# Patient Record
Sex: FEMALE | Race: WHITE | Employment: OTHER | URBAN - METROPOLITAN AREA
[De-identification: names, ages, dates, MRNs, and addresses within clinical notes are randomized per-mention and may not be internally consistent; named-entity substitution may affect disease eponyms.]

---

## 2017-01-10 ENCOUNTER — APPOINTMENT (OUTPATIENT)
Dept: PHYSICAL THERAPY | Age: 77
End: 2017-01-10
Attending: PSYCHIATRY & NEUROLOGY

## 2017-01-13 ENCOUNTER — APPOINTMENT (OUTPATIENT)
Dept: PHYSICAL THERAPY | Age: 77
End: 2017-01-13
Attending: PSYCHIATRY & NEUROLOGY

## 2017-01-17 ENCOUNTER — APPOINTMENT (OUTPATIENT)
Dept: PHYSICAL THERAPY | Age: 77
End: 2017-01-17
Attending: PSYCHIATRY & NEUROLOGY

## 2017-01-20 ENCOUNTER — APPOINTMENT (OUTPATIENT)
Dept: PHYSICAL THERAPY | Age: 77
End: 2017-01-20
Attending: PSYCHIATRY & NEUROLOGY

## 2017-01-24 ENCOUNTER — APPOINTMENT (OUTPATIENT)
Dept: PHYSICAL THERAPY | Age: 77
End: 2017-01-24
Attending: PSYCHIATRY & NEUROLOGY
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
## 2017-01-25 PROBLEM — R07.9 CHEST PAIN: Status: ACTIVE | Noted: 2017-01-25

## 2017-01-25 PROBLEM — R26.89 IMBALANCE: Status: ACTIVE | Noted: 2017-01-25

## 2017-01-27 ENCOUNTER — APPOINTMENT (OUTPATIENT)
Dept: PHYSICAL THERAPY | Age: 77
End: 2017-01-27
Attending: PSYCHIATRY & NEUROLOGY

## 2017-01-31 ENCOUNTER — APPOINTMENT (OUTPATIENT)
Dept: PHYSICAL THERAPY | Age: 77
End: 2017-01-31
Attending: PSYCHIATRY & NEUROLOGY

## 2017-02-02 ENCOUNTER — APPOINTMENT (OUTPATIENT)
Dept: PHYSICAL THERAPY | Age: 77
End: 2017-02-02
Attending: FAMILY MEDICINE

## 2017-02-16 PROBLEM — R07.9 CHEST PAIN: Status: RESOLVED | Noted: 2017-01-25 | Resolved: 2017-02-16

## 2017-02-16 PROBLEM — R19.7 DIARRHEA: Status: ACTIVE | Noted: 2017-02-16

## 2017-07-12 PROBLEM — G30.1 LATE ONSET ALZHEIMER'S DISEASE WITHOUT BEHAVIORAL DISTURBANCE (HCC): Status: ACTIVE | Noted: 2017-07-12

## 2017-07-12 PROBLEM — F02.80 LATE ONSET ALZHEIMER'S DISEASE WITHOUT BEHAVIORAL DISTURBANCE (HCC): Status: ACTIVE | Noted: 2017-07-12

## 2017-07-17 PROBLEM — R11.15 NON-INTRACTABLE CYCLICAL VOMITING WITH NAUSEA: Status: ACTIVE | Noted: 2017-07-17

## 2017-09-12 PROBLEM — G30.1 LATE ONSET ALZHEIMER'S DISEASE WITH BEHAVIORAL DISTURBANCE (HCC): Status: ACTIVE | Noted: 2017-09-12

## 2017-09-12 PROBLEM — G30.1 LATE ONSET ALZHEIMER'S DISEASE WITHOUT BEHAVIORAL DISTURBANCE (HCC): Status: ACTIVE | Noted: 2017-07-12

## 2017-09-12 PROBLEM — F02.80 LATE ONSET ALZHEIMER'S DISEASE WITHOUT BEHAVIORAL DISTURBANCE (HCC): Status: ACTIVE | Noted: 2017-07-12

## 2017-09-12 PROBLEM — F02.818 LATE ONSET ALZHEIMER'S DISEASE WITH BEHAVIORAL DISTURBANCE (HCC): Status: ACTIVE | Noted: 2017-09-12

## 2018-02-08 PROBLEM — N30.00 ACUTE CYSTITIS WITHOUT HEMATURIA: Status: ACTIVE | Noted: 2018-02-08

## 2018-03-13 PROBLEM — E11.21 TYPE 2 DIABETES WITH NEPHROPATHY (HCC): Status: ACTIVE | Noted: 2018-03-13

## 2018-03-27 PROBLEM — S30.0XXA CONTUSION, BUTTOCK, INITIAL ENCOUNTER: Status: ACTIVE | Noted: 2018-03-27

## 2018-10-17 ENCOUNTER — HOME HEALTH ADMISSION (OUTPATIENT)
Dept: HOME HEALTH SERVICES | Facility: HOME HEALTH | Age: 78
End: 2018-10-17

## 2019-03-22 ENCOUNTER — HOSPITAL ENCOUNTER (EMERGENCY)
Age: 79
Discharge: HOME | End: 2019-03-22
Attending: EMERGENCY MEDICINE
Payer: MEDICARE

## 2019-03-22 VITALS
TEMPERATURE: 98.1 F | RESPIRATION RATE: 20 BRPM | SYSTOLIC BLOOD PRESSURE: 161 MMHG | HEART RATE: 82 BPM | BODY MASS INDEX: 24.27 KG/M2 | WEIGHT: 137 LBS | DIASTOLIC BLOOD PRESSURE: 60 MMHG | OXYGEN SATURATION: 97 % | HEIGHT: 63 IN

## 2019-03-22 DIAGNOSIS — R07.9 CHEST PAIN, UNSPECIFIED TYPE: Primary | ICD-10-CM

## 2019-03-22 LAB
ALBUMIN SERPL-MCNC: 4.1 G/DL (ref 3.2–4.6)
ALBUMIN/GLOB SERPL: 1.2 {RATIO}
ALP SERPL-CCNC: 97 U/L (ref 50–130)
ALT SERPL-CCNC: 16 U/L (ref 12–65)
ANION GAP SERPL CALC-SCNC: 8 MMOL/L
AST SERPL-CCNC: 13 U/L (ref 15–37)
ATRIAL RATE: 89 BPM
BASOPHILS # BLD: 0 K/UL (ref 0–0.2)
BASOPHILS NFR BLD: 1 % (ref 0–2)
BILIRUB SERPL-MCNC: 0.3 MG/DL (ref 0.2–1.1)
BUN SERPL-MCNC: 18 MG/DL (ref 8–23)
CALCIUM SERPL-MCNC: 9.3 MG/DL (ref 8.3–10.4)
CALCULATED P AXIS, ECG09: 75 DEGREES
CALCULATED R AXIS, ECG10: -32 DEGREES
CALCULATED T AXIS, ECG11: 71 DEGREES
CHLORIDE SERPL-SCNC: 104 MMOL/L (ref 98–107)
CO2 SERPL-SCNC: 27 MMOL/L (ref 21–32)
CREAT SERPL-MCNC: 1.08 MG/DL (ref 0.6–1)
DIAGNOSIS, 93000: NORMAL
DIFFERENTIAL METHOD BLD: ABNORMAL
EOSINOPHIL # BLD: 0.2 K/UL (ref 0–0.8)
EOSINOPHIL NFR BLD: 3 % (ref 0.5–7.8)
ERYTHROCYTE [DISTWIDTH] IN BLOOD BY AUTOMATED COUNT: 12.9 % (ref 11.9–14.6)
GLOBULIN SER CALC-MCNC: 3.3 G/DL (ref 2.3–3.5)
GLUCOSE SERPL-MCNC: 250 MG/DL (ref 65–100)
HCT VFR BLD AUTO: 39.2 % (ref 35.8–46.3)
HGB BLD-MCNC: 12.7 G/DL (ref 11.7–15.4)
IMM GRANULOCYTES # BLD AUTO: 0 K/UL (ref 0–0.5)
IMM GRANULOCYTES NFR BLD AUTO: 0 % (ref 0–5)
LYMPHOCYTES # BLD: 1.6 K/UL (ref 0.5–4.6)
LYMPHOCYTES NFR BLD: 25 % (ref 13–44)
MCH RBC QN AUTO: 29.9 PG (ref 26.1–32.9)
MCHC RBC AUTO-ENTMCNC: 32.4 G/DL (ref 31.4–35)
MCV RBC AUTO: 92.2 FL (ref 79.6–97.8)
MONOCYTES # BLD: 0.9 K/UL (ref 0.1–1.3)
MONOCYTES NFR BLD: 14 % (ref 4–12)
NEUTS SEG # BLD: 3.6 K/UL (ref 1.7–8.2)
NEUTS SEG NFR BLD: 58 % (ref 43–78)
NRBC # BLD: 0 K/UL (ref 0–0.2)
P-R INTERVAL, ECG05: 182 MS
PLATELET # BLD AUTO: 264 K/UL (ref 150–450)
PMV BLD AUTO: 10.6 FL (ref 9.4–12.3)
POTASSIUM SERPL-SCNC: 4.2 MMOL/L (ref 3.5–5.1)
PROT SERPL-MCNC: 7.4 G/DL
Q-T INTERVAL, ECG07: 382 MS
QRS DURATION, ECG06: 80 MS
QTC CALCULATION (BEZET), ECG08: 464 MS
RBC # BLD AUTO: 4.25 M/UL (ref 4.05–5.2)
SODIUM SERPL-SCNC: 139 MMOL/L (ref 136–145)
TROPONIN I BLD-MCNC: 0 NG/ML (ref 0.02–0.05)
TROPONIN I BLD-MCNC: 0 NG/ML (ref 0.02–0.05)
VENTRICULAR RATE, ECG03: 89 BPM
WBC # BLD AUTO: 6.3 K/UL (ref 4.3–11.1)

## 2019-03-22 PROCEDURE — 85025 COMPLETE CBC W/AUTO DIFF WBC: CPT

## 2019-03-22 PROCEDURE — 84484 ASSAY OF TROPONIN QUANT: CPT

## 2019-03-22 PROCEDURE — 80053 COMPREHEN METABOLIC PANEL: CPT

## 2019-03-22 PROCEDURE — 99285 EMERGENCY DEPT VISIT HI MDM: CPT | Performed by: EMERGENCY MEDICINE

## 2019-03-22 PROCEDURE — 93005 ELECTROCARDIOGRAM TRACING: CPT | Performed by: EMERGENCY MEDICINE

## 2019-03-22 NOTE — ED TRIAGE NOTES
Pt reports cp starting last evening. Described as pressure in middle of chest. Denies sob, nausea or vomiting. Cardiac hx from 2009

## 2019-03-23 NOTE — ED PROVIDER NOTES
History is obtained mainly from the patient's daughter as the patient has dementia. She was complaining of some chest pain off and on which started early this morning. The patient is unable to describe it in any great detail. She does have a cardiac history including bypass surgery approximately ten years ago with a stent placed approximately a year later. She sees  [facility] cardiology. The daughter states that the patient does not typically have chest pain. The patient does not remember having chest pain. The patient is unable to provide any more detail. Elements of this note were created using speech recognition software. As such, errors of speech recognition may be present. Past Medical History:  
Diagnosis Date  Anxiety state, unspecified  CAD (coronary artery disease)  Diabetes ([address] 8/27/2009  GERD (gastroesophageal reflux disease)  History of snoring 7/13/2016  History of stroke 10/19/2016  Hypertension  Hypothyroid 8/27/2009  Late onset Alzheimer's disease without behavioral disturbance 07/12/2012  Mild dementia 8/30/2016  Mixed hyperlipidemia  Osteopenia  Reactive depression 5/16/2016  Retinopathy due to secondary diabetes ([address] 8/27/2009  Sensory polyneuropathy 8/30/2016 Past Surgical History:  
Procedure Laterality Date  HX ANGIOPLASTY  2009 STENTING-post CABG  
 HX CHOLECYSTECTOMY  1993 LAPAROSCOPIC  
 HX CORONARY ARTERY BYPASS GRAFT  2009  
 triple [id number] thereafter  HX HEENT    
 right eyex  2-laser surgery?  HX KNEE ARTHROSCOPY Right  HX OTHER SURGICAL  2008  
 cataract extraction and iol implant  HX OTHER SURGICAL Carotid stent 3  
 HX THYROIDECTOMY  HX TONSILLECTOMY  as child  HX TOTAL VAGINAL HYSTERECTOMY    
 hysterectomy  HX UROLOGICAL    
 bladder tack Family History:  
Problem Relation Age of Onset  Heart Disease Mother CHF  Lung Disease Mother  COPD Mother  Heart Disease Father CAD  Cancer Father LUNG  
 Heart Disease Maternal Grandfather  Diabetes Maternal Grandfather  Heart Disease Paternal Grandfather  Breast Cancer Child 48  Hypertension Sister  Other Sister TACHYCARDIA  Diabetes Maternal Grandmother  Arthritis-osteo Sister  Malignant Hyperthermia Neg Hx  Pseudocholinesterase Deficiency Neg Hx  Delayed Awakening Neg Hx  Post-op Nausea/Vomiting Neg Hx  Emergence Delirium Neg Hx  Post-op Cognitive Dysfunction Neg Hx Social History Socioeconomic History  Marital status:  Spouse name: Not on file  Number of children: Not on file  Years of education: Not on file  Highest education level: Not on file Occupational History  Not on file Social Needs  Financial resource strain: Not on file  Food insecurity:  
  Worry: Not on file Inability: Not on file  Transportation needs:  
  Medical: Not on file Non-medical: Not on file Tobacco Use  Smoking status: Never Smoker  Smokeless tobacco: Never Used Substance and Sexual Activity  Alcohol use: No  
 Drug use: No  
 Sexual activity: Not Currently Partners: Male Lifestyle  Physical activity:  
  Days per week: Not on file Minutes per session: Not on file  Stress: Not on file Relationships  Social connections:  
  Talks on phone: Not on file Gets together: Not on file Attends [employer]ology service: Not on file Active member of club or organization: Not on file Attends meetings of clubs or organizations: Not on file Relationship status: Not on file  Intimate partner violence:  
  Fear of current or ex partner: Not on file Emotionally abused: Not on file Physically abused: Not on file Forced sexual activity: Not on file Other Topics Concern  Not on file Social History Narrative  Not on file ALLERGIES: Ace inhibitors; Aricept [donepezil]; Dextrose; Gatifloxacin; Levaquin [levofloxacin]; Macrodantin [nitrofurantoin macrocrystalline]; Morphine; Namenda [memantine]; Carmencita; and Sulfa (sulfonamide antibiotics) Review of Systems Unable to perform ROS: Dementia Cardiovascular: Positive for chest pain. Vitals:  
 03/22/19 1658 03/22/19 1859 BP: 192/83 140/63 Pulse: 97 92 Resp: 17 26 Temp: 98.1 °F (36.7 °C) SpO2: 99% 97% Weight: 62.1 kg (137 lb) Height: 5' 3\" (1.6 m) Physical Exam  
Constitutional: She appears well-developed and well-nourished. HENT:  
Head: Normocephalic and atraumatic. Eyes: Pupils are equal, round, and reactive to light. Conjunctivae are normal.  
Neck: Normal range of motion. Neck supple. Cardiovascular: Normal rate and regular rhythm. Pulmonary/Chest: Effort normal and breath sounds normal.  
Abdominal: Soft. There is no tenderness. Musculoskeletal: She exhibits no edema or tenderness. Neurological: She is alert. Skin: Skin is warm and dry. Psychiatric: She has a normal mood and affect. Her behavior is normal.  
Nursing note and vitals reviewed. MDM Number of Diagnoses or Management Options Diagnosis management comments: 8:44 PM discussed results with patient and family. Her EKG and troponin first set are normal.  The plan is to get a second set and discharge her home if negative. Her contact information will be left with [facility] for close outpatient follow-up. Amount and/or Complexity of Data Reviewed Clinical lab tests: reviewed and ordered Tests in the radiology section of CPT®: ordered and reviewed Tests in the medicine section of CPT®: reviewed and ordered Obtain history from someone other than the patient: yes Risk of Complications, Morbidity, and/or Mortality Presenting problems: moderate Diagnostic procedures: moderate Management options: moderate Patient Progress Patient progress: stable Procedures

## 2019-03-23 NOTE — ED NOTES
I have reviewed discharge instructions with the patient. The patient verbalized understanding. Patient left ED via Discharge Method: ambulatory to Home with daughter. Opportunity for questions and clarification provided. Patient given 0 scripts. To continue your aftercare when you leave the hospital, you may receive an automated call from our care team to check in on how you are doing. This is a free service and part of our promise to provide the best care and service to meet your aftercare needs.  If you have questions, or wish to unsubscribe from this service please call [phone]. Thank you for Choosing our [facility] Emergency Department.

## 2019-03-23 NOTE — DISCHARGE INSTRUCTIONS
Follow-up with [facility], the office will call you Monday morning with an appointment time. Return to the emergency department if your symptoms worsen before then.

## 2019-05-13 ENCOUNTER — PATIENT OUTREACH (OUTPATIENT)
Dept: CASE MANAGEMENT | Age: 79
End: 2019-05-13

## 2019-05-15 ENCOUNTER — PATIENT OUTREACH (OUTPATIENT)
Dept: CASE MANAGEMENT | Age: 79
End: 2019-05-15

## 2019-05-17 ENCOUNTER — PATIENT OUTREACH (OUTPATIENT)
Dept: CASE MANAGEMENT | Age: 79
End: 2019-05-17

## 2019-10-01 ENCOUNTER — HOSPITAL ENCOUNTER (EMERGENCY)
Age: 79
Discharge: HOME | End: 2019-10-01
Attending: EMERGENCY MEDICINE
Payer: MEDICARE

## 2019-10-01 ENCOUNTER — APPOINTMENT (OUTPATIENT)
Dept: GENERAL RADIOLOGY | Age: 79
End: 2019-10-01
Attending: EMERGENCY MEDICINE
Payer: MEDICARE

## 2019-10-01 VITALS
RESPIRATION RATE: 18 BRPM | WEIGHT: 145 LBS | BODY MASS INDEX: 25.69 KG/M2 | TEMPERATURE: 98.1 F | HEIGHT: 63 IN | DIASTOLIC BLOOD PRESSURE: 65 MMHG | SYSTOLIC BLOOD PRESSURE: 124 MMHG | OXYGEN SATURATION: 96 % | HEART RATE: 81 BPM

## 2019-10-01 DIAGNOSIS — N39.0 URINARY TRACT INFECTION WITHOUT HEMATURIA, SITE UNSPECIFIED: Primary | ICD-10-CM

## 2019-10-01 DIAGNOSIS — G30.9 ALZHEIMER'S DEMENTIA WITHOUT BEHAVIORAL DISTURBANCE, UNSPECIFIED TIMING OF DEMENTIA ONSET: ICD-10-CM

## 2019-10-01 DIAGNOSIS — F02.80 ALZHEIMER'S DEMENTIA WITHOUT BEHAVIORAL DISTURBANCE, UNSPECIFIED TIMING OF DEMENTIA ONSET: ICD-10-CM

## 2019-10-01 LAB
ALBUMIN SERPL-MCNC: 4 G/DL (ref 3.2–4.6)
ALBUMIN/GLOB SERPL: 1.2 {RATIO} (ref 1.2–3.5)
ALP SERPL-CCNC: 63 U/L (ref 50–130)
ALT SERPL-CCNC: 11 U/L (ref 12–65)
ANION GAP SERPL CALC-SCNC: 8 MMOL/L (ref 7–16)
APPEARANCE UR: ABNORMAL
AST SERPL-CCNC: 10 U/L (ref 15–37)
ATRIAL RATE: 76 BPM
BACTERIA URNS QL MICRO: 0 /HPF
BASOPHILS # BLD: 0 K/UL (ref 0–0.2)
BASOPHILS NFR BLD: 0 % (ref 0–2)
BILIRUB SERPL-MCNC: 0.6 MG/DL (ref 0.2–1.1)
BILIRUB UR QL: NEGATIVE
BUN SERPL-MCNC: 26 MG/DL (ref 8–23)
CALCIUM SERPL-MCNC: 9.5 MG/DL (ref 8.3–10.4)
CALCULATED P AXIS, ECG09: 76 DEGREES
CALCULATED R AXIS, ECG10: -19 DEGREES
CALCULATED T AXIS, ECG11: 69 DEGREES
CHLORIDE SERPL-SCNC: 104 MMOL/L (ref 98–107)
CO2 SERPL-SCNC: 27 MMOL/L (ref 21–32)
COLOR UR: YELLOW
CREAT SERPL-MCNC: 1.2 MG/DL (ref 0.6–1)
DIAGNOSIS, 93000: NORMAL
DIFFERENTIAL METHOD BLD: ABNORMAL
EOSINOPHIL # BLD: 0.1 K/UL (ref 0–0.8)
EOSINOPHIL NFR BLD: 1 % (ref 0.5–7.8)
EPI CELLS #/AREA URNS HPF: ABNORMAL /HPF
ERYTHROCYTE [DISTWIDTH] IN BLOOD BY AUTOMATED COUNT: 12.4 % (ref 11.9–14.6)
GLOBULIN SER CALC-MCNC: 3.3 G/DL (ref 2.3–3.5)
GLUCOSE SERPL-MCNC: 105 MG/DL (ref 65–100)
GLUCOSE UR STRIP.AUTO-MCNC: NEGATIVE MG/DL
HCT VFR BLD AUTO: 36.1 % (ref 35.8–46.3)
HGB BLD-MCNC: 11.5 G/DL (ref 11.7–15.4)
HGB UR QL STRIP: ABNORMAL
IMM GRANULOCYTES # BLD AUTO: 0 K/UL (ref 0–0.5)
IMM GRANULOCYTES NFR BLD AUTO: 0 % (ref 0–5)
KETONES UR QL STRIP.AUTO: NEGATIVE MG/DL
LACTATE BLD-SCNC: 1.75 MMOL/L (ref 0.5–1.9)
LEUKOCYTE ESTERASE UR QL STRIP.AUTO: ABNORMAL
LYMPHOCYTES # BLD: 1 K/UL (ref 0.5–4.6)
LYMPHOCYTES NFR BLD: 9 % (ref 13–44)
MCH RBC QN AUTO: 29.4 PG (ref 26.1–32.9)
MCHC RBC AUTO-ENTMCNC: 31.9 G/DL (ref 31.4–35)
MCV RBC AUTO: 92.3 FL (ref 79.6–97.8)
MONOCYTES # BLD: 1.5 K/UL (ref 0.1–1.3)
MONOCYTES NFR BLD: 14 % (ref 4–12)
NEUTS SEG # BLD: 8.2 K/UL (ref 1.7–8.2)
NEUTS SEG NFR BLD: 76 % (ref 43–78)
NITRITE UR QL STRIP.AUTO: NEGATIVE
NRBC # BLD: 0 K/UL (ref 0–0.2)
P-R INTERVAL, ECG05: 184 MS
PH UR STRIP: 5 [PH] (ref 5–9)
PLATELET # BLD AUTO: 211 K/UL (ref 150–450)
PMV BLD AUTO: 10.1 FL (ref 9.4–12.3)
POTASSIUM SERPL-SCNC: 4.4 MMOL/L (ref 3.5–5.1)
PROT SERPL-MCNC: 7.3 G/DL (ref 6.3–8.2)
PROT UR STRIP-MCNC: NEGATIVE MG/DL
Q-T INTERVAL, ECG07: 374 MS
QRS DURATION, ECG06: 76 MS
QTC CALCULATION (BEZET), ECG08: 420 MS
RBC # BLD AUTO: 3.91 M/UL (ref 4.05–5.2)
RBC #/AREA URNS HPF: ABNORMAL /HPF
SODIUM SERPL-SCNC: 139 MMOL/L (ref 136–145)
SP GR UR REFRACTOMETRY: 1.02 (ref 1–1.02)
TROPONIN I BLD-MCNC: 0 NG/ML (ref 0.02–0.05)
UROBILINOGEN UR QL STRIP.AUTO: 0.2 EU/DL (ref 0.2–1)
VENTRICULAR RATE, ECG03: 76 BPM
WBC # BLD AUTO: 10.8 K/UL (ref 4.3–11.1)
WBC URNS QL MICRO: >100 /HPF

## 2019-10-01 PROCEDURE — 80053 COMPREHEN METABOLIC PANEL: CPT

## 2019-10-01 PROCEDURE — 96374 THER/PROPH/DIAG INJ IV PUSH: CPT | Performed by: EMERGENCY MEDICINE

## 2019-10-01 PROCEDURE — 74011250636 HC RX REV CODE- 250/636: Performed by: EMERGENCY MEDICINE

## 2019-10-01 PROCEDURE — 93005 ELECTROCARDIOGRAM TRACING: CPT | Performed by: EMERGENCY MEDICINE

## 2019-10-01 PROCEDURE — 81001 URINALYSIS AUTO W/SCOPE: CPT

## 2019-10-01 PROCEDURE — 85025 COMPLETE CBC W/AUTO DIFF WBC: CPT

## 2019-10-01 PROCEDURE — 99285 EMERGENCY DEPT VISIT HI MDM: CPT | Performed by: EMERGENCY MEDICINE

## 2019-10-01 PROCEDURE — 84484 ASSAY OF TROPONIN QUANT: CPT

## 2019-10-01 PROCEDURE — 96365 THER/PROPH/DIAG IV INF INIT: CPT | Performed by: EMERGENCY MEDICINE

## 2019-10-01 PROCEDURE — 83605 ASSAY OF LACTIC ACID: CPT

## 2019-10-01 PROCEDURE — 74011000258 HC RX REV CODE- 258: Performed by: EMERGENCY MEDICINE

## 2019-10-01 PROCEDURE — 71045 X-RAY EXAM CHEST 1 VIEW: CPT

## 2019-10-01 RX ORDER — CEPHALEXIN 500 MG/1
500 CAPSULE ORAL 4 TIMES DAILY
Qty: 40 CAP | Refills: 0 | Status: SHIPPED | OUTPATIENT
Start: 2019-10-01 | End: 2019-10-11

## 2019-10-01 RX ORDER — ONDANSETRON 2 MG/ML
4 INJECTION INTRAMUSCULAR; INTRAVENOUS
Status: COMPLETED | OUTPATIENT
Start: 2019-10-01 | End: 2019-10-01

## 2019-10-01 RX ADMIN — SODIUM CHLORIDE 1000 ML: 900 INJECTION, SOLUTION INTRAVENOUS at 17:59

## 2019-10-01 RX ADMIN — ONDANSETRON 4 MG: 2 INJECTION INTRAMUSCULAR; INTRAVENOUS at 18:00

## 2019-10-01 RX ADMIN — CEFTRIAXONE 1 G: 1 INJECTION, POWDER, FOR SOLUTION INTRAMUSCULAR; INTRAVENOUS at 18:47

## 2019-10-01 NOTE — ED PROVIDER NOTES
78-year-old female with a history of dementia presents with complaints of chest pain generalized weakness and burning with urination Patient is not able to give a very precise history but states that she got up to walk and felt weak which also triggered some nausea and some discomfort in her chest. 
Symptoms were vague. She did not lose consciousness History of UTI The history is provided by the patient, a relative and the nursing home. Chest Pain Associated symptoms include nausea. Pertinent negatives include no abdominal pain, no back pain, no cough, no fever, no headaches, no palpitations, no shortness of breath and no vomiting. Nausea Pertinent negatives include no chills, no fever, no abdominal pain, no diarrhea, no headaches, no arthralgias, no myalgias, no cough and no headaches. The patient is not pregnant. Urinary Pain This is a recurrent problem. The current episode started 6 to 12 hours ago. The problem occurs every urination. The problem has been gradually worsening. The quality of the pain is described as burning. The pain is mild. There has been no fever. She is not sexually active. Associated symptoms include nausea and frequency. Pertinent negatives include no chills, no vomiting, no hematuria, no flank pain, no abdominal pain and no back pain. The patient is not pregnant. She has tried nothing for the symptoms. Past Medical History:  
Diagnosis Date  Alzheimer's dementia ([address]  Anxiety state, unspecified  CAD (coronary artery disease)  Diabetes ([address] 8/27/2009  GERD (gastroesophageal reflux disease)  History of snoring 7/13/2016  History of stroke 10/19/2016  Hypertension  Hypothyroid 8/27/2009  Late onset Alzheimer's disease without behavioral disturbance ([address] 07/12/2012  Mild dementia ([address] 8/30/2016  Mixed hyperlipidemia  Osteopenia  Reactive depression 5/16/2016  Retinopathy due to secondary diabetes ([address] 8/27/2009  Sensory polyneuropathy 8/30/2016 Past Surgical History:  
Procedure Laterality Date  HX ANGIOPLASTY  2009 STENTING-post CABG  
 HX CHOLECYSTECTOMY  1993 LAPAROSCOPIC  
 HX CORONARY ARTERY BYPASS GRAFT  2009  
 triple [id number] thereafter  HX HEENT    
 right eyex  2-laser surgery?  HX KNEE ARTHROSCOPY Right  HX OTHER SURGICAL  2008  
 cataract extraction and iol implant  HX OTHER SURGICAL Carotid stent 3  
 HX THYROIDECTOMY  HX TONSILLECTOMY  as child  HX TOTAL VAGINAL HYSTERECTOMY    
 hysterectomy  HX UROLOGICAL    
 bladder tack Family History:  
Problem Relation Age of Onset  Heart Disease Mother CHF  Lung Disease Mother  COPD Mother  Heart Disease Father CAD  Cancer Father LUNG  
 Heart Disease Maternal Grandfather  Diabetes Maternal Grandfather  Heart Disease Paternal Grandfather  Breast Cancer Child 48  Hypertension Sister  Other Sister TACHYCARDIA  Diabetes Maternal Grandmother  Arthritis-osteo Sister  Malignant Hyperthermia Neg Hx  Pseudocholinesterase Deficiency Neg Hx  Delayed Awakening Neg Hx  Post-op Nausea/Vomiting Neg Hx  Emergence Delirium Neg Hx  Post-op Cognitive Dysfunction Neg Hx Social History Socioeconomic History  Marital status:  Spouse name: Not on file  Number of children: Not on file  Years of education: Not on file  Highest education level: Not on file Occupational History  Not on file Social Needs  Financial resource strain: Not on file  Food insecurity:  
  Worry: Not on file Inability: Not on file  Transportation needs:  
  Medical: Not on file Non-medical: Not on file Tobacco Use  Smoking status: Never Smoker  Smokeless tobacco: Never Used Substance and Sexual Activity  Alcohol use: No  
 Drug use: No  
 Sexual activity: Not Currently Partners: Male Lifestyle  Physical activity:  
  Days per week: Not on file Minutes per session: Not on file  Stress: Not on file Relationships  Social connections:  
  Talks on phone: Not on file Gets together: Not on file Attends Evangelical service: Not on file Active member of club or organization: Not on file Attends meetings of clubs or organizations: Not on file Relationship status: Not on file  Intimate partner violence:  
  Fear of current or ex partner: Not on file Emotionally abused: Not on file Physically abused: Not on file Forced sexual activity: Not on file Other Topics Concern  Not on file Social History Narrative  Not on file ALLERGIES: Ace inhibitors; Aricept [donepezil]; Dextrose; Gatifloxacin; Levaquin [levofloxacin]; Macrodantin [nitrofurantoin macrocrystalline]; Morphine; Namenda [memantine]; Carmencita; and Sulfa (sulfonamide antibiotics) Review of Systems Unable to perform ROS: Dementia Constitutional: Negative for chills and fever. HENT: Negative for congestion, ear pain and rhinorrhea. Eyes: Negative for photophobia and discharge. Respiratory: Negative for cough and shortness of breath. Cardiovascular: Positive for chest pain. Negative for palpitations. Gastrointestinal: Positive for nausea. Negative for abdominal pain, constipation, diarrhea and vomiting. Endocrine: Negative for cold intolerance and heat intolerance. Genitourinary: Positive for frequency. Negative for dysuria, flank pain and hematuria. Musculoskeletal: Negative for arthralgias, back pain, myalgias and neck pain. Skin: Negative for rash and wound. Allergic/Immunologic: Negative for environmental allergies and food allergies. Neurological: Negative for syncope and headaches. Hematological: Negative for adenopathy. Does not bruise/bleed easily. Psychiatric/Behavioral: Negative for dysphoric mood.  The patient is not nervous/anxious. All other systems reviewed and are negative. Vitals:  
 10/01/19 1634 BP: (!) 170/112 Pulse: 80 Resp: 16 Temp: 98.3 °F (36.8 °C) SpO2: 96% Weight: 65.8 kg (145 lb) Height: 5' 3\" (1.6 m) Physical Exam  
Constitutional: She appears well-developed and well-nourished. She appears distressed. HENT:  
Head: Normocephalic and atraumatic. Right Ear: External ear normal.  
Left Ear: External ear normal.  
Mouth/Throat: Oropharynx is clear and moist. No oropharyngeal exudate. Eyes: Pupils are equal, round, and reactive to light. Conjunctivae and EOM are normal.  
Neck: Normal range of motion. Neck supple. No JVD present. Cardiovascular: Normal rate, regular rhythm, normal heart sounds and intact distal pulses. Exam reveals no gallop and no friction rub. No murmur heard. Pulmonary/Chest: Effort normal and breath sounds normal. No tachypnea. She has no decreased breath sounds. She has no wheezes. Abdominal: Soft. Normal appearance and bowel sounds are normal. She exhibits no distension and no mass. There is no hepatosplenomegaly. There is no tenderness. Musculoskeletal: Normal range of motion. She exhibits no edema or deformity. Right lower leg: She exhibits no edema. Left lower leg: She exhibits no edema. Neurological: She is alert. She has normal strength. She is disoriented. No cranial nerve deficit or sensory deficit. She displays a negative Romberg sign. Gait normal. GCS eye subscore is 4. GCS verbal subscore is 5. GCS motor subscore is 6. Skin: Skin is warm and dry. Capillary refill takes less than 2 seconds. No rash noted. Psychiatric: She has a normal mood and affect. Her speech is normal and behavior is normal. Judgment and thought content normal. Cognition and memory are impaired. Nursing note and vitals reviewed. MDM Number of Diagnoses or Management Options Alzheimer's dementia without behavioral disturbance, unspecified timing of dementia onset ([facility] [address]): established and worsening Urinary tract infection without hematuria, site unspecified: new and requires workup Diagnosis management comments: EKG reviewed Sinus rhythm Normal intervals normal axis no ectopy No acute ischemic changes Work-up today reveals UTI. White count lactic acid and kidney function are all normal 
 
She will be treated with Rocephin here in the ER and then started on Keflex as an outpatient Family is counseled for follow-up and monitoring for signs of deterioration. Amount and/or Complexity of Data Reviewed Clinical lab tests: ordered and reviewed Tests in the radiology section of CPT®: reviewed and ordered Tests in the medicine section of CPT®: ordered and reviewed Review and summarize past medical records: yes Risk of Complications, Morbidity, and/or Mortality Presenting problems: moderate Diagnostic procedures: moderate Management options: moderate General comments: Elements of this note have been dictated via voice recognition software. Text and phrases may be limited by the accuracy of the software. The chart has been reviewed, but errors may still be present. Patient Progress Patient progress: improved Procedures

## 2019-10-01 NOTE — ED NOTES
Kassidy Chacon was called for third attempt. This time I was able to speak with a staff member. She wrote down our fax number and states that she will send the medication list to us now

## 2019-10-01 NOTE — ED TRIAGE NOTES
Pt to ED with daughter from [employer]. Pt c/o nausea, low grade fever, hypoxia, chest pain and urinary pain. Pt with hx of UTI. Pt denies cough/congestion. Pt with hx of alzheimer's disease. Slight confusion.

## 2019-10-02 NOTE — ED NOTES
I have reviewed discharge instructions with the patient. The patient verbalized understanding. Patient left ED via Discharge Method: wheelchair to Home with daughter. Opportunity for questions and clarification provided. Patient given 1 scripts. To continue your aftercare when you leave the hospital, you may receive an automated call from our care team to check in on how you are doing. This is a free service and part of our promise to provide the best care and service to meet your aftercare needs.  If you have questions, or wish to unsubscribe from this service please call [phone]. Thank you for Choosing our [address] [place] [address] Emergency Department.

## 2019-10-02 NOTE — DISCHARGE INSTRUCTIONS
Complete all antibiotics  Follow-up with primary care  Drink plenty of fluids  Monitor for fever vomiting or other new symptoms  Return to ER for any worsening symptoms or new problems which may arise

## 2019-11-25 ENCOUNTER — ANESTHESIA EVENT (OUTPATIENT)
Dept: SURGERY | Age: 79
DRG: 470 | End: 2019-11-25
Payer: MEDICARE

## 2019-11-25 ENCOUNTER — APPOINTMENT (OUTPATIENT)
Dept: GENERAL RADIOLOGY | Age: 79
DRG: 470 | End: 2019-11-25
Attending: EMERGENCY MEDICINE
Payer: MEDICARE

## 2019-11-25 ENCOUNTER — HOSPITAL ENCOUNTER (INPATIENT)
Age: 79
LOS: 2 days | Discharge: NURSING, REHAB OR LONG-TERM CARE | DRG: 470 | End: 2019-11-27
Attending: EMERGENCY MEDICINE | Admitting: INTERNAL MEDICINE
Payer: MEDICARE

## 2019-11-25 ENCOUNTER — APPOINTMENT (OUTPATIENT)
Dept: GENERAL RADIOLOGY | Age: 79
DRG: 470 | End: 2019-11-25
Attending: ORTHOPAEDIC SURGERY
Payer: MEDICARE

## 2019-11-25 ENCOUNTER — ANESTHESIA (OUTPATIENT)
Dept: SURGERY | Age: 79
DRG: 470 | End: 2019-11-25
Payer: MEDICARE

## 2019-11-25 DIAGNOSIS — S72.001A CLOSED RIGHT HIP FRACTURE, INITIAL ENCOUNTER (HCC): Primary | ICD-10-CM

## 2019-11-25 PROBLEM — E11.21 TYPE 2 DIABETES WITH NEPHROPATHY (HCC): Chronic | Status: ACTIVE | Noted: 2018-03-13

## 2019-11-25 PROBLEM — G30.1 LATE ONSET ALZHEIMER'S DISEASE WITHOUT BEHAVIORAL DISTURBANCE (HCC): Chronic | Status: ACTIVE | Noted: 2017-07-12

## 2019-11-25 PROBLEM — F02.80 LATE ONSET ALZHEIMER'S DISEASE WITHOUT BEHAVIORAL DISTURBANCE (HCC): Chronic | Status: ACTIVE | Noted: 2017-07-12

## 2019-11-25 PROBLEM — S72.009A HIP FRACTURE REQUIRING OPERATIVE REPAIR (HCC): Status: ACTIVE | Noted: 2019-11-25

## 2019-11-25 LAB
ABO + RH BLD: NORMAL
ALBUMIN SERPL-MCNC: 3.5 G/DL (ref 3.2–4.6)
ALBUMIN/GLOB SERPL: 1.2 {RATIO} (ref 1.2–3.5)
ALP SERPL-CCNC: 84 U/L (ref 50–136)
ALT SERPL-CCNC: 21 U/L (ref 12–65)
ANION GAP SERPL CALC-SCNC: 7 MMOL/L (ref 7–16)
APPEARANCE UR: CLEAR
AST SERPL-CCNC: 11 U/L (ref 15–37)
ATRIAL RATE: 76 BPM
BACTERIA SPEC CULT: NORMAL
BACTERIA URNS QL MICRO: 0 /HPF
BASOPHILS # BLD: 0 K/UL (ref 0–0.2)
BASOPHILS NFR BLD: 0 % (ref 0–2)
BILIRUB SERPL-MCNC: 0.6 MG/DL (ref 0.2–1.1)
BILIRUB UR QL: NEGATIVE
BLOOD GROUP ANTIBODIES SERPL: NORMAL
BUN SERPL-MCNC: 23 MG/DL (ref 8–23)
CALCIUM SERPL-MCNC: 9.3 MG/DL (ref 8.3–10.4)
CALCULATED P AXIS, ECG09: 76 DEGREES
CALCULATED R AXIS, ECG10: -32 DEGREES
CALCULATED T AXIS, ECG11: 57 DEGREES
CASTS URNS QL MICRO: ABNORMAL /LPF
CHLORIDE SERPL-SCNC: 108 MMOL/L (ref 98–107)
CO2 SERPL-SCNC: 26 MMOL/L (ref 21–32)
COLOR UR: YELLOW
CREAT SERPL-MCNC: 1.14 MG/DL (ref 0.6–1)
DIAGNOSIS, 93000: NORMAL
DIFFERENTIAL METHOD BLD: ABNORMAL
EOSINOPHIL # BLD: 0.1 K/UL (ref 0–0.8)
EOSINOPHIL NFR BLD: 1 % (ref 0.5–7.8)
EPI CELLS #/AREA URNS HPF: ABNORMAL /HPF
ERYTHROCYTE [DISTWIDTH] IN BLOOD BY AUTOMATED COUNT: 12.4 % (ref 11.9–14.6)
ERYTHROCYTE [DISTWIDTH] IN BLOOD BY AUTOMATED COUNT: 12.4 % (ref 11.9–14.6)
GLOBULIN SER CALC-MCNC: 2.9 G/DL (ref 2.3–3.5)
GLUCOSE BLD STRIP.AUTO-MCNC: 156 MG/DL (ref 65–100)
GLUCOSE BLD STRIP.AUTO-MCNC: 170 MG/DL (ref 65–100)
GLUCOSE BLD STRIP.AUTO-MCNC: 207 MG/DL (ref 65–100)
GLUCOSE BLD STRIP.AUTO-MCNC: 223 MG/DL (ref 65–100)
GLUCOSE SERPL-MCNC: 190 MG/DL (ref 65–100)
GLUCOSE UR STRIP.AUTO-MCNC: NEGATIVE MG/DL
HCT VFR BLD AUTO: 34.8 % (ref 35.8–46.3)
HCT VFR BLD AUTO: 40 % (ref 35.8–46.3)
HGB BLD-MCNC: 10.9 G/DL (ref 11.7–15.4)
HGB BLD-MCNC: 13.1 G/DL (ref 11.7–15.4)
HGB UR QL STRIP: ABNORMAL
IMM GRANULOCYTES # BLD AUTO: 0 K/UL (ref 0–0.5)
IMM GRANULOCYTES NFR BLD AUTO: 0 % (ref 0–5)
INR PPP: 1
KETONES UR QL STRIP.AUTO: NEGATIVE MG/DL
LEUKOCYTE ESTERASE UR QL STRIP.AUTO: NEGATIVE
LYMPHOCYTES # BLD: 1.1 K/UL (ref 0.5–4.6)
LYMPHOCYTES NFR BLD: 9 % (ref 13–44)
MCH RBC QN AUTO: 29.9 PG (ref 26.1–32.9)
MCH RBC QN AUTO: 30.3 PG (ref 26.1–32.9)
MCHC RBC AUTO-ENTMCNC: 31.3 G/DL (ref 31.4–35)
MCHC RBC AUTO-ENTMCNC: 32.8 G/DL (ref 31.4–35)
MCV RBC AUTO: 92.6 FL (ref 79.6–97.8)
MCV RBC AUTO: 95.3 FL (ref 79.6–97.8)
MONOCYTES # BLD: 1.4 K/UL (ref 0.1–1.3)
MONOCYTES NFR BLD: 11 % (ref 4–12)
NEUTS SEG # BLD: 9.9 K/UL (ref 1.7–8.2)
NEUTS SEG NFR BLD: 79 % (ref 43–78)
NITRITE UR QL STRIP.AUTO: NEGATIVE
NRBC # BLD: 0 K/UL (ref 0–0.2)
NRBC # BLD: 0 K/UL (ref 0–0.2)
P-R INTERVAL, ECG05: 160 MS
PH UR STRIP: 6.5 [PH] (ref 5–9)
PLATELET # BLD AUTO: 209 K/UL (ref 150–450)
PLATELET # BLD AUTO: 276 K/UL (ref 150–450)
PMV BLD AUTO: 10.9 FL (ref 9.4–12.3)
PMV BLD AUTO: 11 FL (ref 9.4–12.3)
POTASSIUM SERPL-SCNC: 4.3 MMOL/L (ref 3.5–5.1)
PROT SERPL-MCNC: 6.4 G/DL (ref 6.3–8.2)
PROT UR STRIP-MCNC: NEGATIVE MG/DL
PROTHROMBIN TIME: 13 SEC (ref 11.7–14.5)
Q-T INTERVAL, ECG07: 386 MS
QRS DURATION, ECG06: 82 MS
QTC CALCULATION (BEZET), ECG08: 434 MS
RBC # BLD AUTO: 3.65 M/UL (ref 4.05–5.2)
RBC # BLD AUTO: 4.32 M/UL (ref 4.05–5.2)
RBC #/AREA URNS HPF: ABNORMAL /HPF
SERVICE CMNT-IMP: NORMAL
SODIUM SERPL-SCNC: 141 MMOL/L (ref 136–145)
SP GR UR REFRACTOMETRY: 1.01 (ref 1–1.02)
SPECIMEN EXP DATE BLD: NORMAL
UROBILINOGEN UR QL STRIP.AUTO: 0.2 EU/DL (ref 0.2–1)
VENTRICULAR RATE, ECG03: 76 BPM
WBC # BLD AUTO: 10.1 K/UL (ref 4.3–11.1)
WBC # BLD AUTO: 12.5 K/UL (ref 4.3–11.1)
WBC URNS QL MICRO: ABNORMAL /HPF

## 2019-11-25 PROCEDURE — 86580 TB INTRADERMAL TEST: CPT | Performed by: INTERNAL MEDICINE

## 2019-11-25 PROCEDURE — 77030036696 HC BOOT TRACT BUCKS S2SG -A

## 2019-11-25 PROCEDURE — 74011000302 HC RX REV CODE- 302: Performed by: INTERNAL MEDICINE

## 2019-11-25 PROCEDURE — 94760 N-INVAS EAR/PLS OXIMETRY 1: CPT

## 2019-11-25 PROCEDURE — 76010000153 HC OR TIME 1.5 TO 2 HR: Performed by: ORTHOPAEDIC SURGERY

## 2019-11-25 PROCEDURE — 85027 COMPLETE CBC AUTOMATED: CPT

## 2019-11-25 PROCEDURE — 96361 HYDRATE IV INFUSION ADD-ON: CPT | Performed by: EMERGENCY MEDICINE

## 2019-11-25 PROCEDURE — 77010033678 HC OXYGEN DAILY

## 2019-11-25 PROCEDURE — 96375 TX/PRO/DX INJ NEW DRUG ADDON: CPT | Performed by: EMERGENCY MEDICINE

## 2019-11-25 PROCEDURE — 87641 MR-STAPH DNA AMP PROBE: CPT

## 2019-11-25 PROCEDURE — 99285 EMERGENCY DEPT VISIT HI MDM: CPT | Performed by: EMERGENCY MEDICINE

## 2019-11-25 PROCEDURE — 77030018547 HC SUT ETHBND1 J&J -B: Performed by: ORTHOPAEDIC SURGERY

## 2019-11-25 PROCEDURE — 77030002933 HC SUT MCRYL J&J -A: Performed by: ORTHOPAEDIC SURGERY

## 2019-11-25 PROCEDURE — 74011250636 HC RX REV CODE- 250/636: Performed by: NURSE ANESTHETIST, CERTIFIED REGISTERED

## 2019-11-25 PROCEDURE — 76060000034 HC ANESTHESIA 1.5 TO 2 HR: Performed by: ORTHOPAEDIC SURGERY

## 2019-11-25 PROCEDURE — 82962 GLUCOSE BLOOD TEST: CPT

## 2019-11-25 PROCEDURE — 74011250637 HC RX REV CODE- 250/637: Performed by: INTERNAL MEDICINE

## 2019-11-25 PROCEDURE — 74011000250 HC RX REV CODE- 250: Performed by: NURSE ANESTHETIST, CERTIFIED REGISTERED

## 2019-11-25 PROCEDURE — 77030010509 HC AIRWY LMA MSK TELE -A: Performed by: ANESTHESIOLOGY

## 2019-11-25 PROCEDURE — 74011250636 HC RX REV CODE- 250/636: Performed by: INTERNAL MEDICINE

## 2019-11-25 PROCEDURE — 85610 PROTHROMBIN TIME: CPT

## 2019-11-25 PROCEDURE — 74011250636 HC RX REV CODE- 250/636: Performed by: ANESTHESIOLOGY

## 2019-11-25 PROCEDURE — 73502 X-RAY EXAM HIP UNI 2-3 VIEWS: CPT

## 2019-11-25 PROCEDURE — 74011250636 HC RX REV CODE- 250/636: Performed by: ORTHOPAEDIC SURGERY

## 2019-11-25 PROCEDURE — 77030013708 HC HNDPC SUC IRR PULS STRY –B: Performed by: ORTHOPAEDIC SURGERY

## 2019-11-25 PROCEDURE — 81001 URINALYSIS AUTO W/SCOPE: CPT

## 2019-11-25 PROCEDURE — 73552 X-RAY EXAM OF FEMUR 2/>: CPT

## 2019-11-25 PROCEDURE — 74011250636 HC RX REV CODE- 250/636: Performed by: EMERGENCY MEDICINE

## 2019-11-25 PROCEDURE — 77030006835 HC BLD SAW SAG STRY -B: Performed by: ORTHOPAEDIC SURGERY

## 2019-11-25 PROCEDURE — 80053 COMPREHEN METABOLIC PANEL: CPT

## 2019-11-25 PROCEDURE — 36415 COLL VENOUS BLD VENIPUNCTURE: CPT

## 2019-11-25 PROCEDURE — 86900 BLOOD TYPING SEROLOGIC ABO: CPT

## 2019-11-25 PROCEDURE — 0SRR0JZ REPLACEMENT OF RIGHT HIP JOINT, FEMORAL SURFACE WITH SYNTHETIC SUBSTITUTE, OPEN APPROACH: ICD-10-PCS | Performed by: ORTHOPAEDIC SURGERY

## 2019-11-25 PROCEDURE — 77030012935 HC DRSG AQUACEL BMS -B: Performed by: ORTHOPAEDIC SURGERY

## 2019-11-25 PROCEDURE — 77030010507 HC ADH SKN DERMBND J&J -B: Performed by: ORTHOPAEDIC SURGERY

## 2019-11-25 PROCEDURE — 71045 X-RAY EXAM CHEST 1 VIEW: CPT

## 2019-11-25 PROCEDURE — 65270000029 HC RM PRIVATE

## 2019-11-25 PROCEDURE — C1776 JOINT DEVICE (IMPLANTABLE): HCPCS | Performed by: ORTHOPAEDIC SURGERY

## 2019-11-25 PROCEDURE — 74011636637 HC RX REV CODE- 636/637: Performed by: INTERNAL MEDICINE

## 2019-11-25 PROCEDURE — 85025 COMPLETE CBC W/AUTO DIFF WBC: CPT

## 2019-11-25 PROCEDURE — 76210000006 HC OR PH I REC 0.5 TO 1 HR: Performed by: ORTHOPAEDIC SURGERY

## 2019-11-25 PROCEDURE — L1830 KO IMMOB CANVAS LONG PRE OTS: HCPCS | Performed by: ORTHOPAEDIC SURGERY

## 2019-11-25 PROCEDURE — 93005 ELECTROCARDIOGRAM TRACING: CPT | Performed by: EMERGENCY MEDICINE

## 2019-11-25 PROCEDURE — 77030002966 HC SUT PDS J&J -A: Performed by: ORTHOPAEDIC SURGERY

## 2019-11-25 PROCEDURE — 96374 THER/PROPH/DIAG INJ IV PUSH: CPT | Performed by: EMERGENCY MEDICINE

## 2019-11-25 PROCEDURE — 77030018836 HC SOL IRR NACL ICUM -A: Performed by: ORTHOPAEDIC SURGERY

## 2019-11-25 DEVICE — HEAD BPLR OD47MM ID28MM FEM HIP BRN POS ECC SELF CNTR: Type: IMPLANTABLE DEVICE | Site: FEMUR | Status: FUNCTIONAL

## 2019-11-25 DEVICE — STEM FEM SZ 13 L135MM NK L38.5MM 135DEG 41.5MM OFFSET STD: Type: IMPLANTABLE DEVICE | Site: FEMUR | Status: FUNCTIONAL

## 2019-11-25 DEVICE — ARTICUL/EZE FEMORAL HEAD DIAMETER 28MM +5 12/14 TAPER
Type: IMPLANTABLE DEVICE | Site: FEMUR | Status: FUNCTIONAL
Brand: ARTICUL/EZE

## 2019-11-25 RX ORDER — SODIUM CHLORIDE 0.9 % (FLUSH) 0.9 %
5-40 SYRINGE (ML) INJECTION AS NEEDED
Status: DISCONTINUED | OUTPATIENT
Start: 2019-11-25 | End: 2019-11-25 | Stop reason: HOSPADM

## 2019-11-25 RX ORDER — SIMVASTATIN 40 MG/1
40 TABLET, FILM COATED ORAL
Status: DISCONTINUED | OUTPATIENT
Start: 2019-11-25 | End: 2019-11-25

## 2019-11-25 RX ORDER — FERROUS SULFATE, DRIED 160(50) MG
1 TABLET, EXTENDED RELEASE ORAL
Status: DISCONTINUED | OUTPATIENT
Start: 2019-11-26 | End: 2019-11-27 | Stop reason: HOSPADM

## 2019-11-25 RX ORDER — LIDOCAINE HYDROCHLORIDE 20 MG/ML
INJECTION, SOLUTION EPIDURAL; INFILTRATION; INTRACAUDAL; PERINEURAL AS NEEDED
Status: DISCONTINUED | OUTPATIENT
Start: 2019-11-25 | End: 2019-11-25 | Stop reason: HOSPADM

## 2019-11-25 RX ORDER — ONDANSETRON 2 MG/ML
INJECTION INTRAMUSCULAR; INTRAVENOUS AS NEEDED
Status: DISCONTINUED | OUTPATIENT
Start: 2019-11-25 | End: 2019-11-25 | Stop reason: HOSPADM

## 2019-11-25 RX ORDER — EPHEDRINE SULFATE/0.9% NACL/PF 50 MG/5 ML
SYRINGE (ML) INTRAVENOUS AS NEEDED
Status: DISCONTINUED | OUTPATIENT
Start: 2019-11-25 | End: 2019-11-25 | Stop reason: HOSPADM

## 2019-11-25 RX ORDER — ACETAMINOPHEN 325 MG/1
650 TABLET ORAL EVERY 8 HOURS
Status: DISCONTINUED | OUTPATIENT
Start: 2019-11-25 | End: 2019-11-25 | Stop reason: HOSPADM

## 2019-11-25 RX ORDER — FENTANYL CITRATE 50 UG/ML
INJECTION, SOLUTION INTRAMUSCULAR; INTRAVENOUS AS NEEDED
Status: DISCONTINUED | OUTPATIENT
Start: 2019-11-25 | End: 2019-11-25 | Stop reason: HOSPADM

## 2019-11-25 RX ORDER — ENOXAPARIN SODIUM 100 MG/ML
40 INJECTION SUBCUTANEOUS DAILY
Status: DISCONTINUED | OUTPATIENT
Start: 2019-11-26 | End: 2019-11-27 | Stop reason: HOSPADM

## 2019-11-25 RX ORDER — LEVOTHYROXINE SODIUM 100 UG/1
100 TABLET ORAL
Status: DISCONTINUED | OUTPATIENT
Start: 2019-11-25 | End: 2019-11-27 | Stop reason: HOSPADM

## 2019-11-25 RX ORDER — MIDAZOLAM HYDROCHLORIDE 1 MG/ML
2 INJECTION, SOLUTION INTRAMUSCULAR; INTRAVENOUS
Status: DISCONTINUED | OUTPATIENT
Start: 2019-11-25 | End: 2019-11-25 | Stop reason: HOSPADM

## 2019-11-25 RX ORDER — DEXTROSE 50 % IN WATER (D50W) INTRAVENOUS SYRINGE
25-50 AS NEEDED
Status: DISCONTINUED | OUTPATIENT
Start: 2019-11-25 | End: 2019-11-27 | Stop reason: HOSPADM

## 2019-11-25 RX ORDER — SODIUM CHLORIDE 0.9 % (FLUSH) 0.9 %
5-40 SYRINGE (ML) INJECTION EVERY 8 HOURS
Status: DISCONTINUED | OUTPATIENT
Start: 2019-11-25 | End: 2019-11-25 | Stop reason: HOSPADM

## 2019-11-25 RX ORDER — OXYCODONE HYDROCHLORIDE 5 MG/1
5 TABLET ORAL
Status: DISCONTINUED | OUTPATIENT
Start: 2019-11-25 | End: 2019-11-25 | Stop reason: HOSPADM

## 2019-11-25 RX ORDER — PANTOPRAZOLE SODIUM 40 MG/1
40 TABLET, DELAYED RELEASE ORAL
Status: DISCONTINUED | OUTPATIENT
Start: 2019-11-25 | End: 2019-11-27

## 2019-11-25 RX ORDER — HYDROMORPHONE HYDROCHLORIDE 1 MG/ML
0.5 INJECTION, SOLUTION INTRAMUSCULAR; INTRAVENOUS; SUBCUTANEOUS
Status: DISCONTINUED | OUTPATIENT
Start: 2019-11-25 | End: 2019-11-27 | Stop reason: HOSPADM

## 2019-11-25 RX ORDER — MAG HYDROX/ALUMINUM HYD/SIMETH 200-200-20
30 SUSPENSION, ORAL (FINAL DOSE FORM) ORAL
Status: DISCONTINUED | OUTPATIENT
Start: 2019-11-25 | End: 2019-11-27 | Stop reason: HOSPADM

## 2019-11-25 RX ORDER — HYDROMORPHONE HYDROCHLORIDE 2 MG/ML
0.5 INJECTION, SOLUTION INTRAMUSCULAR; INTRAVENOUS; SUBCUTANEOUS
Status: DISCONTINUED | OUTPATIENT
Start: 2019-11-25 | End: 2019-11-25 | Stop reason: HOSPADM

## 2019-11-25 RX ORDER — ONDANSETRON 2 MG/ML
4 INJECTION INTRAMUSCULAR; INTRAVENOUS
Status: DISCONTINUED | OUTPATIENT
Start: 2019-11-25 | End: 2019-11-25 | Stop reason: HOSPADM

## 2019-11-25 RX ORDER — SODIUM CHLORIDE 0.9 % (FLUSH) 0.9 %
5-40 SYRINGE (ML) INJECTION EVERY 8 HOURS
Status: DISCONTINUED | OUTPATIENT
Start: 2019-11-25 | End: 2019-11-27 | Stop reason: HOSPADM

## 2019-11-25 RX ORDER — INSULIN LISPRO 100 [IU]/ML
INJECTION, SOLUTION INTRAVENOUS; SUBCUTANEOUS
Status: DISCONTINUED | OUTPATIENT
Start: 2019-11-25 | End: 2019-11-27 | Stop reason: HOSPADM

## 2019-11-25 RX ORDER — CEFAZOLIN SODIUM/WATER 2 G/20 ML
2 SYRINGE (ML) INTRAVENOUS
Status: COMPLETED | OUTPATIENT
Start: 2019-11-25 | End: 2019-11-25

## 2019-11-25 RX ORDER — ISOSORBIDE MONONITRATE 30 MG/1
60 TABLET, EXTENDED RELEASE ORAL DAILY
Status: DISCONTINUED | OUTPATIENT
Start: 2019-11-25 | End: 2019-11-27 | Stop reason: HOSPADM

## 2019-11-25 RX ORDER — METOPROLOL TARTRATE 50 MG/1
50 TABLET ORAL 2 TIMES DAILY
Status: DISCONTINUED | OUTPATIENT
Start: 2019-11-25 | End: 2019-11-27 | Stop reason: HOSPADM

## 2019-11-25 RX ORDER — SODIUM CHLORIDE, SODIUM LACTATE, POTASSIUM CHLORIDE, CALCIUM CHLORIDE 600; 310; 30; 20 MG/100ML; MG/100ML; MG/100ML; MG/100ML
INJECTION, SOLUTION INTRAVENOUS
Status: DISCONTINUED | OUTPATIENT
Start: 2019-11-25 | End: 2019-11-25 | Stop reason: HOSPADM

## 2019-11-25 RX ORDER — ONDANSETRON 2 MG/ML
4 INJECTION INTRAMUSCULAR; INTRAVENOUS
Status: COMPLETED | OUTPATIENT
Start: 2019-11-25 | End: 2019-11-25

## 2019-11-25 RX ORDER — MIDAZOLAM HYDROCHLORIDE 1 MG/ML
5 INJECTION, SOLUTION INTRAMUSCULAR; INTRAVENOUS ONCE
Status: DISCONTINUED | OUTPATIENT
Start: 2019-11-25 | End: 2019-11-25 | Stop reason: HOSPADM

## 2019-11-25 RX ORDER — CITALOPRAM 20 MG/1
20 TABLET, FILM COATED ORAL DAILY
Status: DISCONTINUED | OUTPATIENT
Start: 2019-11-25 | End: 2019-11-27 | Stop reason: HOSPADM

## 2019-11-25 RX ORDER — DEXTROSE 40 %
15 GEL (GRAM) ORAL AS NEEDED
Status: DISCONTINUED | OUTPATIENT
Start: 2019-11-25 | End: 2019-11-27 | Stop reason: HOSPADM

## 2019-11-25 RX ORDER — OXYCODONE HYDROCHLORIDE 5 MG/1
5 TABLET ORAL
Status: DISCONTINUED | OUTPATIENT
Start: 2019-11-25 | End: 2019-11-27 | Stop reason: HOSPADM

## 2019-11-25 RX ORDER — SODIUM CHLORIDE, SODIUM LACTATE, POTASSIUM CHLORIDE, CALCIUM CHLORIDE 600; 310; 30; 20 MG/100ML; MG/100ML; MG/100ML; MG/100ML
75 INJECTION, SOLUTION INTRAVENOUS CONTINUOUS
Status: DISCONTINUED | OUTPATIENT
Start: 2019-11-25 | End: 2019-11-25 | Stop reason: HOSPADM

## 2019-11-25 RX ORDER — FENTANYL CITRATE 50 UG/ML
100 INJECTION, SOLUTION INTRAMUSCULAR; INTRAVENOUS ONCE
Status: DISCONTINUED | OUTPATIENT
Start: 2019-11-25 | End: 2019-11-25 | Stop reason: HOSPADM

## 2019-11-25 RX ORDER — SODIUM CHLORIDE 9 MG/ML
75 INJECTION, SOLUTION INTRAVENOUS CONTINUOUS
Status: DISCONTINUED | OUTPATIENT
Start: 2019-11-25 | End: 2019-11-26

## 2019-11-25 RX ORDER — LIDOCAINE HYDROCHLORIDE 10 MG/ML
0.1 INJECTION INFILTRATION; PERINEURAL AS NEEDED
Status: DISCONTINUED | OUTPATIENT
Start: 2019-11-25 | End: 2019-11-25 | Stop reason: HOSPADM

## 2019-11-25 RX ORDER — PREDNISOLONE ACETATE 10 MG/ML
1 SUSPENSION/ DROPS OPHTHALMIC 4 TIMES DAILY
Status: DISCONTINUED | OUTPATIENT
Start: 2019-11-25 | End: 2019-11-25

## 2019-11-25 RX ORDER — SODIUM CHLORIDE, SODIUM LACTATE, POTASSIUM CHLORIDE, CALCIUM CHLORIDE 600; 310; 30; 20 MG/100ML; MG/100ML; MG/100ML; MG/100ML
75 INJECTION, SOLUTION INTRAVENOUS CONTINUOUS
Status: DISCONTINUED | OUTPATIENT
Start: 2019-11-25 | End: 2019-11-26

## 2019-11-25 RX ORDER — HYDROCODONE BITARTRATE AND ACETAMINOPHEN 5; 325 MG/1; MG/1
2 TABLET ORAL AS NEEDED
Status: DISCONTINUED | OUTPATIENT
Start: 2019-11-25 | End: 2019-11-25 | Stop reason: HOSPADM

## 2019-11-25 RX ORDER — BUPROPION HYDROCHLORIDE 150 MG/1
150 TABLET, EXTENDED RELEASE ORAL DAILY
Status: DISCONTINUED | OUTPATIENT
Start: 2019-11-25 | End: 2019-11-27 | Stop reason: HOSPADM

## 2019-11-25 RX ORDER — SODIUM CHLORIDE 9 MG/ML
75 INJECTION, SOLUTION INTRAVENOUS CONTINUOUS
Status: DISCONTINUED | OUTPATIENT
Start: 2019-11-25 | End: 2019-11-25 | Stop reason: HOSPADM

## 2019-11-25 RX ORDER — HYDROMORPHONE HYDROCHLORIDE 1 MG/ML
0.5 INJECTION, SOLUTION INTRAMUSCULAR; INTRAVENOUS; SUBCUTANEOUS
Status: COMPLETED | OUTPATIENT
Start: 2019-11-25 | End: 2019-11-25

## 2019-11-25 RX ORDER — PROPOFOL 10 MG/ML
INJECTION, EMULSION INTRAVENOUS AS NEEDED
Status: DISCONTINUED | OUTPATIENT
Start: 2019-11-25 | End: 2019-11-25 | Stop reason: HOSPADM

## 2019-11-25 RX ORDER — HYDROMORPHONE HYDROCHLORIDE 1 MG/ML
0.5 INJECTION, SOLUTION INTRAMUSCULAR; INTRAVENOUS; SUBCUTANEOUS
Status: DISCONTINUED | OUTPATIENT
Start: 2019-11-25 | End: 2019-11-25 | Stop reason: HOSPADM

## 2019-11-25 RX ORDER — SODIUM CHLORIDE 0.9 % (FLUSH) 0.9 %
5-40 SYRINGE (ML) INJECTION AS NEEDED
Status: DISCONTINUED | OUTPATIENT
Start: 2019-11-25 | End: 2019-11-27 | Stop reason: HOSPADM

## 2019-11-25 RX ADMIN — ONDANSETRON 4 MG: 2 INJECTION INTRAMUSCULAR; INTRAVENOUS at 04:27

## 2019-11-25 RX ADMIN — Medication 10 MG: at 17:47

## 2019-11-25 RX ADMIN — FENTANYL CITRATE 50 MCG: 50 INJECTION INTRAMUSCULAR; INTRAVENOUS at 17:15

## 2019-11-25 RX ADMIN — INSULIN LISPRO 2 UNITS: 100 INJECTION, SOLUTION INTRAVENOUS; SUBCUTANEOUS at 23:23

## 2019-11-25 RX ADMIN — METOPROLOL TARTRATE 50 MG: 50 TABLET, FILM COATED ORAL at 20:55

## 2019-11-25 RX ADMIN — ISOSORBIDE MONONITRATE 60 MG: 30 TABLET, EXTENDED RELEASE ORAL at 09:33

## 2019-11-25 RX ADMIN — ONDANSETRON 4 MG: 2 INJECTION INTRAMUSCULAR; INTRAVENOUS at 17:23

## 2019-11-25 RX ADMIN — Medication 10 MG: at 16:57

## 2019-11-25 RX ADMIN — SODIUM CHLORIDE, SODIUM LACTATE, POTASSIUM CHLORIDE, AND CALCIUM CHLORIDE 75 ML/HR: 600; 310; 30; 20 INJECTION, SOLUTION INTRAVENOUS at 20:55

## 2019-11-25 RX ADMIN — HYDROMORPHONE HYDROCHLORIDE 0.5 MG: 1 INJECTION, SOLUTION INTRAMUSCULAR; INTRAVENOUS; SUBCUTANEOUS at 16:17

## 2019-11-25 RX ADMIN — Medication 2 G: at 17:03

## 2019-11-25 RX ADMIN — SODIUM CHLORIDE 150 ML/HR: 900 INJECTION, SOLUTION INTRAVENOUS at 04:28

## 2019-11-25 RX ADMIN — PHENYLEPHRINE HYDROCHLORIDE 60 MCG: 10 INJECTION INTRAVENOUS at 16:57

## 2019-11-25 RX ADMIN — HYDROMORPHONE HYDROCHLORIDE 0.5 MG: 1 INJECTION, SOLUTION INTRAMUSCULAR; INTRAVENOUS; SUBCUTANEOUS at 04:27

## 2019-11-25 RX ADMIN — SODIUM CHLORIDE, SODIUM LACTATE, POTASSIUM CHLORIDE, AND CALCIUM CHLORIDE: 600; 310; 30; 20 INJECTION, SOLUTION INTRAVENOUS at 16:42

## 2019-11-25 RX ADMIN — Medication 10 MG: at 17:16

## 2019-11-25 RX ADMIN — ACETAMINOPHEN 650 MG: 325 TABLET, FILM COATED ORAL at 15:11

## 2019-11-25 RX ADMIN — BUPROPION HYDROCHLORIDE 150 MG: 150 TABLET, EXTENDED RELEASE ORAL at 09:33

## 2019-11-25 RX ADMIN — SODIUM CHLORIDE, SODIUM LACTATE, POTASSIUM CHLORIDE, AND CALCIUM CHLORIDE 75 ML/HR: 600; 310; 30; 20 INJECTION, SOLUTION INTRAVENOUS at 15:15

## 2019-11-25 RX ADMIN — OXYCODONE HYDROCHLORIDE 5 MG: 5 TABLET ORAL at 09:48

## 2019-11-25 RX ADMIN — SODIUM CHLORIDE 150 ML/HR: 900 INJECTION, SOLUTION INTRAVENOUS at 06:25

## 2019-11-25 RX ADMIN — PHENYLEPHRINE HYDROCHLORIDE 60 MCG: 10 INJECTION INTRAVENOUS at 17:47

## 2019-11-25 RX ADMIN — PROPOFOL 100 MG: 10 INJECTION, EMULSION INTRAVENOUS at 16:53

## 2019-11-25 RX ADMIN — CITALOPRAM HYDROBROMIDE 20 MG: 20 TABLET ORAL at 09:33

## 2019-11-25 RX ADMIN — Medication 10 ML: at 06:54

## 2019-11-25 RX ADMIN — METOPROLOL TARTRATE 50 MG: 50 TABLET, FILM COATED ORAL at 09:33

## 2019-11-25 RX ADMIN — Medication 10 ML: at 23:33

## 2019-11-25 RX ADMIN — TUBERCULIN PURIFIED PROTEIN DERIVATIVE 5 UNITS: 5 INJECTION, SOLUTION INTRADERMAL at 06:22

## 2019-11-25 RX ADMIN — LIDOCAINE HYDROCHLORIDE 80 MG: 20 INJECTION, SOLUTION EPIDURAL; INFILTRATION; INTRACAUDAL; PERINEURAL at 16:53

## 2019-11-25 RX ADMIN — Medication 10 MG: at 17:14

## 2019-11-25 NOTE — PROGRESS NOTES
TRANSFER - IN REPORT: 
 
Verbal report received from deuce loya on Keena F Branham  being received from ed for routine progression of care Report consisted of patients Situation, Background, Assessment and  
Recommendations(SBAR). Information from the following report(s) SBAR, Kardex, ED Summary, Intake/Output, MAR and Med Rec Status was reviewed with the receiving nurse. Opportunity for questions and clarification was provided. Assessment completed upon patients arrival to unit and care assumed.

## 2019-11-25 NOTE — PROGRESS NOTES
Chart screened by  for discharge planning. Pt was admitted for surgical repair of a hip fracture. She is scheduled for surgery today. PT/OT will be ordered post-op. PPD ordered today. Pt most likely will require STR at discharge. Pt's insurance will require precert. SW to provide pt/family with list of SNFs in network with pt's insurance and will follow up with pt/family tomorrow to discuss facility preferences. SW following. Care Management Interventions PCP Verified by CM: Yes Last Visit to PCP: 10/18/19 Mode of Transport at Discharge: BLS Transition of Care Consult (CM Consult): Discharge Planning Discharge Location Discharge Placement: Rehab Unit Subacute

## 2019-11-25 NOTE — ED PROVIDER NOTES
78-year-old female presents from her nursing facility with complaints of right hip pain Patient reports that she was walking to her bathroom when she tripped and fell on her right hip Denies other injuries Patient is on Plavix but no other anticoagulation therapy Denies head injury. Patient did not feel lightheaded dizzy or nauseous before or after the event. The history is provided by the patient, a relative, the EMS personnel and the nursing home. Fall The accident occurred 1 to 2 hours ago. The fall occurred while walking. She fell from a height of ground level. She landed on hard floor. The point of impact was the right hip. The pain is present in the right hip. The pain is moderate. She was not ambulatory at the scene. There was no entrapment after the fall. There was no drug use involved in the accident. There was no alcohol use involved in the accident. Pertinent negatives include no fever, no abdominal pain, no vomiting, no headaches, no extremity weakness, no loss of consciousness and no laceration. The risk factors include dementia and being elderly. The symptoms are aggravated by pressure on injury. She has tried nothing for the symptoms. Past Medical History:  
Diagnosis Date  Alzheimer's dementia ([address]  Anxiety state, unspecified  CAD (coronary artery disease)  Diabetes ([address] 8/27/2009  GERD (gastroesophageal reflux disease)  History of snoring 7/13/2016  History of stroke 10/19/2016  Hypertension  Hypothyroid 8/27/2009  Late onset Alzheimer's disease without behavioral disturbance ([address] 07/12/2012  Mild dementia ([address] 8/30/2016  Mixed hyperlipidemia  Osteopenia  Reactive depression 5/16/2016  Retinopathy due to secondary diabetes ([address] 8/27/2009  Sensory polyneuropathy 8/30/2016 Past Surgical History:  
Procedure Laterality Date  HX ANGIOPLASTY  2009 STENTING-post CABG  
 HX CHOLECYSTECTOMY  1993  LAPAROSCOPIC  
  HX CORONARY ARTERY BYPASS GRAFT  2009  
 triple [id number]-[id number] thereafter  HX HEENT    
 right eyex  2-laser surgery?  HX KNEE ARTHROSCOPY Right  HX OTHER SURGICAL  2008  
 cataract extraction and iol implant  HX OTHER SURGICAL Carotid stent 3  
 HX THYROIDECTOMY  HX TONSILLECTOMY  as child  HX TOTAL VAGINAL HYSTERECTOMY    
 hysterectomy  HX UROLOGICAL    
 bladder tack Family History:  
Problem Relation Age of Onset  Heart Disease Mother CHF  Lung Disease Mother  COPD Mother  Heart Disease Father CAD  Cancer Father LUNG  
 Heart Disease Maternal Grandfather  Diabetes Maternal Grandfather  Heart Disease Paternal Grandfather  Breast Cancer Child 48  Hypertension Sister  Other Sister TACHYCARDIA  Diabetes Maternal Grandmother  Arthritis-osteo Sister  Malignant Hyperthermia Neg Hx  Pseudocholinesterase Deficiency Neg Hx  Delayed Awakening Neg Hx  Post-op Nausea/Vomiting Neg Hx  Emergence Delirium Neg Hx  Post-op Cognitive Dysfunction Neg Hx Social History Socioeconomic History  Marital status:  Spouse name: Not on file  Number of children: Not on file  Years of education: Not on file  Highest education level: Not on file Occupational History  Not on file Social Needs  Financial resource strain: Not on file  Food insecurity:  
  Worry: Not on file Inability: Not on file  Transportation needs:  
  Medical: Not on file Non-medical: Not on file Tobacco Use  Smoking status: Never Smoker  Smokeless tobacco: Never Used Substance and Sexual Activity  Alcohol use: No  
 Drug use: No  
 Sexual activity: Not Currently Partners: Male Lifestyle  Physical activity:  
  Days per week: Not on file Minutes per session: Not on file  Stress: Not on file Relationships  Social connections: Talks on phone: Not on file Gets together: Not on file Attends Advent service: Not on file Active member of club or organization: Not on file Attends meetings of clubs or organizations: Not on file Relationship status: Not on file  Intimate partner violence:  
  Fear of current or ex partner: Not on file Emotionally abused: Not on file Physically abused: Not on file Forced sexual activity: Not on file Other Topics Concern  Not on file Social History Narrative  Not on file ALLERGIES: Ace inhibitors; Aricept [donepezil]; Dextrose; Gatifloxacin; Levaquin [levofloxacin]; Macrodantin [nitrofurantoin macrocrystalline]; Morphine; Namenda [memantine]; Carmencita; and Sulfa (sulfonamide antibiotics) Review of Systems Unable to perform ROS: Dementia Constitutional: Negative for chills and fever. HENT: Negative for congestion, ear pain and rhinorrhea. Eyes: Negative for photophobia and discharge. Respiratory: Negative for cough and shortness of breath. Cardiovascular: Negative for chest pain and palpitations. Gastrointestinal: Negative for abdominal pain, constipation, diarrhea and vomiting. Endocrine: Negative for cold intolerance and heat intolerance. Genitourinary: Negative for dysuria and flank pain. Musculoskeletal: Negative for arthralgias, extremity weakness, myalgias and neck pain. Skin: Negative for rash and wound. Allergic/Immunologic: Negative for environmental allergies and food allergies. Neurological: Negative for loss of consciousness, syncope and headaches. Hematological: Negative for adenopathy. Does not bruise/bleed easily. Psychiatric/Behavioral: Negative for dysphoric mood. The patient is not nervous/anxious. All other systems reviewed and are negative. Vitals:  
 11/25/19 0315 BP: 199/81 Pulse: 70 Resp: 16 Temp: 98.3 °F (36.8 °C) SpO2: 93% Weight: 58.1 kg (128 lb) Height: 5' 3\" (1.6 m) Physical Exam 
Vitals signs and nursing note reviewed. Constitutional:   
   General: She is in acute distress. Appearance: Normal appearance. She is well-developed. HENT:  
   Head: Normocephalic and atraumatic. Right Ear: External ear normal.  
   Left Ear: External ear normal.  
   Nose: Nose normal.  
   Mouth/Throat:  
   Mouth: Mucous membranes are moist.  
   Pharynx: Oropharynx is clear. No oropharyngeal exudate. Eyes:  
   Extraocular Movements: Extraocular movements intact. Conjunctiva/sclera: Conjunctivae normal.  
   Pupils: Pupils are equal, round, and reactive to light. Neck: Musculoskeletal: Normal range of motion and neck supple. Vascular: No JVD. Cardiovascular:  
   Rate and Rhythm: Normal rate and regular rhythm. Heart sounds: Normal heart sounds. No murmur. No friction rub. No gallop. Pulmonary:  
   Effort: Pulmonary effort is normal.  
   Breath sounds: Normal breath sounds. Abdominal:  
   General: Bowel sounds are normal. There is no distension. Palpations: Abdomen is soft. There is no mass. Tenderness: There is no tenderness. Musculoskeletal:     
   General: No deformity. Right hip: She exhibits decreased range of motion, tenderness and bony tenderness. Legs: 
 
Skin: 
   General: Skin is warm and dry. Capillary Refill: Capillary refill takes less than 2 seconds. Findings: No laceration or rash. Neurological:  
   General: No focal deficit present. Mental Status: She is alert. She is disoriented and confused. GCS: GCS eye subscore is 4. GCS verbal subscore is 5. GCS motor subscore is 6. Cranial Nerves: Cranial nerves are intact. No cranial nerve deficit. Sensory: Sensation is intact. No sensory deficit. Motor: Motor function is intact.   
   Gait: Gait abnormal.  
Psychiatric:     
   Attention and Perception: Attention normal.     
   Mood and Affect: Mood normal.     
 Speech: Speech normal.     
   Behavior: Behavior normal. Behavior is cooperative. Thought Content: Thought content normal.     
   Cognition and Memory: Memory is impaired. Judgment: Judgment normal.  
 
  
 
MDM Number of Diagnoses or Management Options Closed right hip fracture, initial encounter [facility]): new and requires workup Diagnosis management comments: 78-year-old female brought from her nursing facility status post fall Right femoral neck fracture identified We will begin medical work-up and admission to the hospital service EKG reviewed Sinus rhythm left axis deviation No ectopy No acute ischemic changes Amount and/or Complexity of Data Reviewed Clinical lab tests: ordered and reviewed Tests in the radiology section of CPT®: ordered and reviewed Tests in the medicine section of CPT®: ordered and reviewed Obtain history from someone other than the patient: yes Review and summarize past medical records: yes Discuss the patient with other providers: yes Independent visualization of images, tracings, or specimens: yes Risk of Complications, Morbidity, and/or Mortality Presenting problems: moderate Diagnostic procedures: moderate Management options: moderate General comments: Elements of this note have been dictated via voice recognition software. Text and phrases may be limited by the accuracy of the software. The chart has been reviewed, but errors may still be present. Patient Progress Patient progress: improved Procedures

## 2019-11-25 NOTE — H&P
Hospitalist Note Admit Date:  2019  3:09 AM  
Name:  Lisa Kaiser Age:  78 y.o. 
:  1940 MRN:  [id number] PCP:  Ivelisse Davidson MD 
 
Subjective:  
 
77 y/o F with dementia, [facility] resident, CAD/CABG x3, hx CVA, DM, who presented to ER after fall at home. She was getting up around 2am to adjust the heat when she fell on her R side, landed on her R hip with severe sharp pain following. No head trauma/LOC. Unable to bear weight. No preceding symptoms. She managed to get to her door and open it; staff desk is right across from her room and she was brought to ER Today, pain controlled, no CP/SOB or cough, bedresting Objective:  
 
Patient Vitals for the past 24 hrs: 
 Temp Pulse Resp BP SpO2  
19 1107 98.2 °F (36.8 °C) 71 18 118/51 91 % 19 0738     95 % 19 0725 97.8 °F (36.6 °C) 77 17 138/56 91 % 19 0627 98 °F (36.7 °C) 76 18 147/73 93 % 19 0539 98.3 °F (36.8 °C) 73 17 147/65 96 % 19 0520  73 18 141/65 98 % 19 0458  72 20 159/67 98 % 19 0315 98.3 °F (36.8 °C) 70 16 199/81 93 % Oxygen Therapy O2 Sat (%): 91 % (19 1107) Pulse via Oximetry: 73 beats per minute (19 0539) O2 Device: Nasal cannula (19 0738) O2 Flow Rate (L/min): 1 l/min(weaned from 2lpm) (19 9820) No intake or output data in the 24 hours ending 19 1200 Physical Exam: 
General:    Well nourished. Alert. Mild distress CV:   RRR. No murmur, rub, or gallop. Lungs:  Clear to auscultation bilaterally. No wheezing, rhonchi, or rales. Abdomen: Soft, nontender, nondistended. Extremities: Warm and dry. No cyanosis or edema. RLE externally rotated Neurologic: grossly intact. Skin:     No rashes or jaundice. No wounds. Psych:  Normal mood and affect. I reviewed the labs, imaging, EKGs, telemetry, and other studies done this admission. Data Review:  
Recent Results (from the past 24 hour(s)) EKG, 12 LEAD, INITIAL Collection Time: 11/25/19  4:23 AM  
Result Value Ref Range Ventricular Rate 76 BPM  
 Atrial Rate 76 BPM  
 P-R Interval 160 ms QRS Duration 82 ms Q-T Interval 386 ms QTC Calculation (Bezet) 434 ms Calculated P Axis 76 degrees Calculated R Axis -32 degrees Calculated T Axis 57 degrees Diagnosis    
  !! AGE AND GENDER SPECIFIC ECG ANALYSIS !! Normal sinus rhythm Left axis deviation Nonspecific ST depression When compared with ECG of 01-OCT-2019 16:35, No significant change was found Confirmed by Ann Coleman MD (), AURELIA CHRISTIE ([id number]) on 11/25/2019 7:31:44 AM 
  
CBC W/O DIFF Collection Time: 11/25/19  4:26 AM  
Result Value Ref Range WBC 10.1 4.3 - 11.1 K/uL  
 RBC 4.32 4.05 - 5.2 M/uL  
 HGB 13.1 11.7 - 15.4 g/dL HCT 40.0 35.8 - 46.3 % MCV 92.6 79.6 - 97.8 FL  
 MCH 30.3 26.1 - 32.9 PG  
 MCHC 32.8 31.4 - 35.0 g/dL  
 RDW 12.4 11.9 - 14.6 % PLATELET 559 086 - 085 K/uL MPV 11.0 9.4 - 12.3 FL ABSOLUTE NRBC 0.00 0.0 - 0.2 K/uL PROTHROMBIN TIME + INR Collection Time: 11/25/19  4:26 AM  
Result Value Ref Range Prothrombin time 13.0 11.7 - 14.5 sec INR 1.0    
URINALYSIS W/ RFLX MICROSCOPIC Collection Time: 11/25/19  4:57 AM  
Result Value Ref Range Color YELLOW Appearance CLEAR Specific gravity 1.010 1.001 - 1.023    
 pH (UA) 6.5 5.0 - 9.0 Protein NEGATIVE  NEG mg/dL Glucose NEGATIVE  mg/dL Ketone NEGATIVE  NEG mg/dL Bilirubin NEGATIVE  NEG Blood MODERATE (A) NEG Urobilinogen 0.2 0.2 - 1.0 EU/dL Nitrites NEGATIVE  NEG Leukocyte Esterase NEGATIVE  NEG    
 WBC 0-3 0 /hpf  
 RBC 5-10 0 /hpf Epithelial cells 0-3 0 /hpf Bacteria 0 0 /hpf Casts 0-3 0 /lpf MSSA/MRSA SC BY PCR, NASAL SWAB Collection Time: 11/25/19  6:24 AM  
Result Value Ref Range Special Requests: NO SPECIAL REQUESTS Culture result: SA target not detected. A MRSA NEGATIVE, SA NEGATIVE test result does not preclude MRSA or SA nasal colonization. METABOLIC PANEL, COMPREHENSIVE Collection Time: 11/25/19  7:17 AM  
Result Value Ref Range Sodium 141 136 - 145 mmol/L Potassium 4.3 3.5 - 5.1 mmol/L Chloride 108 (H) 98 - 107 mmol/L  
 CO2 26 21 - 32 mmol/L Anion gap 7 7 - 16 mmol/L Glucose 190 (H) 65 - 100 mg/dL BUN 23 8 - 23 MG/DL Creatinine 1.14 (H) 0.6 - 1.0 MG/DL  
 GFR est AA 59 (L) >60 ml/min/1.73m2 GFR est non-AA 49 (L) >60 ml/min/1.73m2 Calcium 9.3 8.3 - 10.4 MG/DL Bilirubin, total 0.6 0.2 - 1.1 MG/DL  
 ALT (SGPT) 21 12 - 65 U/L  
 AST (SGOT) 11 (L) 15 - 37 U/L Alk. phosphatase 84 50 - 136 U/L Protein, total 6.4 6.3 - 8.2 g/dL Albumin 3.5 3.2 - 4.6 g/dL Globulin 2.9 2.3 - 3.5 g/dL A-G Ratio 1.2 1.2 - 3.5 GLUCOSE, POC Collection Time: 11/25/19  7:23 AM  
Result Value Ref Range Glucose (POC) 223 (H) 65 - 100 mg/dL GLUCOSE, POC Collection Time: 11/25/19 11:03 AM  
Result Value Ref Range Glucose (POC) 207 (H) 65 - 100 mg/dL All Micro Results Procedure Component Value Units Date/Time MSSA/MRSA SC BY PCR, NASAL SWAB [[id number]] Collected:  11/25/19 8732 Order Status:  Completed Specimen:  Nasal swab Updated:  11/25/19 1008 Special Requests: NO SPECIAL REQUESTS Culture result:    
  SA target not detected. A MRSA NEGATIVE, SA NEGATIVE test result does not preclude MRSA or SA nasal colonization. Current Facility-Administered Medications Medication Dose Route Frequency  0.9% sodium chloride infusion  75 mL/hr IntraVENous CONTINUOUS  
 buPROPion SR (WELLBUTRIN SR) tablet 150 mg  150 mg Oral DAILY  citalopram (CELEXA) tablet 20 mg  20 mg Oral DAILY  isosorbide mononitrate ER (IMDUR) tablet 60 mg  60 mg Oral DAILY  levothyroxine (SYNTHROID) tablet 100 mcg  100 mcg Oral ACB  metoprolol tartrate (LOPRESSOR) tablet 50 mg  50 mg Oral BID  pantoprazole (PROTONIX) tablet 40 mg  40 mg Oral ACB  sodium chloride (NS) flush 5-40 mL  5-40 mL IntraVENous Q8H  
 sodium chloride (NS) flush 5-40 mL  5-40 mL IntraVENous PRN  
 acetaminophen (TYLENOL) tablet 650 mg  650 mg Oral Q8H  
 oxyCODONE IR (ROXICODONE) tablet 5 mg  5 mg Oral Q4H PRN  
 HYDROmorphone (PF) (DILAUDID) injection 0.5 mg  0.5 mg IntraVENous Q4H PRN  
 ondansetron (ZOFRAN) injection 4 mg  4 mg IntraVENous Q4H PRN  
 tuberculin injection 5 Units  5 Units IntraDERMal ONCE  
 insulin lispro (HUMALOG) injection   SubCUTAneous AC&HS  
 dextrose 40% (GLUTOSE) oral gel 1 Tube  15 g Oral PRN  
 glucagon (GLUCAGEN) injection 1 mg  1 mg IntraMUSCular PRN  
 dextrose (D50W) injection syrg 12.5-25 g  25-50 mL IntraVENous PRN Other Studies: Xr Chest Sngl V Result Date: 11/25/2019 EXAM: Chest x-ray. INDICATION: Preoperative evaluation. COMPARISON: October 1, 2019. TECHNIQUE: Single frontal view. FINDINGS: The lungs are clear. The cardiac size, mediastinal contour and pulmonary vasculature are normal. No pneumothorax or pleural effusion is seen. Again noted are sternotomy wires. IMPRESSION: No acute process. Xr Hip Rt W Or Wo Pelv 2-3 Vws Result Date: 11/25/2019 EXAM: Pelvis and right hip x-rays. INDICATION: Pain after falling. COMPARISON: None. TECHNIQUE: A frontal x-ray of the pelvis was supplemented with 2 dedicated views of the right hip joint. FINDINGS: There is a mildly displaced right femoral neck fracture. No other fractures are identified. The pelvic ring is intact. IMPRESSION: Right femoral neck fracture. Xr Femur Rt 2 Vs 
 
Result Date: 11/25/2019 EXAM: Right femur x-rays. INDICATION: Pain after falling. COMPARISON: Subsequent dedicated right hip x-rays. TECHNIQUE: 4 views.  FINDINGS: As noted on the right hip x-ray report, there is a right femoral neck fracture. No fractures are seen in the more distal right femur. IMPRESSION: As above. Assessment and Plan:  
 
Hospital Problems as of 11/25/2019 Date Reviewed: 4/20/2017 Codes Class Noted - Resolved POA Hip fracture requiring operative repair [facility]) ICD-10-CM: O89.729M ICD-9-CM: 820.8  11/25/2019 - Present Yes * (Principal) Closed fracture of neck of right femur ([facility] [address] ICD-10-CM: S72.001A ICD-9-CM: 820.8  11/25/2019 - Present Yes Type 2 diabetes with nephropathy (HCC) (Chronic) ICD-10-CM: E11.21 
ICD-9-CM: 250.40, 583.81  3/13/2018 - Present Yes Late onset Alzheimer's disease without behavioral disturbance (HCC) (Chronic) ICD-10-CM: G30.1, F02.80 ICD-9-CM: 331.0, 294.10  7/12/2017 - Present Yes History of stroke (Chronic) ICD-10-CM: C68.03 
ICD-9-CM: V12.54  10/19/2016 - Present Yes Hypothyroidism, adult (Chronic) ICD-10-CM: E03.9 ICD-9-CM: 244.9  9/23/2015 - Present Yes CAD (coronary artery disease) (Chronic) ICD-10-CM: I25.10 ICD-9-CM: 414.00  8/27/2009 - Present Yes Essential hypertension (Chronic) ICD-10-CM: I10 
ICD-9-CM: 401.9  8/27/2009 - Present Yes Retinopathy due to secondary diabetes ([facility] [address] (Chronic) ICD-10-CM: G06.046 ICD-9-CM: 249.50, 362.01  8/27/2009 - Present Yes Dx; 
1- Right hip fx due to mechanical fall. 2- HTN and NIDDM, controlled 3- CAD, stable 4- Alzheimer dementia Rx: 
Bedrest 
IVF Pain control Orthopedic following Pt is moderate risk for surgery due to her age and cardiac hx. Benefits of surgery outweigh risks. Dispo: rehab Signed: 
Suzanne Eubanks MD

## 2019-11-25 NOTE — PERIOP NOTES
Placed call to Dr. Tyler Sadler regarding pt complaining of pain unrelieved by Tylenol. Per Dr. Tyler Sadler, give 0.5 mg of Dilaudid IV and if pain is unrelieved ok to give 0.5 mg Dilaudid IV 15 mins after first dose.

## 2019-11-25 NOTE — ANESTHESIA PREPROCEDURE EVALUATION
Relevant Problems No relevant active problems Anesthetic History PONV Review of Systems / Medical History Patient summary reviewed Pulmonary Neuro/Psych Dementia Comments: Alzheimer's Cardiovascular Hypertension: well controlled Cardiac stents, CABG and hyperlipidemia Exercise tolerance: <4 METS Comments: CABG in 2009 Last stent in 2009, still on Plavix GI/Hepatic/Renal 
  
GERD: well controlled Endo/Other Diabetes: type 2 Hypothyroidism Other Findings Physical Exam 
 
Airway Mallampati: II 
TM Distance: 4 - 6 cm Neck ROM: normal range of motion Mouth opening: Normal 
 
 Cardiovascular Rhythm: regular Dental 
No notable dental hx Pulmonary Breath sounds clear to auscultation Abdominal 
GI exam deferred Other Findings Anesthetic Plan ASA: 3 Anesthesia type: general 
 
 
 
 
 
Anesthetic plan and risks discussed with: Patient and Son / Daughter

## 2019-11-25 NOTE — PROGRESS NOTES
Initial visit to assess pt's spiritual needs. Pt    In surgery, no family members present. Left a card.  
 
 
Chaplain Xuan Marroquin MDiv,ThM,PhD

## 2019-11-25 NOTE — CONSULTS
FRACTURE CONSULT NOTE Subjective:  
 
Date of Consultation:  November 25, 2019 Referring Physician:  Dre Hendricks Chiqui Kee is a 78 y.o. female who is being seen for right hip pain and fracture. Injury occurred  on 11/24 Workup has revealedfemoral neck  Fracture right. Patient's ambulatory status includeswalker} and their home environment is SNF. Pt is an 79 y/o F with dementia, skilled nursing resident, CAD/CABG x3, hx CVA, DM, who presented to ER after fall at home. She was getting up around 2am to adjust the heat when she fell on her R side, landed on her R hip with severe sharp pain following. No head trauma/LOC. Unable to bear weight. No preceding symptoms. She managed to get to her door and open it; staff desk is right across from her room and she was brought to ER Patient Active Problem List  
 Diagnosis Date Noted  Hip fracture requiring operative repair ([address] 11/25/2019  Closed fracture of neck of right femur ([address] 11/25/2019  Contusion, buttock, initial encounter 03/27/2018  Type 2 diabetes with nephropathy ([address] 03/13/2018  Acute cystitis without hematuria 02/08/2018  Non-intractable cyclical vomiting with nausea 07/17/2017  Late onset Alzheimer's disease without behavioral disturbance ([address] 07/12/2017  Diarrhea 02/16/2017  Fall 01/25/2017  Imbalance 01/25/2017  History of stroke 10/19/2016  Sensory polyneuropathy 08/30/2016  JUDD (obstructive sleep apnea) 07/13/2016  Dermatitis 05/23/2016  Controlled type 2 diabetes mellitus with complication, without long-term current use of insulin ([address] 05/16/2016  Unipolar depression ([address] 05/16/2016  Hypothyroidism, adult 09/23/2015  Unspecified adjustment reaction  Anxiety state  Mixed hyperlipidemia  Memory loss  Osteopenia  CAD (coronary artery disease) 08/27/2009  Essential hypertension 08/27/2009  Dyslipidemia 08/27/2009  GERD (gastroesophageal reflux disease) 08/27/2009  Retinopathy due to secondary diabetes ([address] 08/27/2009 Family History Problem Relation Age of Onset  Heart Disease Mother CHF  Lung Disease Mother  COPD Mother  Heart Disease Father CAD  Cancer Father LUNG  
 Heart Disease Maternal Grandfather  Diabetes Maternal Grandfather  Heart Disease Paternal Grandfather  Breast Cancer Child 48  Hypertension Sister  Other Sister TACHYCARDIA  Diabetes Maternal Grandmother  Arthritis-osteo Sister  Malignant Hyperthermia Neg Hx  Pseudocholinesterase Deficiency Neg Hx  Delayed Awakening Neg Hx  Post-op Nausea/Vomiting Neg Hx  Emergence Delirium Neg Hx  Post-op Cognitive Dysfunction Neg Hx Social History Tobacco Use  Smoking status: Never Smoker  Smokeless tobacco: Never Used Substance Use Topics  Alcohol use: No  
 
Past Medical History:  
Diagnosis Date  Alzheimer's dementia ([address]  Anxiety state, unspecified  CAD (coronary artery disease)  Diabetes ([address] 8/27/2009  GERD (gastroesophageal reflux disease)  History of snoring 7/13/2016  History of stroke 10/19/2016  Hypertension  Hypothyroid 8/27/2009  Late onset Alzheimer's disease without behavioral disturbance ([address] 07/12/2012  Mild dementia ([address] 8/30/2016  Mixed hyperlipidemia  Osteopenia  Reactive depression 5/16/2016  Retinopathy due to secondary diabetes ([address] 8/27/2009  Sensory polyneuropathy 8/30/2016 Past Surgical History:  
Procedure Laterality Date  HX ANGIOPLASTY  2009 STENTING-post CABG  
 HX CHOLECYSTECTOMY  1993 LAPAROSCOPIC  
 HX CORONARY ARTERY BYPASS GRAFT  2009  
 triple [id number]-Esmqwn-[id number] thereafter  HX HEENT    
 right eyex  2-laser surgery?  HX KNEE ARTHROSCOPY Right  HX OTHER SURGICAL  2008  
 cataract extraction and iol implant  HX OTHER SURGICAL Carotid stent 3  
 HX THYROIDECTOMY  HX TONSILLECTOMY  as child  HX TOTAL VAGINAL HYSTERECTOMY    
 hysterectomy  HX UROLOGICAL    
 bladder tack Prior to Admission medications Medication Sig Start Date End Date Taking? Authorizing Provider  
galantamine (RAZADYNE) 4 mg tablet TAKE 1 TAB BY MOUTH TWO (2) TIMES A DAY. 6/21/19   Dustin Fall MD  
levothyroxine (SYNTHROID) 100 mcg tablet Take 1 Tab by mouth Daily (before breakfast). 12/3/18   Maura Sheriff MD  
metFORMIN ER (GLUCOPHAGE XR) 500 mg tablet Take 1 Tab by mouth two (2) times a day. 12/3/18   Maura Sheriff MD  
citalopram (CELEXA) 20 mg tablet Take 1 Tab by mouth daily. 12/3/18   Maura Sheriff MD  
simvastatin (ZOCOR) 40 mg tablet Take 1 Tab by mouth nightly. 12/3/18   Maura Sheriff MD  
omeprazole (PRILOSEC) 20 mg capsule Take 1 Cap by mouth daily. 12/3/18   Maura Sheriff MD  
metoprolol tartrate (LOPRESSOR) 50 mg tablet Take 1 Tab by mouth two (2) times a day. 12/3/18   Maura Sheriff MD  
clopidogrel (PLAVIX) 75 mg tab Take 1 Tab by mouth daily. 12/3/18   Maura Sheriff MD  
buPROPion XL (WELLBUTRIN XL) 150 mg tablet Take 1 Tab by mouth every morning. 9/26/18   Maura Sheriff MD  
ondansetron (ZOFRAN ODT) 4 mg disintegrating tablet Take 1 Tab by mouth every eight (8) hours as needed for Nausea. 2/5/18   Maura Sheriff MD  
diclofenac (VOLTAREN) 1 % gel Apply 4 g to affected area four (4) times daily. 9/25/17   Maura Sheriff MD  
prednisoLONE acetate (PRED FORTE) 1 % ophthalmic suspension Administer 1 Drop to both eyes four (4) times daily. Provider, Historical  
isosorbide mononitrate ER (IMDUR) 60 mg CR tablet Take  by mouth every morning. Provider, Historical  
aspirin (ASPIRIN) 325 mg tablet Take 1 Tab by mouth daily. 12/1/09   Rain Kellogg MD  
 
Current Facility-Administered Medications Medication Dose Route Frequency  0.9% sodium chloride infusion  75 mL/hr IntraVENous CONTINUOUS  
  buPROPion SR (WELLBUTRIN SR) tablet 150 mg  150 mg Oral DAILY  citalopram (CELEXA) tablet 20 mg  20 mg Oral DAILY  isosorbide mononitrate ER (IMDUR) tablet 60 mg  60 mg Oral DAILY  levothyroxine (SYNTHROID) tablet 100 mcg  100 mcg Oral ACB  metoprolol tartrate (LOPRESSOR) tablet 50 mg  50 mg Oral BID  pantoprazole (PROTONIX) tablet 40 mg  40 mg Oral ACB  prednisoLONE acetate (PRED FORTE) 1 % ophthalmic suspension 1 Drop  1 Drop Both Eyes QID  sodium chloride (NS) flush 5-40 mL  5-40 mL IntraVENous Q8H  
 sodium chloride (NS) flush 5-40 mL  5-40 mL IntraVENous PRN  
 acetaminophen (TYLENOL) tablet 650 mg  650 mg Oral Q8H  
 oxyCODONE IR (ROXICODONE) tablet 5 mg  5 mg Oral Q4H PRN  
 HYDROmorphone (PF) (DILAUDID) injection 0.5 mg  0.5 mg IntraVENous Q4H PRN  
 ondansetron (ZOFRAN) injection 4 mg  4 mg IntraVENous Q4H PRN  
 tuberculin injection 5 Units  5 Units IntraDERMal ONCE  
 insulin lispro (HUMALOG) injection   SubCUTAneous AC&HS  
 dextrose 40% (GLUTOSE) oral gel 1 Tube  15 g Oral PRN  
 glucagon (GLUCAGEN) injection 1 mg  1 mg IntraMUSCular PRN  
 dextrose (D50W) injection syrg 12.5-25 g  25-50 mL IntraVENous PRN Allergies Allergen Reactions  Ace Inhibitors Cough  Aricept [Donepezil] Diarrhea  Dextrose Unknown (comments)  Gatifloxacin Other (comments) HYPERTENSION  
 Levaquin [Levofloxacin] Hives and Other (comments) Causes blood sugar to \"bottom out\"  Macrodantin [Nitrofurantoin Macrocrystalline] Diarrhea  Morphine Other (comments) Urinary retention  Namenda [Memantine] Nausea Only  Carmencita Hives All Carmencita class of drugs  Sulfa (Sulfonamide Antibiotics) Unknown (comments) Review of Systems:  A comprehensive review of systems was negative. Mental Status: medium dementia Objective:  
 
Patient Vitals for the past 8 hrs: 
 BP Temp Pulse Resp SpO2 Height Weight 11/25/19 0738     95 %   19 0725 138/56 97.8 °F (36.6 °C) 77 17 91 %    
19 0627 147/73 98 °F (36.7 °C) 76 18 93 %    
19 0539 147/65 98.3 °F (36.8 °C) 73 17 96 %    
19 0520 141/65  73 18 98 %    
19 0458 159/67  72 20 98 %    
19 0315 199/81 98.3 °F (36.8 °C) 70 16 93 % 5' 3\" (1.6 m) 58.1 kg (128 lb) Temp (24hrs), Av.1 °F (36.7 °C), Min:97.8 °F (36.6 °C), Max:98.3 °F (36.8 °C) EXAM: General:          Well nourished. Alert. Eyes:               Normal sclera. Extraocular movements intact. ENT:                Normocephalic, atraumatic. Moist mucous membranes CV:                  RRR. No murmur, rub, or gallop. Lungs:             Clear to auscultation bilaterally. No wheezing, rhonchi, or rales. Abdomen:        Soft, nontender, nondistended. Extremities:     Warm and dry. No cyanosis or edema. RLE externally rotated Severe pain with motion. Good pulse and sensation Neurologic:      CN II-XII grossly intact. Sensation intact. Skin:                No rashes or jaundice. No wounds. Psych               Demented Imaging Review: markedly displaced femoral neck Labs:  
Recent Results (from the past 24 hour(s)) EKG, 12 LEAD, INITIAL Collection Time: 19  4:23 AM  
Result Value Ref Range Ventricular Rate 76 BPM  
 Atrial Rate 76 BPM  
 P-R Interval 160 ms QRS Duration 82 ms Q-T Interval 386 ms QTC Calculation (Bezet) 434 ms Calculated P Axis 76 degrees Calculated R Axis -32 degrees Calculated T Axis 57 degrees Diagnosis    
  !! AGE AND GENDER SPECIFIC ECG ANALYSIS !! Normal sinus rhythm Left axis deviation Nonspecific ST depression When compared with ECG of 01-OCT-2019 16:35, No significant change was found Confirmed by Flaquita Reyes MD (), AURELIA CHRISTIE ([id number]) on 2019 7:31:44 AM 
  
CBC W/O DIFF Collection Time: 19  4:26 AM  
Result Value Ref Range WBC 10.1 4.3 - 11.1 K/uL  
 RBC 4.32 4.05 - 5.2 M/uL HGB 13.1 11.7 - 15.4 g/dL HCT 40.0 35.8 - 46.3 % MCV 92.6 79.6 - 97.8 FL  
 MCH 30.3 26.1 - 32.9 PG  
 MCHC 32.8 31.4 - 35.0 g/dL  
 RDW 12.4 11.9 - 14.6 % PLATELET 742 660 - 298 K/uL MPV 11.0 9.4 - 12.3 FL ABSOLUTE NRBC 0.00 0.0 - 0.2 K/uL PROTHROMBIN TIME + INR Collection Time: 11/25/19  4:26 AM  
Result Value Ref Range Prothrombin time 13.0 11.7 - 14.5 sec INR 1.0    
URINALYSIS W/ RFLX MICROSCOPIC Collection Time: 11/25/19  4:57 AM  
Result Value Ref Range Color YELLOW Appearance CLEAR Specific gravity 1.010 1.001 - 1.023    
 pH (UA) 6.5 5.0 - 9.0 Protein NEGATIVE  NEG mg/dL Glucose NEGATIVE  mg/dL Ketone NEGATIVE  NEG mg/dL Bilirubin NEGATIVE  NEG Blood MODERATE (A) NEG Urobilinogen 0.2 0.2 - 1.0 EU/dL Nitrites NEGATIVE  NEG Leukocyte Esterase NEGATIVE  NEG    
 WBC 0-3 0 /hpf  
 RBC 5-10 0 /hpf Epithelial cells 0-3 0 /hpf Bacteria 0 0 /hpf Casts 0-3 0 /lpf METABOLIC PANEL, COMPREHENSIVE Collection Time: 11/25/19  7:17 AM  
Result Value Ref Range Sodium 141 136 - 145 mmol/L Potassium 4.3 3.5 - 5.1 mmol/L Chloride 108 (H) 98 - 107 mmol/L  
 CO2 26 21 - 32 mmol/L Anion gap 7 7 - 16 mmol/L Glucose 190 (H) 65 - 100 mg/dL BUN 23 8 - 23 MG/DL Creatinine 1.14 (H) 0.6 - 1.0 MG/DL  
 GFR est AA 59 (L) >60 ml/min/1.73m2 GFR est non-AA 49 (L) >60 ml/min/1.73m2 Calcium 9.3 8.3 - 10.4 MG/DL Bilirubin, total 0.6 0.2 - 1.1 MG/DL  
 ALT (SGPT) 21 12 - 65 U/L  
 AST (SGOT) 11 (L) 15 - 37 U/L Alk. phosphatase 84 50 - 136 U/L Protein, total 6.4 6.3 - 8.2 g/dL Albumin 3.5 3.2 - 4.6 g/dL Globulin 2.9 2.3 - 3.5 g/dL A-G Ratio 1.2 1.2 - 3.5 GLUCOSE, POC Collection Time: 11/25/19  7:23 AM  
Result Value Ref Range Glucose (POC) 223 (H) 65 - 100 mg/dL Impression:  
 
Principal Problem: 
  Closed fracture of neck of right femur ([facility] (11/25/2019) Active Problems: CAD (coronary artery disease) (8/27/2009) Essential hypertension (8/27/2009) Retinopathy due to secondary diabetes ([facility] (8/27/2009) Hypothyroidism, adult (9/23/2015) History of stroke (10/19/2016) Late onset Alzheimer's disease without behavioral disturbance ([facility] (7/12/2017) Type 2 diabetes with nephropathy ([facility] (3/13/2018) [facility] fracture requiring operative repair [facility] (11/25/2019) Plan:  
Bipolar arthroplasty procedure explained to family and pt. They agree. Complication and risks understood by family Elda Leo, DO

## 2019-11-25 NOTE — ED NOTES
TRANSFER - OUT REPORT: 
 
Verbal report given to Jyotsna Khan RN (name) on [address]  being transferred to 7th floor (unit) for routine progression of care Report consisted of patients Situation, Background, Assessment and  
Recommendations(SBAR). Information from the following report(s) SBAR, Kardex, ED Summary, [facility] and Recent Results was reviewed with the receiving nurse. Lines:  
Peripheral IV 11/25/19 Left Antecubital (Active) Site Assessment Clean, dry, & intact 11/25/2019  4:27 AM  
Phlebitis Assessment 0 11/25/2019  4:27 AM  
Infiltration Assessment 0 11/25/2019  4:27 AM  
Dressing Status Clean, dry, & intact 11/25/2019  4:27 AM  
  
 
Opportunity for questions and clarification was provided. Patient transported with: 
 [employer]

## 2019-11-25 NOTE — ED TRIAGE NOTES
Patient arrives via EMS from [facility] assisted living after fall tonight. Patient was sitting up upon EMS arrival. Patient with right hip pain. Patient ambulatory after fall, but states she had a lot of pain in her hip. /80, HR 72, RR 16, SpO2 94% with EMS. Patient alert and oriented upon arrival. Requests something for pain but states she is allergic to morphine. No shortening or rotation.

## 2019-11-25 NOTE — H&P
Hospitalist Note Admit Date:  2019  3:09 AM  
Name:  Beena Rojas Age:  78 y.o. 
:  1940 MRN:  [id number] PCP:  Yumi Stroud MD 
Treatment Team: Attending Provider: Isa Salgado MD; Primary Nurse: Sharyle Ice, RN 
 
HPI/Subjective:  
Pt is an 79 y/o F with dementia, [facility] resident, CAD/CABG x3, hx CVA, DM, who presented to ER after fall at home. She was getting up around 2am to adjust the heat when she fell on her R side, landed on her R hip with severe sharp pain following. No head trauma/LOC. Unable to bear weight. No preceding symptoms. She managed to get to her door and open it; staff desk is right across from her room and she was brought to ER 
 
10 systems reviewed and negative except as noted in HPI. Past Medical History:  
Diagnosis Date  Alzheimer's dementia ([address]  Anxiety state, unspecified  CAD (coronary artery disease)  Diabetes ([address] 2009  GERD (gastroesophageal reflux disease)  History of snoring 2016  History of stroke 10/19/2016  Hypertension  Hypothyroid 2009  Late onset Alzheimer's disease without behavioral disturbance ([address] 2012  Mild dementia ([address] 2016  Mixed hyperlipidemia  Osteopenia  Reactive depression 2016  Retinopathy due to secondary diabetes ([address] 2009  Sensory polyneuropathy 2016 Past Surgical History:  
Procedure Laterality Date  HX ANGIOPLASTY   STENTING-post CABG  
 HX CHOLECYSTECTOMY   LAPAROSCOPIC  
 HX CORONARY ARTERY BYPASS GRAFT    
 triple dtlmiq-[id number]-xqitiqui thereafter  HX HEENT    
 right eyex  2-laser surgery?  HX KNEE ARTHROSCOPY Right  HX OTHER SURGICAL    
 cataract extraction and iol implant  HX OTHER SURGICAL Carotid stent 3  
 HX THYROIDECTOMY  HX TONSILLECTOMY  as child  HX TOTAL VAGINAL HYSTERECTOMY    
 hysterectomy  HX UROLOGICAL    
 bladder tack Allergies Allergen Reactions  Ace Inhibitors Cough  Aricept [Donepezil] Diarrhea  Dextrose Unknown (comments)  Gatifloxacin Other (comments) HYPERTENSION  
 Levaquin [Levofloxacin] Hives and Other (comments) Causes blood sugar to \"bottom out\"  Macrodantin [Nitrofurantoin Macrocrystalline] Diarrhea  Morphine Other (comments) Urinary retention  Namenda [Memantine] Nausea Only  Carmencita Hives All Carmencita class of drugs  Sulfa (Sulfonamide Antibiotics) Unknown (comments) Social History Tobacco Use  Smoking status: Never Smoker  Smokeless tobacco: Never Used Substance Use Topics  Alcohol use: No  
  
Family History Problem Relation Age of Onset  Heart Disease Mother CHF  Lung Disease Mother  COPD Mother  Heart Disease Father CAD  Cancer Father LUNG  
 Heart Disease Maternal Grandfather  Diabetes Maternal Grandfather  Heart Disease Paternal Grandfather  Breast Cancer Child 48  Hypertension Sister  Other Sister TACHYCARDIA  Diabetes Maternal Grandmother  Arthritis-osteo Sister  Malignant Hyperthermia Neg Hx  Pseudocholinesterase Deficiency Neg Hx  Delayed Awakening Neg Hx  Post-op Nausea/Vomiting Neg Hx  Emergence Delirium Neg Hx  Post-op Cognitive Dysfunction Neg Hx Immunization History Administered Date(s) Administered  Influenza High Dose Vaccine PF 10/12/2016, 09/20/2017, 09/26/2018  Influenza Vaccine 10/08/2012, 09/23/2015  Pneumococcal Conjugate (PCV-13) 09/23/2015  Pneumococcal Vaccine (Unspecified Type) 09/28/2009  Tdap 05/21/2010 PTA Medications: 
Prior to Admission Medications Prescriptions Last Dose Informant Patient Reported? Taking?  
aspirin (ASPIRIN) 325 mg tablet   No No  
Sig: Take 1 Tab by mouth daily. buPROPion XL (WELLBUTRIN XL) 150 mg tablet   No No  
Sig: Take 1 Tab by mouth every morning. citalopram (CELEXA) 20 mg tablet   No No  
Sig: Take 1 Tab by mouth daily. clopidogrel (PLAVIX) 75 mg tab   No No  
Sig: Take 1 Tab by mouth daily. diclofenac (VOLTAREN) 1 % gel   No No  
Sig: Apply 4 g to affected area four (4) times daily. galantamine (RAZADYNE) 4 mg tablet   No No  
Sig: TAKE 1 TAB BY MOUTH TWO (2) TIMES A DAY. isosorbide mononitrate ER (IMDUR) 60 mg CR tablet   Yes No  
Sig: Take  by mouth every morning. levothyroxine (SYNTHROID) 100 mcg tablet   No No  
Sig: Take 1 Tab by mouth Daily (before breakfast). metFORMIN ER (GLUCOPHAGE XR) 500 mg tablet   No No  
Sig: Take 1 Tab by mouth two (2) times a day. metoprolol tartrate (LOPRESSOR) 50 mg tablet   No No  
Sig: Take 1 Tab by mouth two (2) times a day. omeprazole (PRILOSEC) 20 mg capsule   No No  
Sig: Take 1 Cap by mouth daily. ondansetron (ZOFRAN ODT) 4 mg disintegrating tablet   No No  
Sig: Take 1 Tab by mouth every eight (8) hours as needed for Nausea. prednisoLONE acetate (PRED FORTE) 1 % ophthalmic suspension   Yes No  
Sig: Administer 1 Drop to both eyes four (4) times daily. simvastatin (ZOCOR) 40 mg tablet   No No  
Sig: Take 1 Tab by mouth nightly. Facility-Administered Medications: None Objective:  
 
Patient Vitals for the past 24 hrs: 
 Temp Pulse Resp BP SpO2  
11/25/19 0458  72 20 159/67 98 % 11/25/19 0315 98.3 °F (36.8 °C) 70 16 199/81 93 % Oxygen Therapy O2 Sat (%): 98 % (11/25/19 0458) Pulse via Oximetry: 73 beats per minute (11/25/19 0458) O2 Device: Room air (11/25/19 0331) No intake or output data in the 24 hours ending 11/25/19 4796 Physical Exam: 
General:    Well nourished. Alert. Eyes:   Normal sclera. Extraocular movements intact. ENT:  Normocephalic, atraumatic. Moist mucous membranes CV:   RRR. No murmur, rub, or gallop. Lungs:  Clear to auscultation bilaterally. No wheezing, rhonchi, or rales. Abdomen: Soft, nontender, nondistended. Extremities: Warm and dry. No cyanosis or edema. RLE externally rotated Neurologic: CN II-XII grossly intact. Sensation intact. Skin:     No rashes or jaundice. No wounds. Psych:  Normal mood and affect. I reviewed the labs, imaging, EKGs, telemetry, and other studies done this admission. Data Review:  
Recent Results (from the past 24 hour(s)) EKG, 12 LEAD, INITIAL Collection Time: 11/25/19  4:23 AM  
Result Value Ref Range Ventricular Rate 76 BPM  
 Atrial Rate 76 BPM  
 P-R Interval 160 ms QRS Duration 82 ms Q-T Interval 386 ms QTC Calculation (Bezet) 434 ms Calculated P Axis 76 degrees Calculated R Axis -32 degrees Calculated T Axis 57 degrees Diagnosis    
  !! AGE AND GENDER SPECIFIC ECG ANALYSIS !! Normal sinus rhythm Left axis deviation Abnormal ECG When compared with ECG of 01-OCT-2019 16:35, No significant change was found CBC W/O DIFF Collection Time: 11/25/19  4:26 AM  
Result Value Ref Range WBC 10.1 4.3 - 11.1 K/uL  
 RBC 4.32 4.05 - 5.2 M/uL  
 HGB 13.1 11.7 - 15.4 g/dL HCT 40.0 35.8 - 46.3 % MCV 92.6 79.6 - 97.8 FL  
 MCH 30.3 26.1 - 32.9 PG  
 MCHC 32.8 31.4 - 35.0 g/dL  
 RDW 12.4 11.9 - 14.6 % PLATELET 613 613 - 503 K/uL MPV 11.0 9.4 - 12.3 FL ABSOLUTE NRBC 0.00 0.0 - 0.2 K/uL PROTHROMBIN TIME + INR Collection Time: 11/25/19  4:26 AM  
Result Value Ref Range Prothrombin time 13.0 11.7 - 14.5 sec INR 1.0 All Micro Results None Current Facility-Administered Medications Medication Dose Route Frequency  0.9% sodium chloride infusion  150 mL/hr IntraVENous CONTINUOUS Current Outpatient Medications Medication Sig  
 galantamine (RAZADYNE) 4 mg tablet TAKE 1 TAB BY MOUTH TWO (2) TIMES A DAY.  levothyroxine (SYNTHROID) 100 mcg tablet Take 1 Tab by mouth Daily (before breakfast).  metFORMIN ER (GLUCOPHAGE XR) 500 mg tablet Take 1 Tab by mouth two (2) times a day.  citalopram (CELEXA) 20 mg tablet Take 1 Tab by mouth daily.  simvastatin (ZOCOR) 40 mg tablet Take 1 Tab by mouth nightly.  omeprazole (PRILOSEC) 20 mg capsule Take 1 Cap by mouth daily.  metoprolol tartrate (LOPRESSOR) 50 mg tablet Take 1 Tab by mouth two (2) times a day.  clopidogrel (PLAVIX) 75 mg tab Take 1 Tab by mouth daily.  buPROPion XL (WELLBUTRIN XL) 150 mg tablet Take 1 Tab by mouth every morning.  ondansetron (ZOFRAN ODT) 4 mg disintegrating tablet Take 1 Tab by mouth every eight (8) hours as needed for Nausea.  diclofenac (VOLTAREN) 1 % gel Apply 4 g to affected area four (4) times daily.  prednisoLONE acetate (PRED FORTE) 1 % ophthalmic suspension Administer 1 Drop to both eyes four (4) times daily.  isosorbide mononitrate ER (IMDUR) 60 mg CR tablet Take  by mouth every morning.  aspirin (ASPIRIN) 325 mg tablet Take 1 Tab by mouth daily. Other Studies: Xr Chest Sngl V Result Date: 11/25/2019 EXAM: Chest x-ray. INDICATION: Preoperative evaluation. COMPARISON: October 1, 2019. TECHNIQUE: Single frontal view. FINDINGS: The lungs are clear. The cardiac size, mediastinal contour and pulmonary vasculature are normal. No pneumothorax or pleural effusion is seen. Again noted are sternotomy wires. IMPRESSION: No acute process. Xr Hip Rt W Or Wo Pelv 2-3 Vws Result Date: 11/25/2019 EXAM: Pelvis and right hip x-rays. INDICATION: Pain after falling. COMPARISON: None. TECHNIQUE: A frontal x-ray of the pelvis was supplemented with 2 dedicated views of the right hip joint. FINDINGS: There is a mildly displaced right femoral neck fracture. No other fractures are identified. The pelvic ring is intact. IMPRESSION: Right femoral neck fracture. Xr Femur Rt 2 Vs 
 
Result Date: 11/25/2019 EXAM: Right femur x-rays. INDICATION: Pain after falling. COMPARISON: Subsequent dedicated right hip x-rays. TECHNIQUE: 4 views.  FINDINGS: As noted on the right hip x-ray report, there is a right femoral neck fracture. No fractures are seen in the more distal right femur. IMPRESSION: As above. Assessment and Plan:  
 
Hospital Problems as of 11/25/2019 Date Reviewed: 4/20/2017 Codes Class Noted - Resolved POA Hip fracture requiring operative repair [facility]) ICD-10-CM: J14.695E ICD-9-CM: 820.8  11/25/2019 - Present Yes * (Principal) Closed fracture of neck of right femur ([facility] ICD-10-CM: S72.001A ICD-9-CM: 820.8  11/25/2019 - Present Yes Type 2 diabetes with nephropathy (HCC) (Chronic) ICD-10-CM: E11.21 
ICD-9-CM: 250.40, 583.81  3/13/2018 - Present Yes Late onset Alzheimer's disease without behavioral disturbance (HCC) (Chronic) ICD-10-CM: G30.1, F02.80 ICD-9-CM: 331.0, 294.10  7/12/2017 - Present Yes History of stroke (Chronic) ICD-10-CM: Q71.64 
ICD-9-CM: V12.54  10/19/2016 - Present Yes Hypothyroidism, adult (Chronic) ICD-10-CM: E03.9 ICD-9-CM: 244.9  9/23/2015 - Present Yes CAD (coronary artery disease) (Chronic) ICD-10-CM: I25.10 ICD-9-CM: 414.00  8/27/2009 - Present Yes Essential hypertension (Chronic) ICD-10-CM: I10 
ICD-9-CM: 401.9  8/27/2009 - Present Yes Retinopathy due to secondary diabetes ([facility] (Chronic) ICD-10-CM: U97.213 ICD-9-CM: 249.50, 362.01  8/27/2009 - Present Yes Plan: 
Hip Fracture: 
-Admit to ortho unit, inpatient 
-Ortho consulted. Perioperative cardiac risk assessment: moderate. No absolute contraindications to surgery at this time. 
-Pain control with scheduled tylenol, PRN IV dilaudid and PO oxycodone from hip fracture order set 
-Diet NPO 
-holding home ASA and plavix so did not get this morning Other listed chronic conditions stable, cont home meds Discharge planning:  Ppd ordered. CM consulted. PT/OT evals after surgery DVT ppx:  Per surgery Code status:  Full Estimated LOS:  Greater than 2 midnights Risk:  high Signed: 
Giorgi Gill MD

## 2019-11-25 NOTE — PROGRESS NOTES
TRANSFER - OUT REPORT: 
 
Verbal report given to [facility]) on [address]  being transferred to Preop(unit) for routine progression of care Report consisted of patients Situation, Background, Assessment and  
Recommendations(SBAR). Information from the following report(s) SBAR, Intake/Output, MAR and Recent Results was reviewed with the receiving nurse. Lines:  
Peripheral IV 11/25/19 Left Antecubital (Active) Site Assessment Clean, dry, & intact 11/25/2019  7:45 AM  
Phlebitis Assessment 0 11/25/2019  7:45 AM  
Infiltration Assessment 0 11/25/2019  7:45 AM  
Dressing Status Clean, dry, & intact 11/25/2019  7:45 AM  
Dressing Type Tape;Transparent 11/25/2019  7:45 AM  
Hub Color/Line Status Flushed 11/25/2019  7:45 AM  
Action Taken Open ports on tubing capped 11/25/2019  6:20 AM  
Alcohol Cap Used No 11/25/2019  6:20 AM  
  
 
Opportunity for questions and clarification was provided.

## 2019-11-25 NOTE — PROGRESS NOTES
11/25/19 5989 Dual Skin Pressure Injury Assessment Dual Skin Pressure Injury Assessment X Second Care Provider (Based on [address] [place] [address]) Tj Ruiz RN Skin Integumentary Skin Integumentary (WDL) X Skin Color Ecchymosis (comment) (Scattered. ) Skin Condition/Temp Dry;Fragile; Warm  
Skin Integrity Scars (comment) (Scattered. ) Turgor Non-tenting Hair Growth Present Varicosities Absent Wound Prevention and Protection Methods Orientation of Wound Prevention Posterior Location of Wound Prevention Sacrum/Coccyx Wound Offloading (Prevention Methods) Bed, pressure reduction mattress;Pillows;Repositioning Sacral area intact. Allevyn applied for prevention. Dry, thick, discolored toenails.

## 2019-11-25 NOTE — PERIOP NOTES
TRANSFER - IN REPORT: 
 
Verbal report received from [facility] on [address]  being received from 7th floor for ordered procedure Report consisted of patients Situation, Background, Assessment and  
Recommendations(SBAR). Information from the following report(s) SBAR and MAR was reviewed with the receiving nurse. Opportunity for questions and clarification was provided. Assessment will be completed upon patients arrival to unit and care assumed.

## 2019-11-26 LAB
ANION GAP SERPL CALC-SCNC: 6 MMOL/L (ref 7–16)
BASOPHILS # BLD: 0 K/UL (ref 0–0.2)
BASOPHILS NFR BLD: 0 % (ref 0–2)
BUN SERPL-MCNC: 21 MG/DL (ref 8–23)
CALCIUM SERPL-MCNC: 8.7 MG/DL (ref 8.3–10.4)
CHLORIDE SERPL-SCNC: 105 MMOL/L (ref 98–107)
CO2 SERPL-SCNC: 27 MMOL/L (ref 21–32)
CREAT SERPL-MCNC: 1.11 MG/DL (ref 0.6–1)
DIFFERENTIAL METHOD BLD: ABNORMAL
EOSINOPHIL # BLD: 0.2 K/UL (ref 0–0.8)
EOSINOPHIL NFR BLD: 2 % (ref 0.5–7.8)
ERYTHROCYTE [DISTWIDTH] IN BLOOD BY AUTOMATED COUNT: 12.3 % (ref 11.9–14.6)
GLUCOSE BLD STRIP.AUTO-MCNC: 109 MG/DL (ref 65–100)
GLUCOSE BLD STRIP.AUTO-MCNC: 158 MG/DL (ref 65–100)
GLUCOSE BLD STRIP.AUTO-MCNC: 168 MG/DL (ref 65–100)
GLUCOSE BLD STRIP.AUTO-MCNC: 202 MG/DL (ref 65–100)
GLUCOSE BLD STRIP.AUTO-MCNC: 346 MG/DL (ref 65–100)
GLUCOSE SERPL-MCNC: 152 MG/DL (ref 65–100)
HCT VFR BLD AUTO: 33.7 % (ref 35.8–46.3)
HGB BLD-MCNC: 10.6 G/DL (ref 11.7–15.4)
IMM GRANULOCYTES # BLD AUTO: 0 K/UL (ref 0–0.5)
IMM GRANULOCYTES NFR BLD AUTO: 0 % (ref 0–5)
LYMPHOCYTES # BLD: 0.7 K/UL (ref 0.5–4.6)
LYMPHOCYTES NFR BLD: 6 % (ref 13–44)
MCH RBC QN AUTO: 30.2 PG (ref 26.1–32.9)
MCHC RBC AUTO-ENTMCNC: 31.5 G/DL (ref 31.4–35)
MCV RBC AUTO: 96 FL (ref 79.6–97.8)
MM INDURATION POC: 0 MM (ref 0–5)
MONOCYTES # BLD: 1.7 K/UL (ref 0.1–1.3)
MONOCYTES NFR BLD: 15 % (ref 4–12)
NEUTS SEG # BLD: 8.5 K/UL (ref 1.7–8.2)
NEUTS SEG NFR BLD: 77 % (ref 43–78)
NRBC # BLD: 0 K/UL (ref 0–0.2)
PLATELET # BLD AUTO: 175 K/UL (ref 150–450)
PMV BLD AUTO: 11 FL (ref 9.4–12.3)
POTASSIUM SERPL-SCNC: 3.8 MMOL/L (ref 3.5–5.1)
PPD POC: NEGATIVE NEGATIVE
RBC # BLD AUTO: 3.51 M/UL (ref 4.05–5.2)
SODIUM SERPL-SCNC: 138 MMOL/L (ref 136–145)
WBC # BLD AUTO: 11.1 K/UL (ref 4.3–11.1)

## 2019-11-26 PROCEDURE — 74011250637 HC RX REV CODE- 250/637: Performed by: INTERNAL MEDICINE

## 2019-11-26 PROCEDURE — 74011000258 HC RX REV CODE- 258: Performed by: ORTHOPAEDIC SURGERY

## 2019-11-26 PROCEDURE — 97165 OT EVAL LOW COMPLEX 30 MIN: CPT

## 2019-11-26 PROCEDURE — 97530 THERAPEUTIC ACTIVITIES: CPT

## 2019-11-26 PROCEDURE — 36415 COLL VENOUS BLD VENIPUNCTURE: CPT

## 2019-11-26 PROCEDURE — 65270000029 HC RM PRIVATE

## 2019-11-26 PROCEDURE — 82962 GLUCOSE BLOOD TEST: CPT

## 2019-11-26 PROCEDURE — 85025 COMPLETE CBC W/AUTO DIFF WBC: CPT

## 2019-11-26 PROCEDURE — 80048 BASIC METABOLIC PNL TOTAL CA: CPT

## 2019-11-26 PROCEDURE — 97110 THERAPEUTIC EXERCISES: CPT

## 2019-11-26 PROCEDURE — 97112 NEUROMUSCULAR REEDUCATION: CPT

## 2019-11-26 PROCEDURE — 74011636637 HC RX REV CODE- 636/637: Performed by: INTERNAL MEDICINE

## 2019-11-26 PROCEDURE — 74011250637 HC RX REV CODE- 250/637: Performed by: ORTHOPAEDIC SURGERY

## 2019-11-26 PROCEDURE — 74011250636 HC RX REV CODE- 250/636: Performed by: ORTHOPAEDIC SURGERY

## 2019-11-26 PROCEDURE — 99221 1ST HOSP IP/OBS SF/LOW 40: CPT | Performed by: PHYSICAL MEDICINE & REHABILITATION

## 2019-11-26 PROCEDURE — 97161 PT EVAL LOW COMPLEX 20 MIN: CPT

## 2019-11-26 PROCEDURE — 51798 US URINE CAPACITY MEASURE: CPT

## 2019-11-26 RX ORDER — AMOXICILLIN 250 MG
1 CAPSULE ORAL DAILY
Status: DISCONTINUED | OUTPATIENT
Start: 2019-11-27 | End: 2019-11-27

## 2019-11-26 RX ADMIN — LEVOTHYROXINE SODIUM 100 MCG: 100 TABLET ORAL at 05:02

## 2019-11-26 RX ADMIN — Medication 10 ML: at 15:28

## 2019-11-26 RX ADMIN — METOPROLOL TARTRATE 50 MG: 50 TABLET, FILM COATED ORAL at 08:00

## 2019-11-26 RX ADMIN — PANTOPRAZOLE SODIUM 40 MG: 40 TABLET, DELAYED RELEASE ORAL at 05:02

## 2019-11-26 RX ADMIN — OXYCODONE HYDROCHLORIDE 5 MG: 5 TABLET ORAL at 05:02

## 2019-11-26 RX ADMIN — ENOXAPARIN SODIUM 40 MG: 40 INJECTION SUBCUTANEOUS at 08:02

## 2019-11-26 RX ADMIN — INSULIN LISPRO 2 UNITS: 100 INJECTION, SOLUTION INTRAVENOUS; SUBCUTANEOUS at 11:54

## 2019-11-26 RX ADMIN — OXYCODONE HYDROCHLORIDE 5 MG: 5 TABLET ORAL at 12:10

## 2019-11-26 RX ADMIN — SODIUM CHLORIDE 1000 MG: 900 INJECTION, SOLUTION INTRAVENOUS at 08:02

## 2019-11-26 RX ADMIN — CALCIUM CARBONATE 500 MG (1,250 MG)-VITAMIN D3 200 UNIT TABLET 1 TABLET: at 08:00

## 2019-11-26 RX ADMIN — Medication 5 ML: at 05:03

## 2019-11-26 RX ADMIN — CITALOPRAM HYDROBROMIDE 20 MG: 20 TABLET ORAL at 08:02

## 2019-11-26 RX ADMIN — CALCIUM CARBONATE 500 MG (1,250 MG)-VITAMIN D3 200 UNIT TABLET 1 TABLET: at 17:24

## 2019-11-26 RX ADMIN — INSULIN LISPRO 4 UNITS: 100 INJECTION, SOLUTION INTRAVENOUS; SUBCUTANEOUS at 17:24

## 2019-11-26 RX ADMIN — CALCIUM CARBONATE 500 MG (1,250 MG)-VITAMIN D3 200 UNIT TABLET 1 TABLET: at 11:53

## 2019-11-26 RX ADMIN — ISOSORBIDE MONONITRATE 60 MG: 30 TABLET, EXTENDED RELEASE ORAL at 08:00

## 2019-11-26 RX ADMIN — SODIUM CHLORIDE 1000 MG: 900 INJECTION, SOLUTION INTRAVENOUS at 17:24

## 2019-11-26 RX ADMIN — SODIUM CHLORIDE 1000 MG: 900 INJECTION, SOLUTION INTRAVENOUS at 00:40

## 2019-11-26 RX ADMIN — INSULIN LISPRO 2 UNITS: 100 INJECTION, SOLUTION INTRAVENOUS; SUBCUTANEOUS at 07:59

## 2019-11-26 RX ADMIN — BUPROPION HYDROCHLORIDE 150 MG: 150 TABLET, EXTENDED RELEASE ORAL at 08:00

## 2019-11-26 RX ADMIN — METOPROLOL TARTRATE 50 MG: 50 TABLET, FILM COATED ORAL at 17:24

## 2019-11-26 NOTE — ANESTHESIA POSTPROCEDURE EVALUATION
Procedure(s): RIGHT HIP HEMIARTHROPLASTY. general 
 
Anesthesia Post Evaluation Multimodal analgesia: multimodal analgesia used between 6 hours prior to anesthesia start to PACU discharge Patient location during evaluation: PACU Patient participation: complete - patient participated Level of consciousness: awake and alert Pain management: adequate Airway patency: patent Anesthetic complications: no 
Cardiovascular status: acceptable Respiratory status: acceptable Hydration status: acceptable Post anesthesia nausea and vomiting:  none Vitals Value Taken Time /60 11/25/2019  6:46 PM  
Temp 37.1 °C (98.7 °F) 11/25/2019  6:57 PM  
Pulse 79 11/25/2019  6:58 PM  
Resp 12 11/25/2019  6:58 PM  
SpO2 95 % 11/25/2019  6:58 PM  
Vitals shown include unvalidated device data.

## 2019-11-26 NOTE — INTERDISCIPLINARY ROUNDS
Interdisciplinary team rounds were held 11/26/2019 with the following team members:Care Management, Nursing, Physical Therapy and Physician and the patient. Family at bedside. 
  
Plan of care discussed. See clinical pathway and/or care plan for interventions and desired outcomes.

## 2019-11-26 NOTE — OP NOTES
[facility] 
OPERATIVE REPORT Name:  Ashli Rogers 
MR#:  [id number] :  1940 ACCOUNT #:  [de-identified] DATE OF SERVICE:  2019 PREOPERATIVE DIAGNOSIS:  Right hip femoral neck fracture. POSTOPERATIVE DIAGNOSIS:  Right hip femoral neck fracture. PROCEDURE PERFORMED:  Right hip hemiarthroplasty. SURGEON:  Kandis Montemayor MD 
 
ASSISTANT:  None ANESTHESIA:  General. 
 
COMPLICATIONS:  None. SPECIMENS REMOVED:  Femoral head for routine disposal. 
 
IMPLANTS:  DePuy Corail size 13 standard Press-Fit stem, +5 head, 47 bipolar shell. ESTIMATED BLOOD LOSS:  75 mL. PROCEDURE IN DETAIL:  After discussion of risks, benefits, and alternatives, the patient and her daughters expressed understanding and strongly desired to proceed. She was brought to the operating room. After verification of the patient's site, side, diagnosis, and procedure, general anesthesia was administered. She was placed in lateral position with the bony prominences well padded. The right hip was prepped and draped in normal sterile fashion. Time-out was then performed. Posterior approach to the hip was performed. Short external rotators and hip capsule were tagged and reflected. The hip fracture was identified. The native femoral head was removed. Provisional femoral neck cut was performed. The acetabulum was sized and excellent suction fit was obtained with a 47 trial.  Lateralized access to the femur was then obtained with a box osteotome and canal finding reamer. A lateralizing broach and serial broaches were then used. A size 13 demonstrated excellent stability within the canal.  Trialing demonstrated excellent stability of the hip with appropriate leg length with a standard offset neck and a +5 trial head. Trial implants were removed. The wound was copiously irrigated at length. The acetabulum was inspected.   A size 13 standard Press-Fit Corail stem was then impacted into place. Trialing was again performed and a +5 47 permanent bipolar shell construct was then selected and constructed. It was applied to the [facility] FOR CONTINUING [facility]. The hip was reduced and found to be very stable. The short external rotators and hip capsule were then repaired with posterior soft tissue repair. Copious irrigation was performed at length. A layered wound closure was performed. Sterile dressings were applied. Blood loss was 75 mL and no complications. The resected femoral head was sent for routine disposal. 
 
POSTOPERATIVE PLAN:  Weight bear as tolerated. Posterior hip precautions. Standard postoperative antibiotic and DVT prophylaxis. Haleigh Garcia MD 
 
 
[id number] 
D:  11/25/2019 17:55 
T:  11/26/2019 1:22 
JOB #:  [id number] CC:   Finesse Ramirez MD

## 2019-11-26 NOTE — PROGRESS NOTES
Problem: Falls - Risk of 
Goal: *Absence of Falls Description Document Reggie Heaton Fall Risk and appropriate interventions in the flowsheet. 11/26/2019 0726 by Albania Trujillo RN Outcome: Progressing Towards Goal 
Note: Fall Risk Interventions: 
Mobility Interventions: Bed/chair exit alarm Mentation Interventions: Bed/chair exit alarm Medication Interventions: Bed/chair exit alarm Elimination Interventions: Call light in reach History of Falls Interventions: Bed/chair exit alarm 11/26/2019 0725 by Albania Trujillo RN Outcome: Progressing Towards Goal 
Note: Fall Risk Interventions: 
Mobility Interventions: Bed/chair exit alarm Mentation Interventions: Bed/chair exit alarm Medication Interventions: Bed/chair exit alarm Elimination Interventions: Call light in reach History of Falls Interventions: Bed/chair exit alarm Problem: Patient Education: Go to Patient Education Activity Goal: Patient/Family Education 11/26/2019 0726 by Albania Trujillo RN Outcome: Progressing Towards Goal 
11/26/2019 0725 by Nata Redman RN Outcome: Progressing Towards Goal 
  
Problem: Pressure Injury - Risk of 
Goal: *Prevention of pressure injury Description Document Mack Scale and appropriate interventions in the flowsheet. 11/26/2019 0726 by Albania Trujillo RN Outcome: Progressing Towards Goal 
Note: Pressure Injury Interventions: 
Sensory Interventions: Assess changes in LOC Moisture Interventions: Absorbent underpads, Check for incontinence Q2 hours and as needed Activity Interventions: Pressure redistribution bed/mattress(bed type) Mobility Interventions: Pressure redistribution bed/mattress (bed type) Nutrition Interventions: Document food/fluid/supplement intake, Offer support with meals,snacks and hydration 11/26/2019 0725 by Albania Trujillo RN Outcome: Progressing Towards Goal 
Note: Pressure Injury Interventions: Sensory Interventions: Assess changes in LOC Moisture Interventions: Absorbent underpads, Check for incontinence Q2 hours and as needed Activity Interventions: Pressure redistribution bed/mattress(bed type) Mobility Interventions: Pressure redistribution bed/mattress (bed type) Nutrition Interventions: Document food/fluid/supplement intake, Offer support with meals,snacks and hydration Problem: Patient Education: Go to Patient Education Activity Goal: Patient/Family Education 11/26/2019 0726 by Renuka Trujillo RN Outcome: Progressing Towards Goal 
11/26/2019 0725 by Lore Rowe RN Outcome: Progressing Towards Goal 
  
Problem: Patient Education: Go to Patient Education Activity Goal: Patient/Family Education 11/26/2019 0726 by Renuka Trujillo RN Outcome: Progressing Towards Goal 
11/26/2019 0725 by Lore Rowe RN Outcome: Progressing Towards Goal 
  
Problem: Hip Fracture: Day of Surgery Post-op Care Goal: Activity/Safety Outcome: Progressing Towards Goal 
Goal: Consults, if ordered Outcome: Progressing Towards Goal 
Goal: Diagnostic Test/Procedures Outcome: Progressing Towards Goal 
Goal: Nutrition/Diet Outcome: Progressing Towards Goal 
Goal: Medications Outcome: Progressing Towards Goal 
Goal: Respiratory Outcome: Progressing Towards Goal 
Goal: Treatments/Interventions/Procedures Outcome: Progressing Towards Goal 
Goal: Psychosocial 
Outcome: Progressing Towards Goal 
Goal: *Absence of skin breakdown Outcome: Progressing Towards Goal 
Goal: *Optimal pain control at patient's stated goal 
Outcome: Progressing Towards Goal 
Goal: *Hemodynamically stable Outcome: Progressing Towards Goal

## 2019-11-26 NOTE — PROGRESS NOTES
Problem: Mobility Impaired (Adult and Pediatric) Goal: *Acute Goals and Plan of Care (Insert Text) Description ST. Patient will perform bed mobility with MINIMAL ASSISTANCE within 3 days. 2. Patient will transfer bed to chair with MINIMAL ASSISTANCE x2 within 3 days. 3. Patient will demonstrate GOOD DYNAMIC SITTING balance within 3 day(s). 4. Patient will ambulate 25+ using least restrictive assistive device and MINIMAL ASSISTANCE x2 within 3 days. 5. Patient will tolerate 15+ minutes of therapeutic activity/exercise and/or neuromuscular re-education while maintaining stable vitals to improve functional strength and activity tolerance within 3 days. LT. Patient will perform bed mobility with CONTACT GUARD ASSISTANCE within 7 days. 2. Patient will transfer bed to chair with MINIMAL ASSISTANCE x1 within 7 days. 3. Patient will demonstrate FAIR DYNAMIC STANDING balance within 7 day(s). 4. Patient will ambulate 50+ using least restrictive assistive device and MINIMAL ASSISTANCE within 7 days. 5. Patient will tolerate 25+ minutes of therapeutic activity/exercise and/or neuromuscular re-education while maintaining stable vitals to improve functional strength and activity tolerance within 7 days. Outcome: Progressing Towards Goal 
  
 
PHYSICAL THERAPY: Initial Assessment, Daily Note and PM 2019 INPATIENT: PT Visit Days : 1 Payor: Андрей Gross / [facility] [employer] [facility] PPO/PFFS / Product Type: Managed Care Medicare /   
 
WBAT R LE Hip Precautions NAME/AGE/GENDER: Kelsy Bhagat is a 78 y.o. female PRIMARY DIAGNOSIS: Hip fracture requiring operative repair [facility]) [S72.009A] Closed fracture of neck of right femur ([address] Closed fracture of neck of right femur ([address] Procedure(s) (LRB): 
RIGHT HIP HEMIARTHROPLASTY (Right) 1 Day Post-Op ICD-10: Treatment Diagnosis: · Difficulty in walking, Not elsewhere classified (R26.2) · History of falling (Z91.81) Precaution/Allergies: 
Ace inhibitors; Aricept [donepezil]; Dextrose; Gatifloxacin; Levaquin [levofloxacin]; Macrodantin [nitrofurantoin macrocrystalline]; Morphine; Namenda [memantine]; Carmencita; and Sulfa (sulfonamide antibiotics) ASSESSMENT:  
 
Ms. Roldan Spring is a 78year old female admitted with fall and 1 day s/p right hip hemiarthroplasty. Patient is WBAT with posterior hip precautions. Patient seen this AM for initial physical therapy evaluation: presents supine in bed, oriented to person only. and endorses no pain at rest and c/o pain with mobility. History provided by patient's daughter at bedside: Patient lives at an [facility] where she requires assistance with ADLs and ambulates without utilizing an assistive device. Patient with history of recent falls. Educated patient/daughter on role of acute PT, goals, plan of care, hip precautions, and KI indications/wear schedule. Today, expected post operative functional strength deficits appreciated in R LE, L LE AROM WFL and strength 4-/5, and sensation is intact to light touch distal B LE. Patient performed bed mobility with moderate assistance x2, transfers with minimal to moderate assistance x2, and ambulation x5ft bed to chair with RW. Patient required verbal, tactile, and manual cues for gait mechanics, RW negotiation, and dynamic standing balance. Ms. Roldan Spring presents with decreased functional mobility and balance/gait status from baseline. Recommend continued skilled PT services to address stated deficits. Will follow and progress toward stated goals during acute stay. PM treatment:  Patient is sitting in the recliner. Agreeable to therapy. Daughters and 2 visitors present. KI removed for exercises which the patient tolerated fairly, she did participate a little. Patient needed assist to scoot to the edge of the recliner and moderate assist to stand to the walker.   Patient was not able to follow all the directions completely and was having a lot of trouble moving her feet. Bed mobility is with moderate to max assist however the patient was able to  Bridge a little to help with repositioning. KI replaced. Patient is positioned for comfort with needs within reach and alarm intact. Patient has some confusion and does not always follow directions. Needs rehab. Will continue PT efforts. This section established at most recent assessment PROBLEM LIST (Impairments causing functional limitations): 1. Decreased Strength 2. Decreased ADL/Functional Activities 3. Decreased Transfer Abilities 4. Decreased Ambulation Ability/Technique 5. Decreased Balance 6. Increased Pain 7. Decreased Activity Tolerance 8. Decreased Flexibility/Joint Mobility 9. Decreased Knowledge of Precautions INTERVENTIONS PLANNED: (Benefits and precautions of physical therapy have been discussed with the patient.) 1. Balance Exercise 2. Bed Mobility 3. Family Education 4. Gait Training 5. Home Exercise Program (HEP) 6. Neuromuscular Re-education/Strengthening 7. Range of Motion (ROM) 8. Therapeutic Activites 9. Therapeutic Exercise/Strengthening 10. Transfer Training TREATMENT PLAN: Frequency/Duration: twice daily for duration of hospital stay Rehabilitation Potential For Stated Goals: Good REHAB RECOMMENDATIONS (at time of discharge pending progress):   
Placement: It is my opinion, based on this patient's performance to date, that Ms. Branham may benefit from intensive therapy at a [address] [place] [address] after discharge due to the functional deficits listed above that are likely to improve with skilled rehabilitation and concerns that he/she may be unsafe to be unsupervised at home due to decreased functional mobility and balance/gait status from baseline. Equipment: ? TBD HISTORY:  
History of Present Injury/Illness (Reason for Referral): S/p R hip hemiarthroplasty Past Medical History/Comorbidities: Ms. Araceli Menon  has a past medical history of Alzheimer's dementia ([facility] Anxiety state, unspecified, CAD (coronary artery disease), Diabetes ([facility] (8/27/2009), GERD (gastroesophageal reflux disease), History of snoring (7/13/2016), History of stroke (10/19/2016), Hypertension, Hypothyroid (8/27/2009), Late onset Alzheimer's disease without behavioral disturbance ([facility] (07/12/2012), Mild dementia ([facility] (8/30/2016), Mixed hyperlipidemia, Osteopenia, Reactive depression (5/16/2016), Retinopathy due to secondary diabetes ([facility] (8/27/2009), and Sensory polyneuropathy (8/30/2016). Ms. Araceli Menon  has a past surgical history that includes hx cholecystectomy (1993); hx urological; hx knee arthroscopy; hx heent; hx tonsillectomy (as child); hx other surgical (2008); hx angioplasty (2009); hx other surgical; hx thyroidectomy; hx coronary artery bypass graft (2009); and hx total vaginal hysterectomy. Social History/Living Environment:  
Home Environment: Assisted living Care Facility Name: [facility] # Steps to Enter: 0 One/Two Story Residence: One story Living Alone: No 
Support Systems: Family member(s) Patient Expects to be Discharged to[de-identified] Skilled nursing facility Current DME Used/Available at Home: None Prior Level of Function/Work/Activity: 
History provided by patient's daughter at bedside: Patient lives at an [facility] where she requires assistance with ADLs and ambulates without utilizing an assistive device. Patient with history of recent falls. Number of Personal Factors/Comorbidities that affect the Plan of Care: 1-2: MODERATE COMPLEXITY EXAMINATION:  
Most Recent Physical Functioning:  
Gross Assessment: 
  
         
  
Posture: 
  
Balance: 
Sitting: Impaired Sitting - Static: Fair (occasional) Sitting - Dynamic: Fair (occasional) Standing: Impaired Standing - Static: Poor Standing - Dynamic : Poor Bed Mobility: 
Rolling: Moderate assistance Sit to Supine: Moderate assistance Scooting: Moderate assistance Wheelchair Mobility: 
  
Transfers: 
Sit to Stand: Moderate assistance;Assist x1 Stand to Sit: Moderate assistance;Assist x1 Stand Pivot Transfers: Assist x1 Bed to Chair: Moderate assistance;Assist x1 Gait: 
  
   
  
Body Structures Involved: 1. Bones 2. Joints 3. Muscles Body Functions Affected: 1. Neuromusculoskeletal 
2. Movement Related Activities and Participation Affected: 1. Mobility 2. Self Care Number of elements that affect the Plan of Care: 4+: HIGH COMPLEXITY CLINICAL PRESENTATION:  
Presentation: Stable and uncomplicated: LOW COMPLEXITY CLINICAL DECISION MAKING:  
[facility] AM-PAC 6 Clicks Basic Mobility Inpatient Short Form How much difficulty does the patient currently have. .. Unable A Lot A Little None 1. Turning over in bed (including adjusting bedclothes, sheets and blankets)? [] 1   [] 2   [x] 3   [] 4  
2. Sitting down on and standing up from a chair with arms ( e.g., wheelchair, bedside commode, etc.)   [] 1   [x] 2   [] 3   [] 4  
3. Moving from lying on back to sitting on the side of the bed? [] 1   [x] 2   [] 3   [] 4 How much help from another person does the patient currently need. .. Total A Lot A Little None 4. Moving to and from a bed to a chair (including a wheelchair)? [] 1   [x] 2   [] 3   [] 4  
5. Need to walk in hospital room? [] 1   [x] 2   [] 3   [] 4  
6. Climbing 3-5 steps with a railing? [] 1   [x] 2   [] 3   [] 4  
© 2007, Trustees of [facility], under license to [employer]. All rights reserved Score:  Initial: 13 Most Recent: 12 (Date: 11/26/19) Interpretation of Tool:  Represents activities that are increasingly more difficult (i.e. Bed mobility, Transfers, Gait). Medical Necessity:    
· Patient demonstrates · good ·  rehab potential due to higher previous functional level. Reason for Services/Other Comments: · Patient continues to require skilled intervention due to · Decreased functional mobility and balance/gait status from baselin · . Use of outcome tool(s) and clinical judgement create a POC that gives a: Clear prediction of patient's progress: LOW COMPLEXITY  
  
 
 
 
TREATMENT:  
(In addition to Assessment/Re-Assessment sessions the following treatments were rendered) Pre-treatment Symptoms/Complaints:   
Pain: Initial:  
Pain Intensity 1: 0  Post Session:  0/10 at rest in bedside chair; endorses comfort and FLACC 0 Initial Evaluation (untimed charge) Therapeutic Activity: (    23 MInutes): Therapeutic activities including bed mobility, transfer training, balance training, safety awareness training, ambulation on level ground x5ft, and patient education to improve mobility and strength. Required moderate Safety awareness training; Tactile cues; Verbal cues to promote static and dynamic balance in sitting and promote coordination of right, lower extremity(s). At this time, patient is appropriate for Co-treatment with occupational therapy due to patient's decreased overall endurance/tolerance levels, as well as need for high level skilled assistance to complete functional transfers/mobility and functional tasks. [place] is appropriate for a multidisciplinary co-treatment of PT and OT to address goals of both disciplines. Braces/Orthotics/Lines/Etc:  
· IV 
· O2 Device: Nasal cannula Treatment/Session Assessment:   
· Response to Treatment:  Patient has difficult following directions. · Interdisciplinary Collaboration:  
o Physical Therapy Assistant 
o Registered Nurse 
o Certified Nursing Assistant/Patient Care Technician · After treatment position/precautions:  
o Bed alarm/tab alert on 
o Bed/Chair-wheels locked 
o Bed in low position 
o Call light within reach 
o RN notified 
o Family at bedside 
o Needs within reach; chair alarm activated; Family educated · Compliance with Program/Exercises: Will assess as treatment progresses · Recommendations/Intent for next treatment session: \"Next visit will focus on advancements to more challenging activities and reduction in assistance provided\". Total Treatment Duration: PT Patient Time In/Time Out Time In: 1410 Time Out: 1885 Aaron Machado PTA

## 2019-11-26 NOTE — PROGRESS NOTES
SW met with pt/family to discuss rehab options. They have contacted [facility] about accepting the pt even though the facility is not in network with [facility]. [address] is willing to accept the pt and try to work it out with [employer]. Referral submitted to [facility] via [facility] Link. SW also reached out to their admissions director to alert her to the new referral.  SW to continue to follow to facilitate pt's transfer to rehab at discharge. 1614:  Pt accepted for admission to [facility]. Authorization request faxed to [facility]. Family is hopeful pt is approved and can transfer to rehab tomorrow. SW following. Care Management Interventions PCP Verified by CM: Yes Last Visit to PCP: 10/18/19 Mode of Transport at Discharge: BLS Transition of Care Consult (CM Consult): Discharge Planning Discharge Durable Medical Equipment: No 
Physical Therapy Consult: Yes Occupational Therapy Consult: Yes Speech Therapy Consult: No 
Current Support Network: Family Lives Nearby, Assisted Living([facility]) Confirm Follow Up Transport: Family Plan discussed with Pt/Family/Caregiver: Yes Freedom of Choice Offered: Yes Discharge Location Discharge Placement: Rehab Unit Subacute

## 2019-11-26 NOTE — PERIOP NOTES
TRANSFER - OUT REPORT: 
 
Verbal report given to Irvin Webb on [address]  being transferred to [address] [place] [address] for routine post - op Report consisted of patients Situation, Background, Assessment and  
Recommendations(SBAR). Information from the following report(s) SBAR, OR Summary, Procedure Summary, Intake/Output and Cardiac Rhythm Sinus 80's was reviewed with the receiving nurse. Lines:  
Peripheral IV 11/25/19 Left Antecubital (Active) Site Assessment Clean, dry, & intact 11/25/2019  6:57 PM  
Phlebitis Assessment 0 11/25/2019  6:57 PM  
Infiltration Assessment 0 11/25/2019  6:57 PM  
Dressing Status Clean, dry, & intact 11/25/2019  6:57 PM  
Dressing Type Tape;Transparent 11/25/2019  6:57 PM  
Hub Color/Line Status Pink; Infusing 11/25/2019  6:57 PM  
Action Taken Open ports on tubing capped 11/25/2019  6:20 AM  
Alcohol Cap Used No 11/25/2019  6:20 AM  
  
 
Opportunity for questions and clarification was provided. Patient transported with: 
 O2 @ 3 liters VTE prophylaxis orders have been written for [facility]. Patient and family given floor number and nurses name.

## 2019-11-26 NOTE — PROGRESS NOTES
Problem: Mobility Impaired (Adult and Pediatric) Goal: *Acute Goals and Plan of Care (Insert Text) Description ST. Patient will perform bed mobility with MINIMAL ASSISTANCE within 3 days. 2. Patient will transfer bed to chair with MINIMAL ASSISTANCE x2 within 3 days. 3. Patient will demonstrate GOOD DYNAMIC SITTING balance within 3 day(s). 4. Patient will ambulate 25+ using least restrictive assistive device and MINIMAL ASSISTANCE x2 within 3 days. 5. Patient will tolerate 15+ minutes of therapeutic activity/exercise and/or neuromuscular re-education while maintaining stable vitals to improve functional strength and activity tolerance within 3 days. LT. Patient will perform bed mobility with CONTACT GUARD ASSISTANCE within 7 days. 2. Patient will transfer bed to chair with MINIMAL ASSISTANCE x1 within 7 days. 3. Patient will demonstrate FAIR DYNAMIC STANDING balance within 7 day(s). 4. Patient will ambulate 50+ using least restrictive assistive device and MINIMAL ASSISTANCE within 7 days. 5. Patient will tolerate 25+ minutes of therapeutic activity/exercise and/or neuromuscular re-education while maintaining stable vitals to improve functional strength and activity tolerance within 7 days. Outcome: Progressing Towards Goal 
  
 
PHYSICAL THERAPY: Initial Assessment, Daily Note, and AM 2019 INPATIENT: PT Visit Days : 1 Payor: [facility] / Plan: [facility] [employer] MEDICARE CHOICE PPO/PFFS / Product Type: Managed Care Medicare /   
 
WBAT R LE Hip Precautions NAME/AGE/GENDER: David Perez is a 78 y.o. female PRIMARY DIAGNOSIS: Hip fracture requiring operative repair [facility]) [S72.009A] Closed fracture of neck of right femur ([address] Closed fracture of neck of right femur ([address] Procedure(s) (LRB): 
RIGHT HIP HEMIARTHROPLASTY (Right) 1 Day Post-Op ICD-10: Treatment Diagnosis:  
 Difficulty in walking, Not elsewhere classified (R26.2) History of falling (Z91.81) Precaution/Allergies: 
Ace inhibitors; Aricept [donepezil]; Dextrose; Gatifloxacin; Levaquin [levofloxacin]; Macrodantin [nitrofurantoin macrocrystalline]; Morphine; Namenda [memantine]; Carmencita; and Sulfa (sulfonamide antibiotics) ASSESSMENT:  
 
Ms. Chaya Mendoza is a 78year old female admitted with fall and 1 day s/p right hip hemiarthroplasty. Patient is WBAT with posterior hip precautions. Patient seen this AM for initial physical therapy evaluation: presents supine in bed, oriented to person only. and endorses no pain at rest and c/o pain with mobility. History provided by patient's daughter at bedside: Patient lives at an [facility] where she requires assistance with ADLs and ambulates without utilizing an assistive device. Patient with history of recent falls. Educated patient/daughter on role of acute PT, goals, plan of care, hip precautions, and KI indications/wear schedule. Today, expected post operative functional strength deficits appreciated in R LE, L LE AROM WFL and strength 4-/5, and sensation is intact to light touch distal B LE. Patient performed bed mobility with moderate assistance x2, transfers with minimal to moderate assistance x2, and ambulation x5ft bed to chair with RW. Patient required verbal, tactile, and manual cues for gait mechanics, RW negotiation, and dynamic standing balance. Ms. Chaya Mendoza presents with decreased functional mobility and balance/gait status from baseline. Recommend continued skilled PT services to address stated deficits. Will follow and progress toward stated goals during acute stay. This section established at most recent assessment PROBLEM LIST (Impairments causing functional limitations): 
Decreased Strength Decreased ADL/Functional Activities Decreased Transfer Abilities Decreased Ambulation Ability/Technique Decreased Balance Increased Pain Decreased Activity Tolerance Decreased Flexibility/Joint Mobility Decreased Knowledge of Precautions INTERVENTIONS PLANNED: (Benefits and precautions of physical therapy have been discussed with the patient.) Balance Exercise Bed Mobility Family Education Gait Training Home Exercise Program (HEP) Neuromuscular Re-education/Strengthening Range of Motion (ROM) Therapeutic Activites Therapeutic Exercise/Strengthening Transfer Training TREATMENT PLAN: Frequency/Duration: twice daily for duration of hospital stay Rehabilitation Potential For Stated Goals: Good REHAB RECOMMENDATIONS (at time of discharge pending progress):   
Placement: It is my opinion, based on this patient's performance to date, that Ms. Branham may benefit from intensive therapy at a [address] [place] [address] after discharge due to the functional deficits listed above that are likely to improve with skilled rehabilitation and concerns that he/she may be unsafe to be unsupervised at home due to decreased functional mobility and balance/gait status from baseline. Equipment: TBD HISTORY:  
History of Present Injury/Illness (Reason for Referral): S/p R hip hemiarthroplasty Past Medical History/Comorbidities: Ms. Von Montes  has a past medical history of Alzheimer's dementia ([address] Anxiety state, unspecified, CAD (coronary artery disease), Diabetes ([address] (8/27/2009), GERD (gastroesophageal reflux disease), History of snoring (7/13/2016), History of stroke (10/19/2016), Hypertension, Hypothyroid (8/27/2009), Late onset Alzheimer's disease without behavioral disturbance ([address] (07/12/2012), Mild dementia ([address] (8/30/2016), Mixed hyperlipidemia, Osteopenia, Reactive depression (5/16/2016), Retinopathy due to secondary diabetes ([address] (8/27/2009), and Sensory polyneuropathy (8/30/2016).   Ms. Von Montes  has a past surgical history that includes hx cholecystectomy (1993); hx urological; hx knee arthroscopy; hx heent; hx tonsillectomy (as child); hx other surgical (2008); hx angioplasty (2009); hx other surgical; hx thyroidectomy; hx coronary artery bypass graft (2009); and hx total vaginal hysterectomy. Social History/Living Environment:  
Home Environment: Assisted living Care Facility Name: [facility] # Steps to Enter: 0 One/Two Story Residence: One story Living Alone: No 
Support Systems: Family member(s) Patient Expects to be Discharged to[de-identified] Skilled nursing facility Current DME Used/Available at Home: None Prior Level of Function/Work/Activity: 
History provided by patient's daughter at bedside: Patient lives at an [facility] where she requires assistance with ADLs and ambulates without utilizing an assistive device. Patient with history of recent falls. Number of Personal Factors/Comorbidities that affect the Plan of Care: 1-2: MODERATE COMPLEXITY EXAMINATION:  
Most Recent Physical Functioning:  
Gross Assessment: 
AROM: Generally decreased, functional 
Strength: Generally decreased, functional 
Sensation: Intact(noxious and light touch B LE) Posture: 
  
Balance: 
Sitting: Impaired Sitting - Static: Fair (occasional) Sitting - Dynamic: Fair (occasional) Standing: Impaired Standing - Static: Poor Standing - Dynamic : Poor Bed Mobility: 
Supine to Sit: Moderate assistance Scooting: Maximum assistance Interventions: Safety awareness training; Tactile cues; Verbal cues; Visual cues Wheelchair Mobility: 
  
Transfers: 
Sit to Stand: Minimum assistance; Moderate assistance;Assist x2 Stand to Sit: Minimum assistance; Moderate assistance;Assist x2 Bed to Chair: Minimum assistance; Moderate assistance;Assist x2 Interventions: Safety awareness training; Tactile cues; Verbal cues Gait: 
Right Side Weight Bearing: As tolerated Left Side Weight Bearing: Full Base of Support: Narrowed; Center of gravity altered Speed/Yamilet: Shuffled Step Length: Left shortened;Right shortened Swing Pattern: Right asymmetrical 
Stance: Right decreased; Left increased Gait Abnormalities: Antalgic;Decreased step clearance; Step to gait Distance (ft): 5 Feet (ft) Assistive Device: Walker, rolling Ambulation - Level of Assistance: Minimal assistance; Moderate assistance;Assist x2 Interventions: Safety awareness training; Tactile cues; Verbal cues Body Structures Involved: 
Bones Joints Muscles Body Functions Affected: 
Neuromusculoskeletal 
Movement Related Activities and Participation Affected: Mobility Self Care Number of elements that affect the Plan of Care: 4+: HIGH COMPLEXITY CLINICAL PRESENTATION:  
Presentation: Stable and uncomplicated: LOW COMPLEXITY CLINICAL DECISION MAKING:  
[facility] AM-PAC 6 Clicks Basic Mobility Inpatient Short Form How much difficulty does the patient currently have. .. Unable A Lot A Little None 1. Turning over in bed (including adjusting bedclothes, sheets and blankets)? [] 1   [] 2   [x] 3   [] 4  
2. Sitting down on and standing up from a chair with arms ( e.g., wheelchair, bedside commode, etc.)   [] 1   [x] 2   [] 3   [] 4  
3. Moving from lying on back to sitting on the side of the bed? [] 1   [x] 2   [] 3   [] 4 How much help from another person does the patient currently need. .. Total A Lot A Little None 4. Moving to and from a bed to a chair (including a wheelchair)? [] 1   [x] 2   [] 3   [] 4  
5. Need to walk in hospital room? [] 1   [x] 2   [] 3   [] 4  
6. Climbing 3-5 steps with a railing? [] 1   [x] 2   [] 3   [] 4  
© 2007, Trustees of [facility] [employer], under license to [employer]. All rights reserved Score:  Initial: 13 Most Recent: 12 (Date: 11/26/19) Interpretation of Tool:  Represents activities that are increasingly more difficult (i.e. Bed mobility, Transfers, Gait). Medical Necessity:    
Patient demonstrates  
good 
 rehab potential due to higher previous functional level. Reason for Services/Other Comments: Patient continues to require skilled intervention due to Decreased functional mobility and balance/gait status from yanet Tillman Use of outcome tool(s) and clinical judgement create a POC that gives a: Clear prediction of patient's progress: LOW COMPLEXITY  
  
 
 
 
TREATMENT:  
(In addition to Assessment/Re-Assessment sessions the following treatments were rendered) Pre-treatment Symptoms/Complaints:   
Pain: Initial:  
Pain Intensity 1: 0(no pain at rest; c/o pain with mobility)  Post Session:  0/10 at rest in bedside chair; endorses comfort and FLACC 0 Initial Evaluation (untimed charge) Therapeutic Activity: (    23 MInutes): Therapeutic activities including bed mobility, transfer training, balance training, safety awareness training, ambulation on level ground x5ft, and patient education to improve mobility and strength. Required moderate Safety awareness training; Tactile cues; Verbal cues to promote static and dynamic balance in sitting and promote coordination of right, lower extremity(s). At this time, patient is appropriate for Co-treatment with occupational therapy due to patient's decreased overall endurance/tolerance levels, as well as need for high level skilled assistance to complete functional transfers/mobility and functional tasks. Candy Oppenheim is appropriate for a multidisciplinary co-treatment of PT and OT to address goals of both disciplines. Braces/Orthotics/Lines/Etc:  
IV 
O2 Device: Nasal cannula Treatment/Session Assessment:   
Response to Treatment:  See above. Interdisciplinary Collaboration:  
Physical Therapist 
Occupational Therapist 
Registered Nurse Certified Nursing Assistant/Patient Care Technician After treatment position/precautions:  
Up in chair Bed alarm/tab alert on Bed/Chair-wheels locked Bed in low position Call light within reach RN notified Family at bedside Needs within reach; chair alarm activated; Family educated Compliance with Program/Exercises: Will assess as treatment progresses Recommendations/Intent for next treatment session: \"Next visit will focus on advancements to more challenging activities and reduction in assistance provided\". Total Treatment Duration: PT Patient Time In/Time Out Time In: 0621 Time Out: 7753 iVto Santamaria DPT

## 2019-11-26 NOTE — PROGRESS NOTES
Hospitalist Note Admit Date:  2019  3:09 AM  
Name:  Candy Oppenheim Age:  78 y.o. 
:  1940 MRN:  [id number] PCP:  Larayne Riedel, MD 
 
Subjective:  
 
77 y/o F with dementia, long term resident, CAD/CABG x3, hx CVA, DM, who presented to ER after fall at home. She was getting up around 2am to adjust the heat when she fell on her R side, landed on her R hip with severe sharp pain following. No head trauma/LOC. Unable to bear weight. No preceding symptoms. She managed to get to her door and open it; staff desk is right across from her room and she was brought to ER Today, pain controlled, no CP/SOB or cough, OOB to chair Objective:  
 
Patient Vitals for the past 24 hrs: 
 Temp Pulse Resp BP SpO2  
19 1428 97.7 °F (36.5 °C) 81 16 108/55 95 % 19 1122 98.2 °F (36.8 °C) 85 16 107/52 95 % 19 0721 99.1 °F (37.3 °C) 86 17 121/69 93 % 19 0302 98.7 °F (37.1 °C) 87 18 118/57 92 % 19 2314 98.2 °F (36.8 °C) 74 17 110/63 94 % 19 1945  79  121/53   
19 1930 97.3 °F (36.3 °C) 74 16 135/64 95 % 19 1916  84   93 % 19 1901  81 16 130/60 95 % 19 1857 98.7 °F (37.1 °C) 78 12  94 % 19 1846  80 13 130/60 95 % 19 1841  85 16 130/62 99 % 19 1836  77 13 120/58 99 % 19 1832  74 13 112/56 99 % 19 1827 98.5 °F (36.9 °C) 88 19 114/56 98 % Oxygen Therapy O2 Sat (%): 95 % (19 1428) Pulse via Oximetry: 84 beats per minute (19) O2 Device: Nasal cannula (19) O2 Flow Rate (L/min): 3 l/min (19) Intake/Output Summary (Last 24 hours) at 2019 1609 Last data filed at 2019 0740 Gross per 24 hour Intake 700 ml Output 975 ml Net -275 ml Physical Exam: 
General:    Well nourished. Alert. Mild distress CV:   RRR. No murmur, rub, or gallop. Lungs:  Clear to auscultation bilaterally. No wheezing, rhonchi, or rales. Abdomen: Soft, nontender, nondistended. Extremities: Warm and dry. No cyanosis or edema. Neurologic: grossly intact. Skin:     No rashes or jaundice. No wounds. Psych:  Normal mood and affect. I reviewed the labs, imaging, EKGs, telemetry, and other studies done this admission. Data Review:  
Recent Results (from the past 24 hour(s)) GLUCOSE, POC Collection Time: 11/25/19  9:12 PM  
Result Value Ref Range Glucose (POC) 156 (H) 65 - 100 mg/dL CBC WITH AUTOMATED DIFF Collection Time: 11/25/19 10:25 PM  
Result Value Ref Range WBC 12.5 (H) 4.3 - 11.1 K/uL  
 RBC 3.65 (L) 4.05 - 5.2 M/uL  
 HGB 10.9 (L) 11.7 - 15.4 g/dL HCT 34.8 (L) 35.8 - 46.3 % MCV 95.3 79.6 - 97.8 FL  
 MCH 29.9 26.1 - 32.9 PG  
 MCHC 31.3 (L) 31.4 - 35.0 g/dL  
 RDW 12.4 11.9 - 14.6 % PLATELET 601 605 - 917 K/uL MPV 10.9 9.4 - 12.3 FL ABSOLUTE NRBC 0.00 0.0 - 0.2 K/uL  
 DF AUTOMATED NEUTROPHILS 79 (H) 43 - 78 % LYMPHOCYTES 9 (L) 13 - 44 % MONOCYTES 11 4.0 - 12.0 % EOSINOPHILS 1 0.5 - 7.8 % BASOPHILS 0 0.0 - 2.0 % IMMATURE GRANULOCYTES 0 0.0 - 5.0 %  
 ABS. NEUTROPHILS 9.9 (H) 1.7 - 8.2 K/UL  
 ABS. LYMPHOCYTES 1.1 0.5 - 4.6 K/UL  
 ABS. MONOCYTES 1.4 (H) 0.1 - 1.3 K/UL  
 ABS. EOSINOPHILS 0.1 0.0 - 0.8 K/UL  
 ABS. BASOPHILS 0.0 0.0 - 0.2 K/UL  
 ABS. IMM. GRANS. 0.0 0.0 - 0.5 K/UL  
PLEASE READ & DOCUMENT PPD TEST IN 24 HRS Collection Time: 11/26/19  6:09 AM  
Result Value Ref Range PPD Negative Negative  
 mm Induration 0 0 - 5 mm METABOLIC PANEL, BASIC Collection Time: 11/26/19  6:10 AM  
Result Value Ref Range Sodium 138 136 - 145 mmol/L Potassium 3.8 3.5 - 5.1 mmol/L Chloride 105 98 - 107 mmol/L  
 CO2 27 21 - 32 mmol/L Anion gap 6 (L) 7 - 16 mmol/L Glucose 152 (H) 65 - 100 mg/dL BUN 21 8 - 23 MG/DL Creatinine 1.11 (H) 0.6 - 1.0 MG/DL  
 GFR est AA >60 >60 ml/min/1.73m2 GFR est non-AA 50 (L) >60 ml/min/1.73m2  Calcium 8.7 8.3 - 10.4 MG/DL  
 CBC WITH AUTOMATED DIFF Collection Time: 11/26/19  6:10 AM  
Result Value Ref Range WBC 11.1 4.3 - 11.1 K/uL  
 RBC 3.51 (L) 4.05 - 5.2 M/uL  
 HGB 10.6 (L) 11.7 - 15.4 g/dL HCT 33.7 (L) 35.8 - 46.3 % MCV 96.0 79.6 - 97.8 FL  
 MCH 30.2 26.1 - 32.9 PG  
 MCHC 31.5 31.4 - 35.0 g/dL  
 RDW 12.3 11.9 - 14.6 % PLATELET 276 711 - 010 K/uL MPV 11.0 9.4 - 12.3 FL ABSOLUTE NRBC 0.00 0.0 - 0.2 K/uL  
 DF AUTOMATED NEUTROPHILS 77 43 - 78 % LYMPHOCYTES 6 (L) 13 - 44 % MONOCYTES 15 (H) 4.0 - 12.0 % EOSINOPHILS 2 0.5 - 7.8 % BASOPHILS 0 0.0 - 2.0 % IMMATURE GRANULOCYTES 0 0.0 - 5.0 %  
 ABS. NEUTROPHILS 8.5 (H) 1.7 - 8.2 K/UL  
 ABS. LYMPHOCYTES 0.7 0.5 - 4.6 K/UL  
 ABS. MONOCYTES 1.7 (H) 0.1 - 1.3 K/UL  
 ABS. EOSINOPHILS 0.2 0.0 - 0.8 K/UL  
 ABS. BASOPHILS 0.0 0.0 - 0.2 K/UL  
 ABS. IMM. GRANS. 0.0 0.0 - 0.5 K/UL GLUCOSE, POC Collection Time: 11/26/19  7:02 AM  
Result Value Ref Range Glucose (POC) 158 (H) 65 - 100 mg/dL GLUCOSE, POC Collection Time: 11/26/19 10:42 AM  
Result Value Ref Range Glucose (POC) 168 (H) 65 - 100 mg/dL GLUCOSE, POC Collection Time: 11/26/19  3:55 PM  
Result Value Ref Range Glucose (POC) 346 (H) 65 - 100 mg/dL All Micro Results Procedure Component Value Units Date/Time MSSA/MRSA SC BY PCR, NASAL SWAB [[id number]] Collected:  11/25/19 9991 Order Status:  Completed Specimen:  Nasal swab Updated:  11/25/19 1008 Special Requests: NO SPECIAL REQUESTS Culture result:    
  SA target not detected. A MRSA NEGATIVE, SA NEGATIVE test result does not preclude MRSA or SA nasal colonization. Current Facility-Administered Medications Medication Dose Route Frequency  [START ON 11/27/2019] senna-docusate (PERICOLACE) 8.6-50 mg per tablet 1 Tab  1 Tab Oral DAILY  buPROPion SR (WELLBUTRIN SR) tablet 150 mg  150 mg Oral DAILY  citalopram (CELEXA) tablet 20 mg  20 mg Oral DAILY  isosorbide mononitrate ER (IMDUR) tablet 60 mg  60 mg Oral DAILY  levothyroxine (SYNTHROID) tablet 100 mcg  100 mcg Oral ACB  metoprolol tartrate (LOPRESSOR) tablet 50 mg  50 mg Oral BID  pantoprazole (PROTONIX) tablet 40 mg  40 mg Oral ACB  insulin lispro (HUMALOG) injection   SubCUTAneous AC&HS  
 dextrose 40% (GLUTOSE) oral gel 1 Tube  15 g Oral PRN  
 glucagon (GLUCAGEN) injection 1 mg  1 mg IntraMUSCular PRN  
 dextrose (D50W) injection syrg 12.5-25 g  25-50 mL IntraVENous PRN  
 sodium chloride (NS) flush 5-40 mL  5-40 mL IntraVENous Q8H  
 sodium chloride (NS) flush 5-40 mL  5-40 mL IntraVENous PRN  
 ceFAZolin (ANCEF) 1,000 mg in 0.9% sodium chloride (MBP/ADV) 50 mL ADV  1 g IntraVENous Q8H  
 alum-mag hydroxide-simeth (MYLANTA) oral suspension 30 mL  30 mL Oral Q4H PRN  
 calcium-vitamin D (OS-KVNG) 500 mg-200 unit tablet  1 Tab Oral TID WITH MEALS  enoxaparin (LOVENOX) injection 40 mg  40 mg SubCUTAneous DAILY  HYDROmorphone (PF) (DILAUDID) injection 0.5 mg  0.5 mg IntraVENous Q4H PRN  
 oxyCODONE IR (ROXICODONE) tablet 5 mg  5 mg Oral Q4H PRN Other Studies: 
Xr Hip Rt W Or Wo Pelv 2-3 Vws Result Date: 11/25/2019 PELVIS AND RIGHT HIP, 3 views. HISTORY: Postop right hip TECHNIQUE: AP view of the pelvis and coned down AP and frogleg lateral of the hip. FINDINGS: Bony pelvic ring: Appears intact. SI joints: Appear symmetric. Pubic rami: Appear intact. Femur: Right hip arthroplasty is been performed. The femoral stem appears centrally located in the medullary cavity. No bony fracture. Gas lucencies and skin staples are present consistent with immediate postoperative state. IMPRESSION:  Status post right hip arthroplasty. Assessment and Plan:  
 
Hospital Problems as of 11/26/2019 Date Reviewed: 4/20/2017 Codes Class Noted - Resolved POA Hip fracture requiring operative repair [facility]) ICD-10-CM: G36.610W ICD-9-CM: 820.8  11/25/2019 - Present Yes * (Principal) Closed fracture of neck of right femur ([facility] ICD-10-CM: S72.001A ICD-9-CM: 820.8  11/25/2019 - Present Yes Type 2 diabetes with nephropathy (HCC) (Chronic) ICD-10-CM: E11.21 
ICD-9-CM: 250.40, 583.81  3/13/2018 - Present Yes Late onset Alzheimer's disease without behavioral disturbance (HCC) (Chronic) ICD-10-CM: G30.1, F02.80 ICD-9-CM: 331.0, 294.10  7/12/2017 - Present Yes History of stroke (Chronic) ICD-10-CM: D93.75 
ICD-9-CM: V12.54  10/19/2016 - Present Yes Hypothyroidism, adult (Chronic) ICD-10-CM: E03.9 ICD-9-CM: 244.9  9/23/2015 - Present Yes CAD (coronary artery disease) (Chronic) ICD-10-CM: I25.10 ICD-9-CM: 414.00  8/27/2009 - Present Yes Essential hypertension (Chronic) ICD-10-CM: I10 
ICD-9-CM: 401.9  8/27/2009 - Present Yes Retinopathy due to secondary diabetes ([facility] (Chronic) ICD-10-CM: Z03.797 ICD-9-CM: 249.50, 362.01  8/27/2009 - Present Yes Dx; 
1- Right hip fx due to mechanical fall, s/p hemiarthroplasty 2- HTN and NIDDM, controlled 3- CAD, stable 4- Alzheimer dementia Rx: 
Activity as tolerated Pain control Orthopedic following Dispo: rehab Signed: 
Argenis Patel MD

## 2019-11-26 NOTE — PROGRESS NOTES
November 26, 2019 Post Op day: 1 Day Post-Op Procedure(s) (LRB): 
RIGHT HIP HEMIARTHROPLASTY (Right) Admit Date: 11/25/2019 Admit Diagnosis: Hip fracture requiring operative repair [facility] Stefany Trujillo Principle Problem: Closed fracture of neck of right femur ([facility] Subjective: Pt complains that she has not urinated today and has not had a BM, No SOB, No Chest Pain, No Nausea or Vomiting Objective:  
Vital Signs are Stable, No Acute Distress, Alert and Oriented, Dressing is Dry,  Neurovascular exam is normal.  
 
Assessment / Plan : 
Patient Active Problem List  
Diagnosis Code  CAD (coronary artery disease) I25.10  Essential hypertension I10  Dyslipidemia E78.5  GERD (gastroesophageal reflux disease) K21.9  Retinopathy due to secondary diabetes ([facility] E13.319  
 Unspecified adjustment reaction F43.20  Anxiety state F41.1  Mixed hyperlipidemia E78.2  Memory loss R41.3  Osteopenia M85.80  Hypothyroidism, adult E03.9  Controlled type 2 diabetes mellitus with complication, without long-term current use of insulin ([facility]) E11.8  Unipolar depression ([facility] F33.9  Dermatitis L30.9  JUDD (obstructive sleep apnea) G47.33  
 Sensory polyneuropathy G60.8  History of stroke Z86.73  
 Fall W19. [place]  Imbalance R26.89  
 Diarrhea R19.7  Non-intractable cyclical vomiting with nausea R11.15  Late onset Alzheimer's disease without behavioral disturbance ([facility]) G30.1, F02.80  Acute cystitis without hematuria N30.00  Type 2 diabetes with nephropathy ([facility]) E11.21  
 Contusion, buttock, initial encounter S30. 0XXA  Hip fracture requiring operative repair ([facility] S72.009A  Closed fracture of neck of right femur ([facility] S72.001A Patient Vitals for the past 8 hrs: 
 BP Temp Pulse Resp SpO2  
11/26/19 1428 108/55 97.7 °F (36.5 °C) 81 16 95 % 11/26/19 1122 107/52 98.2 °F (36.8 °C) 85 16 95 % Temp (24hrs), Av.3 °F (36.8 °C), Min:97.3 °F (36.3 °C), Max:99.1 °F (37.3 °C) Body mass index is 22.67 kg/m². Lab Results Component Value Date/Time HGB 10.6 (L) 2019 06:10 AM  
  
Possible urinary retention: will need bladder scan and IO catheter Constipation: order pericolace Notify hospitalist  
Continue PT for postop hip Hold DC until patient able to independently void and has BM Signed By: ZAID Espinal 
2019,  3:47 PM

## 2019-11-26 NOTE — PROGRESS NOTES
Problem: Patient Education: Go to Patient Education Activity Goal: Patient/Family Education Description 1. Patient will verbalize and demonstrate understanding of hip precautions with 100% accuracy during ADL. 2. Patient will complete functional transfers with min A and adaptive equipment as needed. 3. Patient will complete lower body bathing and dressing with min A and adaptive equipment as needed. 4. Patient will complete toileting with SBA. 5. Patient will tolerate at least 15 minutes of BUE therapeutic exercises to increase strength in BUE to aid in functional transfers. 6. Patient will tolerate at least 23 minutes of OT treatment with no rest breaks to increase activity tolerance for ADLs. Timeframe: 7 visits Outcome: Progressing Towards Goal 
 
 
OCCUPATIONAL THERAPY: Initial Assessment, Daily Note, and AM 11/26/2019 INPATIENT: OT Visit Days: 1 WBAT in R  
Payor: HUMANA MEDICARE / Plan: [facility] HU[employer]NA MEDICARE CHOICE PPO/PFFS / Product Type: [employer] Care Medicare /  
  
NAME/AGE/GENDER: Kobe Davis is a 78 y.o. female PRIMARY DIAGNOSIS:  Hip fracture requiring operative repair [facility]) [S72.009A] Closed fracture of neck of right femur ([facility] Closed fracture of neck of right femur ([facility] [address] Procedure(s) (LRB): 
RIGHT HIP HEMIARTHROPLASTY (Right) 1 Day Post-Op ICD-10: Treatment Diagnosis:  
 Generalized Muscle Weakness (M62.81) Difficulty in walking, Not elsewhere classified (R26.2) History of falling (Z91.81) Precautions/Allergies: 
  Fall precautions Ace inhibitors; Aricept [donepezil]; Dextrose; Gatifloxacin; Levaquin [levofloxacin]; Macrodantin [nitrofurantoin macrocrystalline]; Morphine; Namenda [memantine]; Carmencita; and Sulfa (sulfonamide antibiotics) ASSESSMENT:  
 
Ms. Napoleon Cardona presents for R hip fracture, s/p R hemiarthroplasty, 1 day post op. Pt is currently WBAT in R .  Upon arrival, pt supine in bed with daughter at bedside. Pt is alert, however only oriented to person and place this session (PMH includes Alzheimer's); agreeable to OT evaluation. Per daughter, pt was living at [place] [id number] where she required assistance with bathing and dressing and was managing mobility independently with no AE. MD had recently told pt that she would need to begin using RW for mobility d/t increased falls, however pt had refused to use and DME. Today, pt required mod A for supine to sit transfer to edge of bed. Static sitting balance initially impaired requiring constant support, however with prop sitting and verbal cueing, pt able to independently maintain upright posture. Pt required min A x 2 for sit to stand transfer and ambulated ~5 ft with min A x 2 x RW. Max verbal cueing required for RW use and dynamic standing balance when ambulating to the chair. Pt left sitting upright in bedside chair with needs met, call light within reach, posey alarm on, and daughter at bedside. At this time, Segundo Sánchez is functioning below baseline for ADLs and functional mobility. Pt would benefit from skilled OT services to address OT goals and plan of care. This section established at most recent assessment PROBLEM LIST (Impairments causing functional limitations): 
Decreased Strength Decreased ADL/Functional Activities Decreased Transfer Abilities Decreased Ambulation Ability/Technique Decreased Balance Increased Pain Decreased Activity Tolerance Decreased Flexibility/Joint Mobility Decreased Knowledge of Precautions Decreased Cognition INTERVENTIONS PLANNED: (Benefits and precautions of occupational therapy have been discussed with the patient.) Activities of daily living training Adaptive equipment training Balance training Clothing management Cognitive training Community reintergration Donning&doffing training Neuromuscular re-eduation Re-evaluation Therapeutic activity Therapeutic exercise TREATMENT PLAN: Frequency/Duration: Follow patient 6x/week to address above goals. Rehabilitation Potential For Stated Goals: Good REHAB RECOMMENDATIONS (at time of discharge pending progress):   
Placement: It is my opinion, based on this patient's performance to date, that Ms. Branham may benefit from intensive therapy at a [facility] after discharge due to the functional deficits listed above that are likely to improve with skilled rehabilitation and concerns that he/she may be unsafe to be unsupervised at home due to decline in ability to safely perform ADLs and functional mobility. . 
Equipment: TBD OCCUPATIONAL PROFILE AND HISTORY:  
History of Present Injury/Illness (Reason for Referral): 
See H&P Past Medical History/Comorbidities: Ms. Ralph Callaway  has a past medical history of Alzheimer's dementia ([address] Anxiety state, unspecified, CAD (coronary artery disease), Diabetes ([address] (8/27/2009), GERD (gastroesophageal reflux disease), History of snoring (7/13/2016), History of stroke (10/19/2016), Hypertension, Hypothyroid (8/27/2009), Late onset Alzheimer's disease without behavioral disturbance ([address] (07/12/2012), Mild dementia ([address] (8/30/2016), Mixed hyperlipidemia, Osteopenia, Reactive depression (5/16/2016), Retinopathy due to secondary diabetes ([address] (8/27/2009), and Sensory polyneuropathy (8/30/2016). Ms. Ralph Callaway  has a past surgical history that includes hx cholecystectomy (1993); hx urological; hx knee arthroscopy; hx heent; hx tonsillectomy (as child); hx other surgical (2008); hx angioplasty (2009); hx other surgical; hx thyroidectomy; hx coronary artery bypass graft (2009); and hx total vaginal hysterectomy. Social History/Living Environment:  
Home Environment: Assisted living Care Facility [facility]: [facility] # Steps to Enter: 0 One/Two Story Residence: One story Living Alone: No 
Support Systems: Family member(s) Patient Expects to be Discharged to[de-identified] Skilled nursing facility Current DME Used/Available at Home: None Prior Level of Function/Work/Activity: 
Independent with mobility; assist required for bathing and dressing. Personal Factors:   
      Sex:  female Age:  78 y.o. Overall Behavior:  hx of falls Other factors that influence how disability is experienced by the patient:  multiple co-morbidities Number of Personal Factors/Comorbidities that affect the Plan of Care: Brief history (0):  LOW COMPLEXITY ASSESSMENT OF OCCUPATIONAL PERFORMANCE[de-identified]  
Activities of Daily Living:  
Basic ADLs (From Assessment) Complex ADLs (From Assessment) Feeding: Setup Oral Facial Hygiene/Grooming: Setup Bathing: Maximum assistance Upper Body Dressing: Minimum assistance Lower Body Dressing: Maximum assistance Toileting: Moderate assistance Instrumental ADL Meal Preparation: Total assistance Homemaking: Total assistance Grooming/Bathing/Dressing Activities of Daily Living Cognitive Retraining Safety/Judgement: Decreased insight into deficits; Decreased awareness of environment;Decreased awareness of need for assistance;Decreased awareness of need for safety; Fall prevention Bed/Mat Mobility Supine to Sit: Moderate assistance Sit to Stand: Minimum assistance;Assist x2 Stand to Sit: Minimum assistance;Assist x2 Bed to Chair: Minimum assistance;Assist x2 Scooting: Maximum assistance Most Recent Physical Functioning:  
Gross Assessment: 
AROM: Generally decreased, functional 
Strength: Generally decreased, functional 
Sensation: Intact Posture: 
  
Balance: 
Sitting: Impaired Sitting - Static: Fair (occasional) Sitting - Dynamic: Fair (occasional) Standing: Impaired Standing - Static: Poor Standing - Dynamic : Poor Bed Mobility: 
Supine to Sit: Moderate assistance Scooting: Maximum assistance Interventions: Safety awareness training; Tactile cues; Verbal cues; Visual cues Wheelchair Mobility: 
  
Transfers: 
Sit to Stand: Minimum assistance;Assist x2 Stand to Sit: Minimum assistance;Assist x2 Bed to Chair: Minimum assistance;Assist x2 Interventions: Safety awareness training; Tactile cues; Verbal cues Patient Vitals for the past 6 hrs: 
 BP BP Patient Position SpO2 Pulse 19 1122 107/52 Sitting 95 % 85 Mental Status Neurologic State: Alert, Confused Orientation Level: Oriented to person, Oriented to place Cognition: Decreased attention/concentration, Decreased command following Perception: Tactile, Verbal, Visual 
Perseveration: Tactile cues provided, Verbal cues provided, Visual cues provided Safety/Judgement: Decreased insight into deficits, Decreased awareness of environment, Decreased awareness of need for assistance, Decreased awareness of need for safety, Fall prevention Physical Skills Involved: 
Balance Strength Activity Tolerance Gross Motor Control Pain (acute) Cognitive Skills Affected (resulting in the inability to perform in a timely and safe manner): 
Perception Executive Function Immediate Memory Short Term Recall Long Term Memory Psychosocial Skills Affected: 
Habits/Routines Environmental Adaptation Number of elements that affect the Plan of Care: 5+:  HIGH COMPLEXITY CLINICAL DECISION MAKIN [facility] [address] [address] [facility] 6 Clicks Daily Activity Inpatient Short Form How much help from another person does the patient currently need. .. Total A Lot A Little None 1. Putting on and taking off regular lower body clothing? [] 1   [x] 2   [] 3   [] 4  
2. Bathing (including washing, rinsing, drying)? [] 1   [x] 2   [] 3   [] 4  
3. Toileting, which includes using toilet, bedpan or urinal?   [] 1   [x] 2   [] 3   [] 4  
4. Putting on and taking off regular upper body clothing?    [] 1   [] 2   [x] 3   [] 4  
 5. Taking care of personal grooming such as brushing teeth? [] 1   [] 2   [x] 3   [] 4  
6. Eating meals? [] 1   [] 2   [x] 3   [] 4  
© 2007, Trustees of [address] [place] [address] [address] [id number], under license to [employer]. All rights reserved Score:  Initial: 15 Most Recent: X (Date: -- ) Interpretation of Tool:  Represents activities that are increasingly more difficult (i.e. Bed mobility, Transfers, Gait). Medical Necessity:    
Patient demonstrates  
fair 
 rehab potential due to higher previous functional level. Reason for Services/Other Comments: 
Patient continues to require skilled intervention due to  
medical complications and patient unable to attend/participate in therapy as expected Waves Hind Use of outcome tool(s) and clinical judgement create a POC that gives a: LOW COMPLEXITY  
 
 
 
TREATMENT:  
(In addition to Assessment/Re-Assessment sessions the following treatments were rendered) Pre-treatment Symptoms/Complaints:   
Pain: Initial:  
Pain Intensity 1: 0 /10 Post Session:  same Neuromuscular Re-education: (15 minutes ):  Exercise/activities per grid below to improve balance, coordination, and posture. Required maximal visual, verbal, manual, and tactile cues to promote static and dynamic balance in sitting and standing/.  
       
Static sitting balance initially impaired requiring constant support, however with prop sitting and verbal cueing, pt able to independently maintain upright posture. Max verbal cueing required for RW use and dynamic standing balance when ambulating to the chair. Braces/Orthotics/Lines/Etc:  
IV 
O2 Device: Nasal cannula Treatment/Session Assessment:   
Response to Treatment:  tolerated well with no issues noted. Interdisciplinary Collaboration:  
Physical Therapist 
Occupational Therapist 
Registered Nurse After treatment position/precautions:  
Up in chair Bed alarm/tab alert on Bed/Chair-wheels locked Call light within reach Family at bedside Compliance with Program/Exercises: Will assess as treatment progresses. Recommendations/Intent for next treatment session: \"Next visit will focus on advancements to more challenging activities and reduction in assistance provided\". Total Treatment Duration: 
Time OCHOA:4916 Time Out: 1964 Natasha Nguyen, OT

## 2019-11-27 VITALS
BODY MASS INDEX: 22.68 KG/M2 | OXYGEN SATURATION: 96 % | SYSTOLIC BLOOD PRESSURE: 96 MMHG | HEIGHT: 63 IN | WEIGHT: 128 LBS | DIASTOLIC BLOOD PRESSURE: 57 MMHG | RESPIRATION RATE: 18 BRPM | TEMPERATURE: 98.1 F | HEART RATE: 79 BPM

## 2019-11-27 LAB
GLUCOSE BLD STRIP.AUTO-MCNC: 119 MG/DL (ref 65–100)
GLUCOSE BLD STRIP.AUTO-MCNC: 229 MG/DL (ref 65–100)
HGB BLD-MCNC: 9.1 G/DL (ref 11.7–15.4)
MM INDURATION POC: 0 MM (ref 0–5)
PPD POC: NEGATIVE NEGATIVE

## 2019-11-27 PROCEDURE — 74011250637 HC RX REV CODE- 250/637: Performed by: INTERNAL MEDICINE

## 2019-11-27 PROCEDURE — 36415 COLL VENOUS BLD VENIPUNCTURE: CPT

## 2019-11-27 PROCEDURE — 97116 GAIT TRAINING THERAPY: CPT

## 2019-11-27 PROCEDURE — 74011250637 HC RX REV CODE- 250/637: Performed by: ORTHOPAEDIC SURGERY

## 2019-11-27 PROCEDURE — 97530 THERAPEUTIC ACTIVITIES: CPT

## 2019-11-27 PROCEDURE — 85018 HEMOGLOBIN: CPT

## 2019-11-27 PROCEDURE — 74011636637 HC RX REV CODE- 636/637: Performed by: INTERNAL MEDICINE

## 2019-11-27 PROCEDURE — 97535 SELF CARE MNGMENT TRAINING: CPT

## 2019-11-27 PROCEDURE — 74011250636 HC RX REV CODE- 250/636: Performed by: ORTHOPAEDIC SURGERY

## 2019-11-27 PROCEDURE — 82962 GLUCOSE BLOOD TEST: CPT

## 2019-11-27 RX ORDER — FERROUS SULFATE, DRIED 160(50) MG
1 TABLET, EXTENDED RELEASE ORAL
Qty: 270 TAB | Refills: 0 | Status: SHIPPED | OUTPATIENT
Start: 2019-11-27

## 2019-11-27 RX ORDER — OXYCODONE HYDROCHLORIDE 5 MG/1
5 TABLET ORAL
Qty: 20 TAB | Refills: 0 | Status: SHIPPED | OUTPATIENT
Start: 2019-11-27 | End: 2019-12-04

## 2019-11-27 RX ORDER — ENOXAPARIN SODIUM 100 MG/ML
40 INJECTION SUBCUTANEOUS DAILY
Qty: 15 SYRINGE | Refills: 0 | Status: SHIPPED
Start: 2019-11-28 | End: 2020-01-03

## 2019-11-27 RX ORDER — AMOXICILLIN 250 MG
1 CAPSULE ORAL
Status: DISCONTINUED | OUTPATIENT
Start: 2019-11-27 | End: 2019-11-27 | Stop reason: HOSPADM

## 2019-11-27 RX ADMIN — Medication 10 ML: at 06:37

## 2019-11-27 RX ADMIN — BUPROPION HYDROCHLORIDE 150 MG: 150 TABLET, EXTENDED RELEASE ORAL at 10:32

## 2019-11-27 RX ADMIN — ENOXAPARIN SODIUM 40 MG: 40 INJECTION SUBCUTANEOUS at 10:33

## 2019-11-27 RX ADMIN — PANTOPRAZOLE SODIUM 40 MG: 40 TABLET, DELAYED RELEASE ORAL at 06:36

## 2019-11-27 RX ADMIN — CALCIUM CARBONATE 500 MG (1,250 MG)-VITAMIN D3 200 UNIT TABLET 1 TABLET: at 10:32

## 2019-11-27 RX ADMIN — METOPROLOL TARTRATE 50 MG: 50 TABLET, FILM COATED ORAL at 10:32

## 2019-11-27 RX ADMIN — ISOSORBIDE MONONITRATE 60 MG: 30 TABLET, EXTENDED RELEASE ORAL at 10:32

## 2019-11-27 RX ADMIN — LEVOTHYROXINE SODIUM 100 MCG: 100 TABLET ORAL at 06:36

## 2019-11-27 RX ADMIN — CALCIUM CARBONATE 500 MG (1,250 MG)-VITAMIN D3 200 UNIT TABLET 1 TABLET: at 11:47

## 2019-11-27 RX ADMIN — INSULIN LISPRO 4 UNITS: 100 INJECTION, SOLUTION INTRAVENOUS; SUBCUTANEOUS at 11:47

## 2019-11-27 RX ADMIN — CITALOPRAM HYDROBROMIDE 20 MG: 20 TABLET ORAL at 10:32

## 2019-11-27 NOTE — PROGRESS NOTES
Pt was medically cleared for discharge today and transferred to [facility] for STR services. Pt's dtr, Mak Oneill, was at the bedside at the time of discharge and transport by [employer]. Care Management Interventions PCP Verified by CM: Yes Last Visit to PCP: 10/18/19 Mode of Transport at Discharge: BLS([facility]) Hospital Transport Time of Discharge: 1430 Transition of Care Consult (CM Consult): SNF Partner SNF: Yes Discharge Durable Medical Equipment: No 
Physical Therapy Consult: Yes Occupational Therapy Consult: Yes Speech Therapy Consult: No 
Current Support Network: Family Lives Nearby, Assisted Living([facility]) Confirm Follow Up Transport: Family Plan discussed with Pt/Family/Caregiver: Yes Freedom of Choice Offered: Yes Discharge Location Discharge Placement: Rehab Unit Subacute([facility])

## 2019-11-27 NOTE — PROGRESS NOTES
Orthopedic Progress Note 2019 Admit Date: 2019 Admit Diagnosis: Hip fracture requiring operative repair ([address] Skyler Sat Post Op day: 2 Days Post-Op Subjective:  
 
Kobe Davis Appears to be doing okay Objective:  
 
Vital Signs:   
Temp (24hrs), Av.7 °F (37.1 °C), Min:97.7 °F (36.5 °C), Max:100.2 °F (37.9 °C) LAB:   
[unfilled] Lab Results Component Value Date/Time INR 1.0 2019 04:26 AM  
 INR 1.2 2009 07:20 PM  
 
Lab Results Component Value Date/Time HGB 9.1 (L) 2019 06:43 AM  
 HGB 10.6 (L) 2019 06:10 AM  
 
 
Physical Exam: 
 
No apparent distress No neurovascular issues reported No gross problems noted  
r hip dressing with small amount drainage Plan:  
 
Continue PT/OT rehab as indicated Nursing and SS for disposition plans Mobilize/ dispo. Dressing change Signed By: Beth To MD

## 2019-11-27 NOTE — PROGRESS NOTES
Insurance approval received for pt to transfer to [facility] for STR services. [facility] authorization # [id number] Continued stay review information due to Vicenta Shultz, , on 11/29/19. Pt has been approved for 558 therapy minutes per week. NSG - PDE1 PT/OT - TB 
SLP - SD 
 
SW spoke with pt's dtr, Aditi Moise, via phone, to notify her of the insurance approval and pt's dc to rehab today.

## 2019-11-27 NOTE — PROGRESS NOTES
Problem: Patient Education: Go to Patient Education Activity Goal: Patient/Family Education Description 1. Patient will verbalize and demonstrate understanding of hip precautions with 100% accuracy during ADL. 2. Patient will complete functional transfers with min A and adaptive equipment as needed. 3. Patient will complete lower body bathing and dressing with min A and adaptive equipment as needed. 4. Patient will complete toileting with SBA. 5. Patient will tolerate at least 15 minutes of BUE therapeutic exercises to increase strength in BUE to aid in functional transfers. 6. Patient will tolerate at least 23 minutes of OT treatment with no rest breaks to increase activity tolerance for ADLs. Timeframe: 7 visits Outcome: Progressing Towards Goal 
 
 
OCCUPATIONAL THERAPY: Daily Note and AM 11/27/2019 INPATIENT: OT Visit Days: 2 WBAT in R LE 
Payor: HUMANA MEDICARE / Plan: [facility] HUMANA MEDICARE CHOICE PPO/PFFS / Product Type: Managed Care Medicare /  
  
NAME/AGE/GENDER: Elaine Portillo is a 78 y.o. female PRIMARY DIAGNOSIS:  Hip fracture requiring operative repair [facility]) [S72.009A] Closed fracture of neck of right femur ([facility] [address] Closed fracture of neck of right femur ([facility] [address] Procedure(s) (LRB): 
RIGHT HIP HEMIARTHROPLASTY (Right) 2 Days Post-Op ICD-10: Treatment Diagnosis:  
 · Generalized Muscle Weakness (M62.81) · Difficulty in walking, Not elsewhere classified (R26.2) · History of falling (Z91.81) Precautions/Allergies: 
  Fall precautions Ace inhibitors; Aricept [donepezil]; Dextrose; Gatifloxacin; Levaquin [levofloxacin]; Macrodantin [nitrofurantoin macrocrystalline]; Morphine; Namenda [memantine]; Carmencita; and Sulfa (sulfonamide antibiotics) ASSESSMENT:  
 
Ms. Ralph Callaway presents for R hip fracture, s/p R hemiarthroplasty, 1 day post op. Pt is currently WBAT in R LE. Upon arrival, pt supine in bed with daughter at bedside. 11/27/19 Pt was supine in bed upon arrival. Pt completed bed mobility with mod A. Pt completed ADL with the assistance listed below while sitting EOB. Pt had good sitting balance. Pt completed functional transfer with min A using rolling walker. Pt required verbal cues to complete task due to confusion. Good progress made. Continue POC. This section established at most recent assessment PROBLEM LIST (Impairments causing functional limitations): 1. Decreased Strength 2. Decreased ADL/Functional Activities 3. Decreased Transfer Abilities 4. Decreased Ambulation Ability/Technique 5. Decreased Balance 6. Increased Pain 7. Decreased Activity Tolerance 8. Decreased Flexibility/Joint Mobility 9. Decreased Knowledge of Precautions 10. Decreased Cognition INTERVENTIONS PLANNED: (Benefits and precautions of occupational therapy have been discussed with the patient.) 1. Activities of daily living training 2. Adaptive equipment training 3. Balance training 4. Clothing management 5. Cognitive training 6. Community reintergration 7. Donning&doffing training 8. Neuromuscular re-eduation 9. Re-evaluation 10. Therapeutic activity 11. Therapeutic exercise TREATMENT PLAN: Frequency/Duration: Follow patient 6x/week to address above goals. Rehabilitation Potential For Stated Goals: Good REHAB RECOMMENDATIONS (at time of discharge pending progress):   
Placement: It is my opinion, based on this patient's performance to date, that Ms. Branham may benefit from intensive therapy at a [address] [place] [address] after discharge due to the functional deficits listed above that are likely to improve with skilled rehabilitation and concerns that he/she may be unsafe to be unsupervised at home due to decline in ability to safely perform ADLs and functional mobility. . 
Equipment: ?  TBD   
    
 
 
 
OCCUPATIONAL PROFILE AND HISTORY:  
History of Present Injury/Illness (Reason for Referral): 
See H&P 
 Past Medical History/Comorbidities: Ms. Chana Mims  has a past medical history of Alzheimer's dementia ([facility] [address] Anxiety state, unspecified, CAD (coronary artery disease), Diabetes ([facility] [address] (8/27/2009), GERD (gastroesophageal reflux disease), History of snoring (7/13/2016), History of stroke (10/19/2016), Hypertension, Hypothyroid (8/27/2009), Late onset Alzheimer's disease without behavioral disturbance ([facility] [address] (07/12/2012), Mild dementia ([facility] [address] (8/30/2016), Mixed hyperlipidemia, Osteopenia, Reactive depression (5/16/2016), Retinopathy due to secondary diabetes ([facility] [address] (8/27/2009), and Sensory polyneuropathy (8/30/2016). Ms. Chana Mims  has a past surgical history that includes hx cholecystectomy (1993); hx urological; hx knee arthroscopy; hx heent; hx tonsillectomy (as child); hx other surgical (2008); hx angioplasty (2009); hx other surgical; hx thyroidectomy; hx coronary artery bypass graft (2009); and hx total vaginal hysterectomy. Social History/Living Environment:  
Home Environment: Assisted living Care Facility Name: [facility] # Steps to Enter: 0 One/Two Story Residence: One story Living Alone: No 
Support Systems: Family member(s) Patient Expects to be Discharged to[de-identified] Skilled nursing facility Current DME Used/Available at Home: None Prior Level of Function/Work/Activity: 
Independent with mobility; assist required for bathing and dressing. Personal Factors:   
      Sex:  female Age:  78 y.o. Overall Behavior:  hx of falls Other factors that influence how disability is experienced by the patient:  multiple co-morbidities Number of Personal Factors/Comorbidities that affect the Plan of Care: Brief history (0):  LOW COMPLEXITY ASSESSMENT OF OCCUPATIONAL PERFORMANCE[de-identified]  
Activities of Daily Living:  
Basic ADLs (From Assessment) Complex ADLs (From Assessment) Feeding: Setup Oral Facial Hygiene/Grooming: Setup Bathing: Maximum assistance Upper Body Dressing: Minimum assistance Lower Body Dressing: Maximum assistance Toileting: Moderate assistance Instrumental ADL Meal Preparation: Total assistance Homemaking: Total assistance Grooming/Bathing/Dressing Activities of Daily Living Grooming Washing Face: Supervision Cognitive Retraining Safety/Judgement: Fall prevention Upper Body Bathing Bathing Assistance: Supervision Position Performed: Seated edge of bed Lower Body Bathing Bathing Assistance: Minimum assistance Perineal  : Minimum assistance Lower Body : Minimum assistance Position Performed: Seated edge of bed Adaptive Equipment: Long handled sponge Upper Body Dressing Assistance Dressing Assistance: Supervision [facility] Gown: Supervision Lower Body Dressing Assistance Dressing Assistance: Total assistance(dependent) Socks: Total assistance (dependent) Bed/Mat Mobility Supine to Sit: Moderate assistance Sit to Stand: Minimum assistance Stand to Sit: Minimum assistance Bed to Chair: Minimum assistance Most Recent Physical Functioning:  
Gross Assessment: 
  
         
  
Posture: 
  
Balance: 
Sitting: Intact Standing: With support;Pull to stand Bed Mobility: 
Supine to Sit: Moderate assistance Wheelchair Mobility: 
  
Transfers: 
Sit to Stand: Minimum assistance Stand to Sit: Minimum assistance Bed to Chair: Minimum assistance Patient Vitals for the past 6 hrs: 
 BP BP Patient Position SpO2 Pulse 11/27/19 0747 110/62 At rest 95 % 89  
11/27/19 1121 96/57 Sitting 96 % 79 Mental Status Neurologic State: Alert Orientation Level: Oriented to person, Disoriented to time, Disoriented to situation, Disoriented to place Cognition: Follows commands Perception: Tactile, Verbal 
Perseveration: Tactile cues provided, Verbal cues provided Safety/Judgement: Fall prevention Physical Skills Involved: 
1. Balance 2. Strength 3. Activity Tolerance 4. Gross Motor Control 5. Pain (acute) Cognitive Skills Affected (resulting in the inability to perform in a timely and safe manner): 1. Perception 2. Executive Function 3. Immediate Memory 4. Short Term Recall 5. Long Term Memory Psychosocial Skills Affected: 1. Habits/Routines 2. Environmental Adaptation Number of elements that affect the Plan of Care: 5+:  HIGH COMPLEXITY CLINICAL DECISION MAKIN[address] [place] [address] AM-PAC 6 Clicks Daily Activity Inpatient Short Form How much help from another person does the patient currently need. .. Total A Lot A Little None 1. Putting on and taking off regular lower body clothing? [] 1   [x] 2   [] 3   [] 4  
2. Bathing (including washing, rinsing, drying)? [] 1   [x] 2   [] 3   [] 4  
3. Toileting, which includes using toilet, bedpan or urinal?   [] 1   [x] 2   [] 3   [] 4  
4. Putting on and taking off regular upper body clothing? [] 1   [] 2   [x] 3   [] 4  
5. Taking care of personal grooming such as brushing teeth? [] 1   [] 2   [x] 3   [] 4  
6. Eating meals? [] 1   [] 2   [x] 3   [] 4  
© , Trustees of [address] [place] [address], under license to [employer]. All rights reserved Score:  Initial: 15 Most Recent: X (Date: -- ) Interpretation of Tool:  Represents activities that are increasingly more difficult (i.e. Bed mobility, Transfers, Gait). Medical Necessity:    
· Patient demonstrates · fair ·  rehab potential due to higher previous functional level. Reason for Services/Other Comments: 
· Patient continues to require skilled intervention due to · medical complications and patient unable to attend/participate in therapy as expected · . Use of outcome tool(s) and clinical judgement create a POC that gives a: LOW COMPLEXITY  
 
 
 
TREATMENT:  
(In addition to Assessment/Re-Assessment sessions the following treatments were rendered) Pre-treatment Symptoms/Complaints:   
Pain: Initial:  
Pain Intensity 1: 2 Pain Location 1: Hip Pain Intervention(s) 1: Repositioned /10 Post Session:  same Self Care: (30): Procedure(s) (per grid) utilized to improve and/or restore self-care/home management as related to dressing and bathing. Required min verbal and manual cueing to facilitate activities of daily living skills. Pt required total A to don/doff socks. Braces/Orthotics/Lines/Etc:  
· IV 
· O2 Device: Nasal cannula Treatment/Session Assessment:   
· Response to Treatment:  tolerated well with no issues noted. · Interdisciplinary Collaboration:  
o Certified Occupational Therapy Assistant 
o Registered Nurse · After treatment position/precautions:  
o Up in chair 
o Bed alarm/tab alert on 
o Bed/Chair-wheels locked 
o Call light within reach 
o RN notified · Compliance with Program/Exercises: Will assess as treatment progresses. · Recommendations/Intent for next treatment session: \"Next visit will focus on advancements to more challenging activities and reduction in assistance provided\". Total Treatment Duration: OT Patient Time In/Time Out Time In: 0830 Time Out: 0900 Pham Pfeiffer

## 2019-11-27 NOTE — PROGRESS NOTES
Problem: Mobility Impaired (Adult and Pediatric) Goal: *Acute Goals and Plan of Care (Insert Text) Description ST. Patient will perform bed mobility with MINIMAL ASSISTANCE within 3 days. 2. Patient will transfer bed to chair with MINIMAL ASSISTANCE x2 within 3 days. 3. Patient will demonstrate GOOD DYNAMIC SITTING balance within 3 day(s). 4. Patient will ambulate 25+ using least restrictive assistive device and MINIMAL ASSISTANCE x2 within 3 days. 5. Patient will tolerate 15+ minutes of therapeutic activity/exercise and/or neuromuscular re-education while maintaining stable vitals to improve functional strength and activity tolerance within 3 days. LT. Patient will perform bed mobility with CONTACT GUARD ASSISTANCE within 7 days. 2. Patient will transfer bed to chair with MINIMAL ASSISTANCE x1 within 7 days. 3. Patient will demonstrate FAIR DYNAMIC STANDING balance within 7 day(s). 4. Patient will ambulate 50+ using least restrictive assistive device and MINIMAL ASSISTANCE within 7 days. 5. Patient will tolerate 25+ minutes of therapeutic activity/exercise and/or neuromuscular re-education while maintaining stable vitals to improve functional strength and activity tolerance within 7 days. Outcome: Progressing Towards Goal 
  
 
PHYSICAL THERAPY: Daily Note and AM 2019 INPATIENT: PT Visit Days : 2 Payor: Vasu Ribeiro / Plan: [employer] MEDICARE CHOICE PPO/PFFS / Product Type: Managed Care Medicare /   
 
WBAT R LE Hip Precautions NAME/AGE/GENDER: Lisa Kaiser is a 78 y.o. female PRIMARY DIAGNOSIS: Hip fracture requiring operative repair [facility]) [S72.009A] Closed fracture of neck of right femur ([address] Closed fracture of neck of right femur ([address] Procedure(s) (LRB): 
RIGHT HIP HEMIARTHROPLASTY (Right) 2 Days Post-Op ICD-10: Treatment Diagnosis: · Difficulty in walking, Not elsewhere classified (R26.2) · History of falling (Z91.81) Precaution/Allergies: Ace inhibitors; Aricept [donepezil]; Dextrose; Gatifloxacin; Levaquin [levofloxacin]; Macrodantin [nitrofurantoin macrocrystalline]; Morphine; Namenda [memantine]; Carmencita; and Sulfa (sulfonamide antibiotics) ASSESSMENT:  
 
Ms. Chilango Blevins is a 78year old female admitted with fall and 1 day s/p right hip hemiarthroplasty. Patient is WBAT with posterior hip precautions. Patient seen this AM for initial physical therapy evaluation: presents supine in bed, oriented to person only. and endorses no pain at rest and c/o pain with mobility. History provided by patient's daughter at bedside: Patient lives at an [facility] where she requires assistance with ADLs and ambulates without utilizing an assistive device. Patient with history of recent falls. Patient is in the chair and very pleasant with no pain. She performed a couple leg exercises needing help to follow through with commands. She stood into the walker with min/mod A and walked to the doorway (10 ft or so) 2x with a seated rest.  She has pain with ambulation but steps better with each step. Excellent progress and working toward  Meeting STG's. This section established at most recent assessment PROBLEM LIST (Impairments causing functional limitations): 1. Decreased Strength 2. Decreased ADL/Functional Activities 3. Decreased Transfer Abilities 4. Decreased Ambulation Ability/Technique 5. Decreased Balance 6. Increased Pain 7. Decreased Activity Tolerance 8. Decreased Flexibility/Joint Mobility 9. Decreased Knowledge of Precautions INTERVENTIONS PLANNED: (Benefits and precautions of physical therapy have been discussed with the patient.) 1. Balance Exercise 2. Bed Mobility 3. Family Education 4. Gait Training 5. Home Exercise Program (HEP) 6. Neuromuscular Re-education/Strengthening 7. Range of Motion (ROM) 8. Therapeutic Activites 9. Therapeutic Exercise/Strengthening 10. Transfer Training TREATMENT PLAN: Frequency/Duration: twice daily for duration of hospital stay Rehabilitation Potential For Stated Goals: Good REHAB RECOMMENDATIONS (at time of discharge pending progress):   
Placement: It is my opinion, based on this patient's performance to date, that Ms. Branham may benefit from intensive therapy at a [address] [place] [address] after discharge due to the functional deficits listed above that are likely to improve with skilled rehabilitation and concerns that he/she may be unsafe to be unsupervised at home due to decreased functional mobility and balance/gait status from baseline. Equipment: ? TBD HISTORY:  
History of Present Injury/Illness (Reason for Referral): S/p R hip hemiarthroplasty Past Medical History/Comorbidities: Ms. Cici Tomas  has a past medical history of Alzheimer's dementia ([facility] [address] Anxiety state, unspecified, CAD (coronary artery disease), Diabetes ([address] (8/27/2009), GERD (gastroesophageal reflux disease), History of snoring (7/13/2016), History of stroke (10/19/2016), Hypertension, Hypothyroid (8/27/2009), Late onset Alzheimer's disease without behavioral disturbance ([address] (07/12/2012), Mild dementia ([address] (8/30/2016), Mixed hyperlipidemia, Osteopenia, Reactive depression (5/16/2016), Retinopathy due to secondary diabetes ([address] (8/27/2009), and Sensory polyneuropathy (8/30/2016). Ms. Cici Tomas  has a past surgical history that includes hx cholecystectomy (1993); hx urological; hx knee arthroscopy; hx heent; hx tonsillectomy (as child); hx other surgical (2008); hx angioplasty (2009); hx other surgical; hx thyroidectomy; hx coronary artery bypass graft (2009); and hx total vaginal hysterectomy. Social History/Living Environment:  
Home Environment: Assisted living Care Facility Name: [facility] # Steps to Enter: 0 One/Two Story Residence: One story Living Alone: No 
Support Systems: Family member(s) Patient Expects to be Discharged to[de-identified] Skilled nursing facility Current DME Used/Available at Home: None Prior Level of Function/Work/Activity: 
History provided by patient's daughter at bedside: Patient lives at an [facility] where she requires assistance with ADLs and ambulates without utilizing an assistive device. Patient with history of recent falls. Number of Personal Factors/Comorbidities that affect the Plan of Care: 1-2: MODERATE COMPLEXITY EXAMINATION:  
Most Recent Physical Functioning:  
Gross Assessment: 
  
         
  
Posture: 
  
Balance: 
  Bed Mobility: 
  
Wheelchair Mobility: 
  
Transfers: 
Sit to Stand: Minimum assistance; Moderate assistance Stand to Sit: Minimum assistance Gait: 
  
Speed/Yamilet: Slow Step Length: Left shortened;Right shortened Gait Abnormalities: Antalgic;Decreased step clearance; Step to gait;Trunk sway increased Distance (ft): 10 Feet (ft)(x2 to door ) Assistive Device: Walker, rolling Ambulation - Level of Assistance: Minimal assistance Body Structures Involved: 1. Bones 2. Joints 3. Muscles Body Functions Affected: 1. Neuromusculoskeletal 
2. Movement Related Activities and Participation Affected: 1. Mobility 2. Self Care Number of elements that affect the Plan of Care: 4+: HIGH COMPLEXITY CLINICAL PRESENTATION:  
Presentation: Stable and uncomplicated: LOW COMPLEXITY CLINICAL DECISION MAKIN [facility] [address] [address] AM-PAC 6 Clicks Basic Mobility Inpatient Short Form How much difficulty does the patient currently have. .. Unable A Lot A Little None 1. Turning over in bed (including adjusting bedclothes, sheets and blankets)? [] 1   [] 2   [x] 3   [] 4  
2. Sitting down on and standing up from a chair with arms ( e.g., wheelchair, bedside commode, etc.)   [] 1   [x] 2   [] 3   [] 4  
3. Moving from lying on back to sitting on the side of the bed?    [] 1   [x] 2   [] 3   [] 4  
 How much help from another person does the patient currently need. .. Total A Lot A Little None 4. Moving to and from a bed to a chair (including a wheelchair)? [] 1   [x] 2   [] 3   [] 4  
5. Need to walk in hospital room? [] 1   [x] 2   [] 3   [] 4  
6. Climbing 3-5 steps with a railing? [] 1   [x] 2   [] 3   [] 4  
© 2007, Trustees of [address] [place] [address] [address] [address], under license to [employer]. All rights reserved Score:  Initial: 13 Most Recent: 12 (Date: 11/26/19) Interpretation of Tool:  Represents activities that are increasingly more difficult (i.e. Bed mobility, Transfers, Gait). Medical Necessity:    
· Patient demonstrates · good ·  rehab potential due to higher previous functional level. Reason for Services/Other Comments: 
· Patient continues to require skilled intervention due to · Decreased functional mobility and balance/gait status from baselin · . Use of outcome tool(s) and clinical judgement create a POC that gives a: Clear prediction of patient's progress: LOW COMPLEXITY  
  
 
 
 
TREATMENT:  
(In addition to Assessment/Re-Assessment sessions the following treatments were rendered) Pre-treatment Symptoms/Complaints:  \"hello how are you\" Pain: Initial:  
Pain Intensity 1: 2  Post Session:  0/10 at rest in bedside chair; endorses comfort and FLACC 0 Therapeutic Activity: (    10 minutes): Therapeutic activities including Chair transfers and a few seated exercises to improve mobility, strength and balance. Required minimal assist and walker to promote static and dynamic balance in standing. Date: 
11/27/19 Date: 
 Date: Activity/Exercise Parameters Parameters Parameters Seated TKE 5x2 B Seated marcing 5x2 B AAR Gait Training (  15 minutes):  Gait training to improve and/or restore physical functioning as related to mobility, strength, balance and coordination. Ambulated 10 Feet (ft)(x2 to door ) with Minimal assistance using a Walker, rolling and moderate   related to their stride length and walker technique to promote proper body mechanics. Braces/Orthotics/Lines/Etc:  
· O2 Device: Nasal cannula Treatment/Session Assessment:   
· Response to Treatment:  Patient has some difficulty following more complicated directions. · Interdisciplinary Collaboration:  
o Physical Therapy Assistant 
o Registered Nurse 
o Rehabilitation Attendant · After treatment position/precautions:  
o Up in chair 
o Bed alarm/tab alert on 
o Bed/Chair-wheels locked 
o Bed in low position 
o Call light within reach 
o RN notified · Compliance with Program/Exercises: Compliant all of the time · Recommendations/Intent for next treatment session: \"Next visit will focus on advancements to more challenging activities and reduction in assistance provided\". Total Treatment Duration: PT Patient Time In/Time Out Time In: 7882 Time Out: 1005 Halima Choi, PTA

## 2019-11-27 NOTE — DISCHARGE SUMMARY
Physician Discharge Summary Patient ID: 
Ivette Bradley [id number] 
24 y.o. 
1940 Admit date: 11/25/2019 Discharge date: 11- Diagnosis: 
1- Right hip fracture due to mechanical fall, s/p hemiarthroplasty, pain controlled. To continue Lovenox prophylaxis as ordered. 2- Hypertension and NIDDM, controlled 3- Coronary artery disease, stable 4- Alzheimer dementia Hospital course:  
79 y/o F with dementia, [facility] resident, CAD/CABG x3, hx CVA, DM, who presented to ER after fall at home. She was getting up around 2am to adjust the heat when she fell on her R side, landed on her R hip with severe sharp pain following. No head trauma/LOC. Unable to bear weight. No preceding symptoms. She managed to get to her door and open it; staff desk is right across from her room and she was brought to ER Patient admitted and treated as above. On day of discharge, patient exam showed normal exam and pt was asymptomatic. PCP: Damian Zayas MD 
 
Patient Instructions:  
Current Discharge Medication List  
  
START taking these medications Details  
enoxaparin (LOVENOX) 40 mg/0.4 mL 0.4 mL by SubCUTAneous route daily. Qty: 15 Syringe, Refills: 0  
  
calcium-vitamin D (OYSTER SHELL) 500 mg(1,250mg) -200 unit per tablet Take 1 Tab by mouth three (3) times daily (with meals). Qty: 270 Tab, Refills: 0  
  
oxyCODONE IR (ROXICODONE) 5 mg immediate release tablet Take 1 Tab by mouth every six (6) hours as needed for Pain for up to 7 days. Max Daily Amount: 20 mg. 
Qty: 20 Tab, Refills: 0 Associated Diagnoses: Closed right hip fracture, initial encounter ([facility] [address] CONTINUE these medications which have NOT CHANGED Details  
galantamine (RAZADYNE) 4 mg tablet TAKE 1 TAB BY MOUTH TWO (2) TIMES A DAY. Qty: 180 Tab, Refills: 2 Associated Diagnoses: Late onset Alzheimer's disease without behavioral disturbance ([facility] [address] levothyroxine (SYNTHROID) 100 mcg tablet Take 1 Tab by mouth Daily (before breakfast). Qty: 90 Tab, Refills: 3 Associated Diagnoses: Hypothyroidism, adult  
  
metFORMIN ER (GLUCOPHAGE XR) 500 mg tablet Take 1 Tab by mouth two (2) times a day. Qty: 180 Tab, Refills: 1 Associated Diagnoses: Controlled type 2 diabetes mellitus with complication, without long-term current use of insulin (HCC)  
  
citalopram (CELEXA) 20 mg tablet Take 1 Tab by mouth daily. Qty: 90 Tab, Refills: 3 Associated Diagnoses: Anxiety state  
  
simvastatin (ZOCOR) 40 mg tablet Take 1 Tab by mouth nightly. Qty: 90 Tab, Refills: 3 Associated Diagnoses: Coronary artery disease involving native coronary artery of native heart without angina pectoris  
  
omeprazole (PRILOSEC) 20 mg capsule Take 1 Cap by mouth daily. Qty: 90 Cap, Refills: 3 Associated Diagnoses: Gastroesophageal reflux disease without esophagitis  
  
metoprolol tartrate (LOPRESSOR) 50 mg tablet Take 1 Tab by mouth two (2) times a day. Qty: 180 Tab, Refills: 3 Associated Diagnoses: Coronary artery disease involving native coronary artery of native heart without angina pectoris  
  
clopidogrel (PLAVIX) 75 mg tab Take 1 Tab by mouth daily. Qty: 90 Tab, Refills: 3 Associated Diagnoses: Coronary artery disease involving native coronary artery of native heart without angina pectoris buPROPion XL (WELLBUTRIN XL) 150 mg tablet Take 1 Tab by mouth every morning. Qty: 90 Tab, Refills: 1 Associated Diagnoses: Reactive depression  
  
ondansetron (ZOFRAN ODT) 4 mg disintegrating tablet Take 1 Tab by mouth every eight (8) hours as needed for Nausea. Qty: 30 Tab, Refills: 0  
  
diclofenac (VOLTAREN) 1 % gel Apply 4 g to affected area four (4) times daily. Qty: 250 g, Refills: 5 Associated Diagnoses: Pain of left hip joint  
  
prednisoLONE acetate (PRED FORTE) 1 % ophthalmic suspension Administer 1 Drop to both eyes four (4) times daily. isosorbide mononitrate ER (IMDUR) 60 mg CR tablet Take  by mouth every morning. aspirin (ASPIRIN) 325 mg tablet Take 1 Tab by mouth daily. Qty: 1 Tab, Refills: 0 Instructions:  
 
Rehab MD in 1-3 days Diet:  Low salt, low fat, diabetic. Activity: As tolerated. PT/ OT recommendations. Fall precautions. Condition: Stable Time 20 min Please send copy to primary physician.  
 
Signed: 
Melony Phillips MD 
11/27/2019 
12:27 PM

## 2019-11-27 NOTE — PROGRESS NOTES
Problem: Falls - Risk of 
Goal: *Absence of Falls Description Document Haroon Andrews Fall Risk and appropriate interventions in the flowsheet. Outcome: Progressing Towards Goal 
Note: Fall Risk Interventions: 
Mobility Interventions: Bed/chair exit alarm, Communicate number of staff needed for ambulation/transfer, Patient to call before getting OOB Mentation Interventions: Bed/chair exit alarm, Door open when patient unattended, Family/sitter at bedside, More frequent rounding, Increase mobility, Reorient patient, Room close to nurse's station, Toileting rounds, Update white board Medication Interventions: Bed/chair exit alarm, Patient to call before getting OOB Elimination Interventions: Bed/chair exit alarm, Call light in reach, Patient to call for help with toileting needs History of Falls Interventions: Bed/chair exit alarm, Door open when patient unattended Problem: Patient Education: Go to Patient Education Activity Goal: Patient/Family Education Outcome: Progressing Towards Goal 
  
Problem: Pressure Injury - Risk of 
Goal: *Prevention of pressure injury Description Document Mack Scale and appropriate interventions in the flowsheet. Outcome: Progressing Towards Goal 
Note: Pressure Injury Interventions: 
Sensory Interventions: Assess changes in LOC Moisture Interventions: Apply protective barrier, creams and emollients, Absorbent underpads, Limit adult briefs, Maintain skin hydration (lotion/cream), Minimize layers, Moisture barrier, Offer toileting Q_hr Activity Interventions: Increase time out of bed, Pressure redistribution bed/mattress(bed type), PT/OT evaluation Mobility Interventions: HOB 30 degrees or less, Pressure redistribution bed/mattress (bed type), PT/OT evaluation Nutrition Interventions: Document food/fluid/supplement intake Problem: Patient Education: Go to Patient Education Activity Goal: Patient/Family Education Outcome: Progressing Towards Goal 
  
Problem: Patient Education: Go to Patient Education Activity Goal: Patient/Family Education Outcome: Progressing Towards Goal

## 2019-12-31 ENCOUNTER — APPOINTMENT (OUTPATIENT)
Dept: CT IMAGING | Age: 79
DRG: 083 | End: 2019-12-31
Attending: EMERGENCY MEDICINE
Payer: MEDICARE

## 2019-12-31 ENCOUNTER — HOSPITAL ENCOUNTER (EMERGENCY)
Age: 79
Discharge: NURSING, REHAB OR LONG-TERM CARE | DRG: 083 | End: 2020-01-01
Attending: EMERGENCY MEDICINE
Payer: MEDICARE

## 2019-12-31 DIAGNOSIS — S06.6X0A SUBARACHNOID HEMORRHAGE FOLLOWING INJURY, NO LOSS OF CONSCIOUSNESS, INITIAL ENCOUNTER (HCC): Primary | ICD-10-CM

## 2019-12-31 DIAGNOSIS — F03.90 DEMENTIA WITHOUT BEHAVIORAL DISTURBANCE, UNSPECIFIED DEMENTIA TYPE: ICD-10-CM

## 2019-12-31 PROBLEM — I60.9 SUBARACHNOID HEMORRHAGE (HCC): Status: ACTIVE | Noted: 2019-12-31

## 2019-12-31 PROBLEM — I63.9 CVA (CEREBRAL VASCULAR ACCIDENT) (HCC): Chronic | Status: ACTIVE | Noted: 2019-12-31

## 2019-12-31 PROBLEM — S01.81XA FACIAL LACERATION: Status: ACTIVE | Noted: 2019-12-31

## 2019-12-31 PROBLEM — D64.9 ANEMIA: Chronic | Status: ACTIVE | Noted: 2019-12-31

## 2019-12-31 LAB
ALBUMIN SERPL-MCNC: 3.6 G/DL (ref 3.2–4.6)
ALBUMIN/GLOB SERPL: 1.1 {RATIO} (ref 1.2–3.5)
ALP SERPL-CCNC: 110 U/L (ref 50–136)
ALT SERPL-CCNC: 8 U/L (ref 12–65)
ANION GAP SERPL CALC-SCNC: 7 MMOL/L (ref 7–16)
AST SERPL-CCNC: 10 U/L (ref 15–37)
BASOPHILS # BLD: 0 K/UL (ref 0–0.2)
BASOPHILS NFR BLD: 0 % (ref 0–2)
BILIRUB SERPL-MCNC: 0.2 MG/DL (ref 0.2–1.1)
BUN SERPL-MCNC: 30 MG/DL (ref 8–23)
CALCIUM SERPL-MCNC: 10.1 MG/DL (ref 8.3–10.4)
CHLORIDE SERPL-SCNC: 100 MMOL/L (ref 98–107)
CO2 SERPL-SCNC: 28 MMOL/L (ref 21–32)
CREAT SERPL-MCNC: 1.51 MG/DL (ref 0.6–1)
DIFFERENTIAL METHOD BLD: ABNORMAL
EOSINOPHIL # BLD: 0.2 K/UL (ref 0–0.8)
EOSINOPHIL NFR BLD: 2 % (ref 0.5–7.8)
ERYTHROCYTE [DISTWIDTH] IN BLOOD BY AUTOMATED COUNT: 12.8 % (ref 11.9–14.6)
GLOBULIN SER CALC-MCNC: 3.4 G/DL (ref 2.3–3.5)
GLUCOSE SERPL-MCNC: 177 MG/DL (ref 65–100)
HCT VFR BLD AUTO: 35.9 % (ref 35.8–46.3)
HGB BLD-MCNC: 11.3 G/DL (ref 11.7–15.4)
IMM GRANULOCYTES # BLD AUTO: 0 K/UL (ref 0–0.5)
IMM GRANULOCYTES NFR BLD AUTO: 0 % (ref 0–5)
LYMPHOCYTES # BLD: 1.9 K/UL (ref 0.5–4.6)
LYMPHOCYTES NFR BLD: 16 % (ref 13–44)
MCH RBC QN AUTO: 28.9 PG (ref 26.1–32.9)
MCHC RBC AUTO-ENTMCNC: 31.5 G/DL (ref 31.4–35)
MCV RBC AUTO: 91.8 FL (ref 79.6–97.8)
MONOCYTES # BLD: 1.5 K/UL (ref 0.1–1.3)
MONOCYTES NFR BLD: 12 % (ref 4–12)
NEUTS SEG # BLD: 8.6 K/UL (ref 1.7–8.2)
NEUTS SEG NFR BLD: 70 % (ref 43–78)
NRBC # BLD: 0 K/UL (ref 0–0.2)
PLATELET # BLD AUTO: 316 K/UL (ref 150–450)
PMV BLD AUTO: 10.2 FL (ref 9.4–12.3)
POTASSIUM SERPL-SCNC: 4.3 MMOL/L (ref 3.5–5.1)
PROT SERPL-MCNC: 7 G/DL (ref 6.3–8.2)
RBC # BLD AUTO: 3.91 M/UL (ref 4.05–5.2)
SODIUM SERPL-SCNC: 135 MMOL/L (ref 136–145)
WBC # BLD AUTO: 12.2 K/UL (ref 4.3–11.1)

## 2019-12-31 PROCEDURE — 90471 IMMUNIZATION ADMIN: CPT | Performed by: EMERGENCY MEDICINE

## 2019-12-31 PROCEDURE — 90715 TDAP VACCINE 7 YRS/> IM: CPT | Performed by: EMERGENCY MEDICINE

## 2019-12-31 PROCEDURE — 99285 EMERGENCY DEPT VISIT HI MDM: CPT | Performed by: EMERGENCY MEDICINE

## 2019-12-31 PROCEDURE — 77030031139 HC SUT VCRL2 J&J -A

## 2019-12-31 PROCEDURE — 75810000293 HC SIMP/SUPERF WND  RPR: Performed by: EMERGENCY MEDICINE

## 2019-12-31 PROCEDURE — 81015 MICROSCOPIC EXAM OF URINE: CPT

## 2019-12-31 PROCEDURE — 0HQ1XZZ REPAIR FACE SKIN, EXTERNAL APPROACH: ICD-10-PCS | Performed by: EMERGENCY MEDICINE

## 2019-12-31 PROCEDURE — 81003 URINALYSIS AUTO W/O SCOPE: CPT | Performed by: EMERGENCY MEDICINE

## 2019-12-31 PROCEDURE — 70450 CT HEAD/BRAIN W/O DYE: CPT

## 2019-12-31 PROCEDURE — 74011250636 HC RX REV CODE- 250/636: Performed by: EMERGENCY MEDICINE

## 2019-12-31 PROCEDURE — 93005 ELECTROCARDIOGRAM TRACING: CPT | Performed by: EMERGENCY MEDICINE

## 2019-12-31 PROCEDURE — 85025 COMPLETE CBC W/AUTO DIFF WBC: CPT

## 2019-12-31 PROCEDURE — 80053 COMPREHEN METABOLIC PANEL: CPT

## 2019-12-31 RX ORDER — SODIUM CHLORIDE 0.9 % (FLUSH) 0.9 %
5-40 SYRINGE (ML) INJECTION AS NEEDED
Status: DISCONTINUED | OUTPATIENT
Start: 2019-12-31 | End: 2020-01-01 | Stop reason: HOSPADM

## 2019-12-31 RX ORDER — SODIUM CHLORIDE 0.9 % (FLUSH) 0.9 %
10 SYRINGE (ML) INJECTION
Status: DISCONTINUED | OUTPATIENT
Start: 2019-12-31 | End: 2020-01-01 | Stop reason: HOSPADM

## 2019-12-31 RX ORDER — SODIUM CHLORIDE 0.9 % (FLUSH) 0.9 %
5-40 SYRINGE (ML) INJECTION EVERY 8 HOURS
Status: DISCONTINUED | OUTPATIENT
Start: 2019-12-31 | End: 2020-01-01 | Stop reason: HOSPADM

## 2019-12-31 RX ADMIN — TETANUS TOXOID, REDUCED DIPHTHERIA TOXOID AND ACELLULAR PERTUSSIS VACCINE, ADSORBED 0.5 ML: 5; 2.5; 8; 8; 2.5 SUSPENSION INTRAMUSCULAR at 21:56

## 2020-01-01 ENCOUNTER — APPOINTMENT (OUTPATIENT)
Dept: CT IMAGING | Age: 80
DRG: 083 | End: 2020-01-01
Attending: INTERNAL MEDICINE
Payer: MEDICARE

## 2020-01-01 ENCOUNTER — APPOINTMENT (OUTPATIENT)
Dept: CT IMAGING | Age: 80
DRG: 083 | End: 2020-01-01
Attending: FAMILY MEDICINE
Payer: MEDICARE

## 2020-01-01 ENCOUNTER — HOSPITAL ENCOUNTER (INPATIENT)
Age: 80
LOS: 2 days | Discharge: HOME | DRG: 083 | End: 2020-01-03
Attending: FAMILY MEDICINE | Admitting: INTERNAL MEDICINE
Payer: MEDICARE

## 2020-01-01 VITALS
SYSTOLIC BLOOD PRESSURE: 107 MMHG | WEIGHT: 128 LBS | DIASTOLIC BLOOD PRESSURE: 52 MMHG | RESPIRATION RATE: 16 BRPM | BODY MASS INDEX: 22.67 KG/M2 | OXYGEN SATURATION: 96 % | TEMPERATURE: 97.8 F | HEART RATE: 79 BPM

## 2020-01-01 PROBLEM — N17.9 AKI (ACUTE KIDNEY INJURY) (HCC): Status: ACTIVE | Noted: 2020-01-01

## 2020-01-01 PROBLEM — N39.0 UTI (URINARY TRACT INFECTION): Status: ACTIVE | Noted: 2020-01-01

## 2020-01-01 PROBLEM — S06.6XAA SUBARACHNOID HEMATOMA: Status: ACTIVE | Noted: 2020-01-01

## 2020-01-01 LAB
ATRIAL RATE: 78 BPM
BACTERIA URNS QL MICRO: ABNORMAL /HPF
CALCULATED P AXIS, ECG09: 81 DEGREES
CALCULATED R AXIS, ECG10: -32 DEGREES
CALCULATED T AXIS, ECG11: 83 DEGREES
CASTS URNS QL MICRO: ABNORMAL /LPF
DIAGNOSIS, 93000: NORMAL
EPI CELLS #/AREA URNS HPF: 0 /HPF
GLUCOSE BLD STRIP.AUTO-MCNC: 112 MG/DL (ref 65–100)
GLUCOSE BLD STRIP.AUTO-MCNC: 112 MG/DL (ref 65–100)
GLUCOSE BLD STRIP.AUTO-MCNC: 130 MG/DL (ref 65–100)
GLUCOSE BLD STRIP.AUTO-MCNC: 149 MG/DL (ref 65–100)
GLUCOSE BLD STRIP.AUTO-MCNC: 175 MG/DL (ref 65–100)
P-R INTERVAL, ECG05: 182 MS
Q-T INTERVAL, ECG07: 354 MS
QRS DURATION, ECG06: 82 MS
QTC CALCULATION (BEZET), ECG08: 403 MS
RBC #/AREA URNS HPF: ABNORMAL /HPF
VENTRICULAR RATE, ECG03: 78 BPM
WBC URNS QL MICRO: >100 /HPF

## 2020-01-01 PROCEDURE — 74011250636 HC RX REV CODE- 250/636: Performed by: FAMILY MEDICINE

## 2020-01-01 PROCEDURE — 97161 PT EVAL LOW COMPLEX 20 MIN: CPT

## 2020-01-01 PROCEDURE — 70450 CT HEAD/BRAIN W/O DYE: CPT

## 2020-01-01 PROCEDURE — 86580 TB INTRADERMAL TEST: CPT | Performed by: INTERNAL MEDICINE

## 2020-01-01 PROCEDURE — 65610000001 HC ROOM ICU GENERAL

## 2020-01-01 PROCEDURE — 74011636637 HC RX REV CODE- 636/637: Performed by: INTERNAL MEDICINE

## 2020-01-01 PROCEDURE — 70496 CT ANGIOGRAPHY HEAD: CPT

## 2020-01-01 PROCEDURE — 74011636320 HC RX REV CODE- 636/320: Performed by: FAMILY MEDICINE

## 2020-01-01 PROCEDURE — 74011000302 HC RX REV CODE- 302: Performed by: INTERNAL MEDICINE

## 2020-01-01 PROCEDURE — 74011000250 HC RX REV CODE- 250: Performed by: INTERNAL MEDICINE

## 2020-01-01 PROCEDURE — 97530 THERAPEUTIC ACTIVITIES: CPT

## 2020-01-01 PROCEDURE — 77030020263 HC SOL INJ SOD CL0.9% LFCR 1000ML

## 2020-01-01 PROCEDURE — 74011250637 HC RX REV CODE- 250/637: Performed by: INTERNAL MEDICINE

## 2020-01-01 PROCEDURE — 74011000258 HC RX REV CODE- 258: Performed by: INTERNAL MEDICINE

## 2020-01-01 PROCEDURE — 74011250636 HC RX REV CODE- 250/636: Performed by: INTERNAL MEDICINE

## 2020-01-01 PROCEDURE — 77030040361 HC SLV COMPR DVT MDII -B

## 2020-01-01 PROCEDURE — 77030038269 HC DRN EXT URIN PURWCK BARD -A

## 2020-01-01 PROCEDURE — 82962 GLUCOSE BLOOD TEST: CPT

## 2020-01-01 RX ORDER — SODIUM CHLORIDE 0.9 % (FLUSH) 0.9 %
10 SYRINGE (ML) INJECTION
Status: COMPLETED | OUTPATIENT
Start: 2020-01-01 | End: 2020-01-01

## 2020-01-01 RX ORDER — PREDNISOLONE ACETATE 10 MG/ML
1 SUSPENSION/ DROPS OPHTHALMIC 4 TIMES DAILY
Status: DISCONTINUED | OUTPATIENT
Start: 2020-01-01 | End: 2020-01-03 | Stop reason: HOSPADM

## 2020-01-01 RX ORDER — SODIUM CHLORIDE 0.9 % (FLUSH) 0.9 %
5-40 SYRINGE (ML) INJECTION EVERY 8 HOURS
Status: DISCONTINUED | OUTPATIENT
Start: 2020-01-01 | End: 2020-01-03 | Stop reason: HOSPADM

## 2020-01-01 RX ORDER — BUPROPION HYDROCHLORIDE 150 MG/1
150 TABLET, EXTENDED RELEASE ORAL
Status: DISCONTINUED | OUTPATIENT
Start: 2020-01-01 | End: 2020-01-03 | Stop reason: HOSPADM

## 2020-01-01 RX ORDER — ONDANSETRON 2 MG/ML
4 INJECTION INTRAMUSCULAR; INTRAVENOUS
Status: DISCONTINUED | OUTPATIENT
Start: 2020-01-01 | End: 2020-01-03 | Stop reason: HOSPADM

## 2020-01-01 RX ORDER — ACETAMINOPHEN 325 MG/1
650 TABLET ORAL
Status: DISCONTINUED | OUTPATIENT
Start: 2020-01-01 | End: 2020-01-03 | Stop reason: HOSPADM

## 2020-01-01 RX ORDER — LEVOTHYROXINE SODIUM 100 UG/1
100 TABLET ORAL
Status: DISCONTINUED | OUTPATIENT
Start: 2020-01-01 | End: 2020-01-03 | Stop reason: HOSPADM

## 2020-01-01 RX ORDER — PANTOPRAZOLE SODIUM 40 MG/1
40 TABLET, DELAYED RELEASE ORAL
Status: DISCONTINUED | OUTPATIENT
Start: 2020-01-01 | End: 2020-01-03 | Stop reason: HOSPADM

## 2020-01-01 RX ORDER — SODIUM CHLORIDE 0.9 % (FLUSH) 0.9 %
5-40 SYRINGE (ML) INJECTION AS NEEDED
Status: DISCONTINUED | OUTPATIENT
Start: 2020-01-01 | End: 2020-01-03 | Stop reason: HOSPADM

## 2020-01-01 RX ORDER — ISOSORBIDE MONONITRATE 30 MG/1
60 TABLET, EXTENDED RELEASE ORAL
Status: DISCONTINUED | OUTPATIENT
Start: 2020-01-01 | End: 2020-01-03 | Stop reason: HOSPADM

## 2020-01-01 RX ORDER — INSULIN LISPRO 100 [IU]/ML
INJECTION, SOLUTION INTRAVENOUS; SUBCUTANEOUS
Status: DISCONTINUED | OUTPATIENT
Start: 2020-01-01 | End: 2020-01-03 | Stop reason: HOSPADM

## 2020-01-01 RX ORDER — CITALOPRAM 20 MG/1
20 TABLET, FILM COATED ORAL DAILY
Status: DISCONTINUED | OUTPATIENT
Start: 2020-01-01 | End: 2020-01-03 | Stop reason: HOSPADM

## 2020-01-01 RX ORDER — SIMVASTATIN 20 MG/1
40 TABLET, FILM COATED ORAL
Status: DISCONTINUED | OUTPATIENT
Start: 2020-01-01 | End: 2020-01-03 | Stop reason: HOSPADM

## 2020-01-01 RX ORDER — METOPROLOL TARTRATE 50 MG/1
50 TABLET ORAL 2 TIMES DAILY
Status: DISCONTINUED | OUTPATIENT
Start: 2020-01-01 | End: 2020-01-03 | Stop reason: HOSPADM

## 2020-01-01 RX ORDER — GALANTAMINE 4 MG/1
4 TABLET, FILM COATED ORAL 2 TIMES DAILY WITH MEALS
Status: DISCONTINUED | OUTPATIENT
Start: 2020-01-01 | End: 2020-01-03 | Stop reason: HOSPADM

## 2020-01-01 RX ORDER — SODIUM CHLORIDE 9 MG/ML
3 INJECTION, SOLUTION INTRAVENOUS ONCE
Status: COMPLETED | OUTPATIENT
Start: 2020-01-01 | End: 2020-01-01

## 2020-01-01 RX ORDER — SODIUM CHLORIDE 9 MG/ML
75 INJECTION, SOLUTION INTRAVENOUS CONTINUOUS
Status: DISCONTINUED | OUTPATIENT
Start: 2020-01-01 | End: 2020-01-02

## 2020-01-01 RX ORDER — SODIUM CHLORIDE 9 MG/ML
1 INJECTION, SOLUTION INTRAVENOUS AS NEEDED
Status: DISPENSED | OUTPATIENT
Start: 2020-01-01 | End: 2020-01-02

## 2020-01-01 RX ADMIN — METOPROLOL TARTRATE 50 MG: 50 TABLET, FILM COATED ORAL at 18:01

## 2020-01-01 RX ADMIN — PREDNISOLONE ACETATE 1 DROP: 10 SUSPENSION/ DROPS OPHTHALMIC at 08:25

## 2020-01-01 RX ADMIN — IOPAMIDOL 50 ML: 755 INJECTION, SOLUTION INTRAVENOUS at 05:20

## 2020-01-01 RX ADMIN — GALANTAMINE 4 MG: 4 TABLET, FILM COATED ORAL at 09:19

## 2020-01-01 RX ADMIN — ACETAMINOPHEN 650 MG: 325 TABLET, FILM COATED ORAL at 14:52

## 2020-01-01 RX ADMIN — SIMVASTATIN 40 MG: 20 TABLET, FILM COATED ORAL at 02:06

## 2020-01-01 RX ADMIN — SODIUM CHLORIDE 3 ML/KG/HR: 900 INJECTION, SOLUTION INTRAVENOUS at 02:04

## 2020-01-01 RX ADMIN — Medication 10 ML: at 13:41

## 2020-01-01 RX ADMIN — PANTOPRAZOLE SODIUM 40 MG: 40 TABLET, DELAYED RELEASE ORAL at 08:16

## 2020-01-01 RX ADMIN — SODIUM CHLORIDE 75 ML/HR: 900 INJECTION, SOLUTION INTRAVENOUS at 13:38

## 2020-01-01 RX ADMIN — Medication 10 ML: at 06:00

## 2020-01-01 RX ADMIN — TUBERCULIN PURIFIED PROTEIN DERIVATIVE 5 UNITS: 5 INJECTION, SOLUTION INTRADERMAL at 02:06

## 2020-01-01 RX ADMIN — Medication 10 ML: at 06:13

## 2020-01-01 RX ADMIN — PREDNISOLONE ACETATE 1 DROP: 10 SUSPENSION/ DROPS OPHTHALMIC at 18:01

## 2020-01-01 RX ADMIN — GALANTAMINE 4 MG: 4 TABLET, FILM COATED ORAL at 18:00

## 2020-01-01 RX ADMIN — PREDNISOLONE ACETATE 1 DROP: 10 SUSPENSION/ DROPS OPHTHALMIC at 22:50

## 2020-01-01 RX ADMIN — Medication 10 ML: at 02:08

## 2020-01-01 RX ADMIN — ISOSORBIDE MONONITRATE 60 MG: 30 TABLET, EXTENDED RELEASE ORAL at 08:19

## 2020-01-01 RX ADMIN — CEFTRIAXONE 1 G: 1 INJECTION, POWDER, FOR SOLUTION INTRAMUSCULAR; INTRAVENOUS at 02:06

## 2020-01-01 RX ADMIN — METOPROLOL TARTRATE 50 MG: 50 TABLET, FILM COATED ORAL at 09:19

## 2020-01-01 RX ADMIN — Medication 10 ML: at 22:50

## 2020-01-01 RX ADMIN — SIMVASTATIN 40 MG: 20 TABLET, FILM COATED ORAL at 22:50

## 2020-01-01 RX ADMIN — INSULIN LISPRO 2 UNITS: 100 INJECTION, SOLUTION INTRAVENOUS; SUBCUTANEOUS at 22:59

## 2020-01-01 RX ADMIN — PREDNISOLONE ACETATE 1 DROP: 10 SUSPENSION/ DROPS OPHTHALMIC at 14:04

## 2020-01-01 RX ADMIN — BUPROPION HYDROCHLORIDE 150 MG: 150 TABLET, EXTENDED RELEASE ORAL at 08:16

## 2020-01-01 RX ADMIN — CITALOPRAM HYDROBROMIDE 20 MG: 20 TABLET ORAL at 08:16

## 2020-01-01 RX ADMIN — LEVOTHYROXINE SODIUM 100 MCG: 100 TABLET ORAL at 08:16

## 2020-01-01 RX ADMIN — SODIUM CHLORIDE 75 ML/HR: 900 INJECTION, SOLUTION INTRAVENOUS at 01:00

## 2020-01-01 NOTE — ED TRIAGE NOTES
Pt from [facility], presents by EMS after fall from standing and laceration to R temple that is actively bleeding. Pt on Plavix. No neuro deficits noted.

## 2020-01-01 NOTE — CONSULTS
LEAPFROG PROTOCOL NOTE    Marycruz Harden  1/1/2020    The patient is currently in the critical care setting managed by Dr. Levorn Ahumada and neurosurgery with [facility]. The patient's chart is reviewed and the patient is discussed with the staff. Patient is currently hemodynamically stable. Patient has no needs identified for Intensivist management in the critical care setting at this time. Please notify us if can be of assistance. No charge billed to the patient. Thank you.     Sylvia Sousa NP

## 2020-01-01 NOTE — PROGRESS NOTES
Hospitalist Note     Admit Date:  2020 12:42 AM   Name:  Andre Lord   Age:  78 y.o.  :  1940   MRN:  [id number]   PCP:  Yessy Persaud MD  Treatment Team: Attending Provider: Lona Carrel, MD; Physical Therapist: Nils Bhatia, PT, DPT    HPI/Subjective:   Ms. Lucy Molina is a nice 79 y/o WF with a h/o dementia, CAD s/p CABG (on ASA/Plavix), CVA, HTN, DM2 who was presented to [facility] on  with a fall, CT showed left SAH. NS contacted and requested CTA. Hospitalist admitted and transferred to [facility]. : Pleasant, no issues this morning, doesn't exactly know why she's here. Moves all extremities. Repeat CT unchanged. ROS neg otherwise. No other complaints  Objective:     Patient Vitals for the past 24 hrs:   Temp Pulse Resp BP SpO2   20 0815  79 20 128/57 97 %   20 0759  84 25 105/54 98 %   20 0745  80  115/58 96 %   20 0744   16     20 0730  (!) 102  128/66    20 0715  98 19 112/63 98 %   20 0706  78 23 109/67 97 %   20 0700 98.8 °F (37.1 °C) 78 22 109/67 95 %   20 0630  86 22 117/56 95 %   20 0615  86 20 117/56 97 %   20 0604  82 22 135/61 99 %   20 0600  81 20 135/61 99 %   20 0546  80 19 138/90 97 %   20 0516  81 19 128/58 98 %   20 0400  79 20 119/58 100 %   20 0300 98.7 °F (37.1 °C) 86 21 108/83 97 %   20 0201  80 20 126/58 98 %   20 0130  74 30 117/57 97 %   20 0116  78 29 112/51 97 %   20 0100 98.6 °F (37 °C) 77 15 116/59 97 %   20 0053  77  136/62 98 %     Oxygen Therapy  O2 Sat (%): 97 % (20)  Pulse via Oximetry: 80 beats per minute (20)    Estimated body mass index is 22.67 kg/m² as calculated from the following:    Height as of 19: 5' 3\" (1.6 m). Weight as of 19: 58.1 kg (128 lb).       Intake/Output Summary (Last 24 hours) at 2020 0839  Last data filed at 2020 0643  Gross per 24 hour   Intake    Output 300 ml   Net -300 ml       *Note that automatically entered I/Os may not be accurate; dependent on patient compliance with collection and accurate  by techs. General:    Well nourished. Alert. Confused. Eyes:  Right periorbital ecchymosis. CV:   RRR. No murmur, rub, or gallop. Lungs:   CTAB. No wheezing, rhonchi, or rales. Abdomen:   Soft, nontender, nondistended. Extremities: Warm and dry. No cyanosis or edema. Skin:     No rashes or jaundice. Neuro:  No gross focal deficits. Confused. Data Review:  I have reviewed all labs, meds, and studies from the last 24 hours:    Recent Results (from the past 24 hour(s))   EKG, 12 LEAD, INITIAL    Collection Time: 12/31/19 11:10 PM   Result Value Ref Range    Ventricular Rate 78 BPM    Atrial Rate 78 BPM    P-R Interval 182 ms    QRS Duration 82 ms    Q-T Interval 354 ms    QTC Calculation (Bezet) 403 ms    Calculated P Axis 81 degrees    Calculated R Axis -32 degrees    Calculated T Axis 83 degrees    Diagnosis       !! AGE AND GENDER SPECIFIC ECG ANALYSIS !!   Sinus rhythm with occasional Premature ventricular complexes  Left axis deviation  Nonspecific T wave abnormality  Abnormal ECG  When compared with ECG of 25-NOV-2019 04:23,  Premature ventricular complexes are now Present  Nonspecific T wave abnormality now evident in Inferior leads  Nonspecific T wave abnormality now evident in Anterolateral leads  Confirmed by Samuel Hernandez ([id number]) on 1/1/2020 7:34:39 AM     CBC WITH AUTOMATED DIFF    Collection Time: 12/31/19 11:19 PM   Result Value Ref Range    WBC 12.2 (H) 4.3 - 11.1 K/uL    RBC 3.91 (L) 4.05 - 5.2 M/uL    HGB 11.3 (L) 11.7 - 15.4 g/dL    HCT 35.9 35.8 - 46.3 %    MCV 91.8 79.6 - 97.8 FL    MCH 28.9 26.1 - 32.9 PG    MCHC 31.5 31.4 - 35.0 g/dL    RDW 12.8 11.9 - 14.6 %    PLATELET 252 995 - 470 K/uL    MPV 10.2 9.4 - 12.3 FL    ABSOLUTE NRBC 0.00 0.0 - 0.2 K/uL    DF AUTOMATED      NEUTROPHILS 70 43 - 78 % LYMPHOCYTES 16 13 - 44 %    MONOCYTES 12 4.0 - 12.0 %    EOSINOPHILS 2 0.5 - 7.8 %    BASOPHILS 0 0.0 - 2.0 %    IMMATURE GRANULOCYTES 0 0.0 - 5.0 %    ABS. NEUTROPHILS 8.6 (H) 1.7 - 8.2 K/UL    ABS. LYMPHOCYTES 1.9 0.5 - 4.6 K/UL    ABS. MONOCYTES 1.5 (H) 0.1 - 1.3 K/UL    ABS. EOSINOPHILS 0.2 0.0 - 0.8 K/UL    ABS. BASOPHILS 0.0 0.0 - 0.2 K/UL    ABS. IMM. GRANS. 0.0 0.0 - 0.5 K/UL   METABOLIC PANEL, COMPREHENSIVE    Collection Time: 12/31/19 11:19 PM   Result Value Ref Range    Sodium 135 (L) 136 - 145 mmol/L    Potassium 4.3 3.5 - 5.1 mmol/L    Chloride 100 98 - 107 mmol/L    CO2 28 21 - 32 mmol/L    Anion gap 7 7 - 16 mmol/L    Glucose 177 (H) 65 - 100 mg/dL    BUN 30 (H) 8 - 23 MG/DL    Creatinine 1.51 (H) 0.6 - 1.0 MG/DL    GFR est AA 43 (L) >60 ml/min/1.73m2    GFR est non-AA 35 (L) >60 ml/min/1.73m2    Calcium 10.1 8.3 - 10.4 MG/DL    Bilirubin, total 0.2 0.2 - 1.1 MG/DL    ALT (SGPT) 8 (L) 12 - 65 U/L    AST (SGOT) 10 (L) 15 - 37 U/L    Alk.  phosphatase 110 50 - 136 U/L    Protein, total 7.0 6.3 - 8.2 g/dL    Albumin 3.6 3.2 - 4.6 g/dL    Globulin 3.4 2.3 - 3.5 g/dL    A-G Ratio 1.1 (L) 1.2 - 3.5     URINE MICROSCOPIC    Collection Time: 12/31/19 11:37 PM   Result Value Ref Range    WBC >100 (H) 0 /hpf    RBC 0-3 0 /hpf    Epithelial cells 0 0 /hpf    Bacteria 3+ (H) 0 /hpf    Casts 0-3 0 /lpf   GLUCOSE, POC    Collection Time: 01/01/20  2:03 AM   Result Value Ref Range    Glucose (POC) 130 (H) 65 - 100 mg/dL   GLUCOSE, POC    Collection Time: 01/01/20  8:15 AM   Result Value Ref Range    Glucose (POC) 112 (H) 65 - 100 mg/dL        All Micro Results     Procedure Component Value Units Date/Time    CULTURE, URINE [[id number]]     Order Status:  Sent Specimen:  Urine from Clean catch           Current Meds:  Current Facility-Administered Medications   Medication Dose Route Frequency    buPROPion SR (WELLBUTRIN SR) tablet 150 mg  150 mg Oral 7am    citalopram (CELEXA) tablet 20 mg  20 mg Oral DAILY    galantamine (RAZADYNE) tablet 4 mg  4 mg Oral BID WITH MEALS    levothyroxine (SYNTHROID) tablet 100 mcg  100 mcg Oral ACB    isosorbide mononitrate ER (IMDUR) tablet 60 mg  60 mg Oral 7am    metoprolol tartrate (LOPRESSOR) tablet 50 mg  50 mg Oral BID    pantoprazole (PROTONIX) tablet 40 mg  40 mg Oral ACB    prednisoLONE acetate (PRED FORTE) 1 % ophthalmic suspension 1 Drop  1 Drop Both Eyes QID    simvastatin (ZOCOR) tablet 40 mg  40 mg Oral QHS    sodium chloride (NS) flush 5-40 mL  5-40 mL IntraVENous Q8H    sodium chloride (NS) flush 5-40 mL  5-40 mL IntraVENous PRN    acetaminophen (TYLENOL) tablet 650 mg  650 mg Oral Q6H PRN    ondansetron (ZOFRAN) injection 4 mg  4 mg IntraVENous Q4H PRN    tuberculin injection 5 Units  5 Units IntraDERMal ONCE    insulin lispro (HUMALOG) injection   SubCUTAneous AC&HS    0.9% sodium chloride infusion  75 mL/hr IntraVENous CONTINUOUS    cefTRIAXone (ROCEPHIN) 1 g in 0.9% sodium chloride (MBP/ADV) 50 mL  1 g IntraVENous Q24H    0.9% sodium chloride infusion  1 mL/kg/hr IntraVENous PRN    sodium chloride 0.9 % bolus infusion 100 mL  100 mL IntraVENous RAD ONCE       Other Studies:  No results found for this visit on 01/01/20. Ct Head Wo Cont    Result Date: 1/1/2020  CT HEAD WITHOUT CONTRAST HISTORY:  CVA. COMPARISON: None TECHNIQUE: Axial imaging was performed without intravenous contrast utilizing 5mm slice thickness. Sagittal and coronal reformats were performed. Radiation dose reduction techniques were used for this study. Our CT scanner uses one or all of the following: Automated exposure control, adjustment of the MAS or KUB according to patient's size and iterative reconstruction. FINDINGS:    *BRAIN:    -  There are no early signs of territorial or lacunar infarction by CT.    -  Left frontotemporal subarachnoid hemorrhage unchanged.  Also consider the possibility of a hemorrhagic infarct.    -  No gross white matter abnormality by CT. *VISUALIZED PARANASAL SINUSES: Well aerated. *MASTOIDS:  Clear. *CALVARIUM AND SCALP: Unremarkable. IMPRESSION: No interval change. Date of Dictation: 1/1/2020 5:12 AM     Ct Head Wo Cont    Result Date: 12/31/2019  CT HEAD WITHOUT CONTRAST HISTORY:  Head trauma. COMPARISON: 4/7/2013 TECHNIQUE: Axial imaging was performed without intravenous contrast utilizing 5mm slice thickness. Sagittal and coronal reformats were performed. Radiation dose reduction techniques were used for this study. Our CT scanner uses one or all of the following: Automated exposure control, adjustment of the MAS or KUB according to patient's size and iterative reconstruction. FINDINGS:    *BRAIN:    -  Left basal ganglia lacunar infarct unchanged. -  Subarachnoid hemorrhage in the left frontal lobe, left sylvian fissure and left temporal lobe. Small hemorrhagic contusion in the left posterior temporal lobe. Symmetric supratentorial atrophy. -  For patient's age, the scattered areas of white matter hypodensities may represent a chronic small vessel white matter ischemia. However this is nonspecific. *VISUALIZED PARANASAL SINUSES: Well aerated. *MASTOIDS:  Clear. *CALVARIUM AND SCALP: Unremarkable. IMPRESSION: Subarachnoid hemorrhage and left temporal intraparenchymal contusion as described above. Follow-up is recommended.  Date of Dictation: 12/31/2019 10:20 PM       Assessment and Plan:     Hospital Problems as of 1/1/2020 Date Reviewed: 4/20/2017          Codes Class Noted - Resolved POA    UTI (urinary tract infection) ICD-10-CM: N39.0  ICD-9-CM: 599.0  1/1/2020 - Present Yes        Subarachnoid hematoma ([facility] [address] ICD-10-CM: D02.8E2K  ICD-9-CM: 852.00  1/1/2020 - Present Unknown        GIANLUCA (acute kidney injury) ([facility] [address] ICD-10-CM: N17.9  ICD-9-CM: 584.9  1/1/2020 - Present Unknown        * (Principal) Subarachnoid hemorrhage ([facility] [address] ICD-10-CM: I60.9  ICD-9-CM: 079  12/31/2019 - Present Yes        Anemia (Chronic) ICD-10-CM: D64.9  ICD-9-CM: 285.9 12/31/2019 - Present Yes        Facial laceration ICD-10-CM: K74.41MQ  ICD-9-CM: 873.40  12/31/2019 - Present Yes        CVA (cerebral vascular accident) ([facility] [address] (Chronic) ICD-10-CM: I63.9  ICD-9-CM: 434.91  12/31/2019 - Present Yes        Late onset Alzheimer's disease without behavioral disturbance (HCC) (Chronic) ICD-10-CM: G30.1, F02.80  ICD-9-CM: 331.0, 294.10  7/12/2017 - Present Yes        Controlled type 2 diabetes mellitus with complication, without long-term current use of insulin (HCC) ICD-10-CM: E11.8  ICD-9-CM: 250.90  5/16/2016 - Present Yes        Hypothyroidism, adult (Chronic) ICD-10-CM: E03.9  ICD-9-CM: 244.9  9/23/2015 - Present Yes        CAD (coronary artery disease) (Chronic) ICD-10-CM: I25.10  ICD-9-CM: 414.00  8/27/2009 - Present Yes        Essential hypertension (Chronic) ICD-10-CM: I10  ICD-9-CM: 401.9  8/27/2009 - Present Yes              Plan:  # Subarachnoid hemorrhage   - Repeat CT overnight unchanged. CTA read pending. - Neurosurgery consulted. - BPs at goal, hold blood thinners    # GIANLUCA   - Con't IVFs. Repeat BMP tomorrow. # UTI   - Culture sent. Ceftriaxone. Unsure if symptoms due to dementia. # HTN   - Con't home meds    # Dementia/depression   - Home meds    # DM2   - SSI. Metformin held. # CAD s/p CABG   - ASA/Plavix held with [facility] planning/Dispo: Unclear. PPD. Daughter Víctor Brooke [phone].   Diet:  DIET DIABETIC CONSISTENT CARB  DVT ppx:      Signed:  Scottie Blevins MD

## 2020-01-01 NOTE — PROGRESS NOTES
Bedside report received from [facility], [facility].  Dual [facility] performed

## 2020-01-01 NOTE — PROGRESS NOTES
TRANSFER - IN REPORT:    Verbal report received from Kenyetta(name) on Keena PAGAN Branham  being received from [address] [place] [address](unit) for change in patient condition(traumatic subarachnoid hemorrhage )      Report consisted of patients Situation, Background, Assessment and   Recommendations(SBAR). Information from the following report(s) SBAR, Kardex, ED Summary, Intake/Output, MAR, Recent Results, Med Rec Status, Cardiac Rhythm NSR and Alarm Parameters  was reviewed with the receiving nurse. Opportunity for questions and clarification was provided. Assessment completed upon patients arrival to unit and care assumed.

## 2020-01-01 NOTE — H&P
Hospitalist H&P Note     Admit Date:  No admission date for patient encounter. Name:  Kristen Ledbetter   Age:  78 y.o.  :  1940   MRN:  [id number]   PCP:  Elena Fernandez MD  Treatment Team: Attending Provider: Tavo Rhoades MD    HPI:     CC:  Fall with head laceration       Ms. Ericka Osorio is an 79 yo female with PMH of dementia, CAD s/p CABG, on ASA/plavix, CVA, HTN, DM2, who is evaluated after a fall while ambulating back from the bathroom. She is supposed to use her walker but family states her memory loss inhibits he recall to utilize it. She sustained right forehead trauma with laceration requiring sutures in the ED. CT head shows SAH of left temporal lobe with intraparenchymal contusion. Per Dr. Ace Swanson, neurosurgery has requested CTA head to rule out aneurysmal bleed. Daughter at bedside provides history due to patients dementia     10 systems not done due to dementia       Past Medical History:   Diagnosis Date    Alzheimer's dementia ([address]     Anxiety state, unspecified     CAD (coronary artery disease)     Diabetes ([address] 2009    GERD (gastroesophageal reflux disease)     History of snoring 2016    History of stroke 10/19/2016    Hypertension     Hypothyroid 2009    Late onset Alzheimer's disease without behavioral disturbance ([address] 2012    Mild dementia ([address] 2016    Mixed hyperlipidemia     Osteopenia     Reactive depression 2016    Retinopathy due to secondary diabetes ([address] 2009    Sensory polyneuropathy 2016      Past Surgical History:   Procedure Laterality Date    HX ANGIOPLASTY  2009    STENTING-post CABG    HX CHOLECYSTECTOMY      LAPAROSCOPIC    HX CORONARY ARTERY BYPASS GRAFT      triple [id number] thereafter    HX HEENT      right eyex  2-laser surgery?     HX KNEE ARTHROSCOPY      Right    HX OTHER SURGICAL  2008    cataract extraction and iol implant    HX OTHER SURGICAL      Carotid stent 3  HX THYROIDECTOMY      HX TONSILLECTOMY  as child    HX TOTAL VAGINAL HYSTERECTOMY      hysterectomy    HX UROLOGICAL      bladder tack      Allergies   Allergen Reactions    Ace Inhibitors Cough    Aricept [Donepezil] Diarrhea    Dextrose Unknown (comments)    Gatifloxacin Other (comments)     HYPERTENSION    Levaquin [Levofloxacin] Hives and Other (comments)     Causes blood sugar to \"bottom out\"    Macrodantin [Nitrofurantoin Macrocrystalline] Diarrhea    Morphine Other (comments)     Urinary retention      Namenda [Memantine] Nausea Only    Carmencita Hives     All Carmencita class of drugs    Sulfa (Sulfonamide Antibiotics) Unknown (comments)      Social History     Tobacco Use    Smoking status: Never Smoker    Smokeless tobacco: Never Used   Substance Use Topics    Alcohol use: No      Family History   Problem Relation Age of Onset    Heart Disease Mother         CHF    Lung Disease Mother     COPD Mother     Heart Disease Father         CAD    Cancer Father         LUNG    Heart Disease Maternal Grandfather     Diabetes Maternal Grandfather     Heart Disease Paternal Grandfather     Breast Cancer Child 50    Hypertension Sister     Other Sister         TACHYCARDIA    Diabetes Maternal Grandmother     Arthritis-osteo Sister     Malignant Hyperthermia Neg Hx     Pseudocholinesterase Deficiency Neg Hx     Delayed Awakening Neg Hx     Post-op Nausea/Vomiting Neg Hx     Emergence Delirium Neg Hx     Post-op Cognitive Dysfunction Neg Hx       Immunization History   Administered Date(s) Administered    Influenza High Dose Vaccine PF 10/12/2016, 09/20/2017, 09/26/2018    Influenza Vaccine 10/08/2012, 09/23/2015    Pneumococcal Conjugate (PCV-13) 09/23/2015    Pneumococcal Vaccine (Unspecified Type) 09/28/2009    TB Skin Test (PPD) Intradermal 06/20/2019, 11/25/2019    Tdap 05/21/2010, 12/31/2019     PTA Medications:  Cannot display prior to admission medications because the patient has not been admitted in this contact. Objective:   No data found. No intake or output data in the 24 hours ending 12/31/19 3864    Physical Exam:  General:    Alert. No distress, elderly  Eyes:   Normal sclera. Extraocular movements intact. PERRLA  ENT:  Normocephalic, atraumatic. Moist mucous membranes  CV:   RRR. III/VI GILBERTO LLSB, No edema  Lungs:  CTAB. No wheezing, rhonchi, or rales. anterior  Abdomen: Soft, nontender, nondistended. Present BS  Extremities: Warm and dry. .  Neurologic:  grossly intact. Follows commands, moves all extremities with 5/5 strength   Skin:     Contusion right periorbital area   Psych:  Normal mood and affect. I reviewed the labs, imaging, EKGs, telemetry, and other studies done this admission. Data Review:   No results found for this or any previous visit (from the past 24 hour(s)). All Micro Results     None          Other Studies:  Ct Head Wo Cont    Result Date: 12/31/2019  CT HEAD WITHOUT CONTRAST HISTORY:  Head trauma. COMPARISON: 4/7/2013 TECHNIQUE: Axial imaging was performed without intravenous contrast utilizing 5mm slice thickness. Sagittal and coronal reformats were performed. Radiation dose reduction techniques were used for this study. Our CT scanner uses one or all of the following: Automated exposure control, adjustment of the MAS or KUB according to patient's size and iterative reconstruction. FINDINGS:    *BRAIN:    -  Left basal ganglia lacunar infarct unchanged. -  Subarachnoid hemorrhage in the left frontal lobe, left sylvian fissure and left temporal lobe. Small hemorrhagic contusion in the left posterior temporal lobe. Symmetric supratentorial atrophy. -  For patient's age, the scattered areas of white matter hypodensities may represent a chronic small vessel white matter ischemia. However this is nonspecific. *VISUALIZED PARANASAL SINUSES: Well aerated. *MASTOIDS:  Clear. *CALVARIUM AND SCALP: Unremarkable.      IMPRESSION: Subarachnoid hemorrhage and left temporal intraparenchymal contusion as described above. Follow-up is recommended.  Date of Dictation: 12/31/2019 10:20 PM       Assessment and Plan:     Hospital Problems as of 12/31/2019 Date Reviewed: 4/20/2017          Codes Class Noted - Resolved POA    * (Principal) Subarachnoid hemorrhage ([facility] ICD-10-CM: I60.9  ICD-9-CM: 164  12/31/2019 - Present Yes        Anemia (Chronic) ICD-10-CM: D64.9  ICD-9-CM: 285.9  12/31/2019 - Present Yes        Facial laceration ICD-10-CM: X09.80HL  ICD-9-CM: 873.40  12/31/2019 - Present Yes        CVA (cerebral vascular accident) ([facility] (Chronic) ICD-10-CM: I63.9  ICD-9-CM: 434.91  12/31/2019 - Present Yes        Late onset Alzheimer's disease without behavioral disturbance ([facility] (Chronic) ICD-10-CM: G30.1, F02.80  ICD-9-CM: 331.0, 294.10  7/12/2017 - Present Yes        Controlled type 2 diabetes mellitus with complication, without long-term current use of insulin (HCC) ICD-10-CM: E11.8  ICD-9-CM: 250.90  5/16/2016 - Present Yes        Hypothyroidism, adult (Chronic) ICD-10-CM: E03.9  ICD-9-CM: 244.9  9/23/2015 - Present Yes        CAD (coronary artery disease) (Chronic) ICD-10-CM: I25.10  ICD-9-CM: 414.00  8/27/2009 - Present Yes        Essential hypertension (Chronic) ICD-10-CM: I10  ICD-9-CM: 401.9  8/27/2009 - Present Yes              · SAH: admit to ICU, [facility] availability, pending CTA head, followup nonconrtasted CT head in the morning, stop asa/plavix, PPD, PT/OT consults   · HTN: goal <140/90, currently normotensive, continued metoprolol and isosorbide   · DM2: holding glucophage, add SSI   · Anemia: chronic, CBC ordered   · Facial laceration s/p suture: will need removal in 5 days   · CAD: continued statin, stopped asa/plavix  · Dementia: continued razadyne  · Depression: continued Wellbutrin and celexa     Discharge planning:  PPD, pending   DVT ppx: SCD  Code status:  DNR  Estimated LOS:  Greater than 2 midnights  Risk:  high  Care plan: lyly Mcintosh [phone]  Signed:  Shannon Nicole MD

## 2020-01-01 NOTE — PROGRESS NOTES
NS  CT HEAD THIS AM  UNCHANGED  CTA  PENDING  A/P SAH  WILL RESOLVE WITHOUT INTERVENTION  HOWEVER,  IF CTA IS POSITIVE FOR ANEURYSM PLEASE CONSULT DR. Jailene Rodriguez MD

## 2020-01-01 NOTE — PROGRESS NOTES
NS  CT HEAD SOWS A SMALL AREA OF TRAUMATIC SAH IN THE THE LEFT TEMPORAL AREA AND FRONTAL REGION   S/P FALL ON RIGHT SIDE  ON PLAVIX  A/P AGREE  ON HOLDING PLAVIX  CTA  TO R/O ANEURYSM ( RIGHT SIDED INJURY, LEFT SIDED BLEEDING)    REPEAT CT IN AM  IF CTA  Landon Shirley MD

## 2020-01-01 NOTE — PROGRESS NOTES
Problem: Mobility Impaired (Adult and Pediatric)  Goal: *Acute Goals and Plan of Care (Insert Text)  Description  LTG:  (1.)Ms. Branham will move from supine to sit and sit to supine , scoot up and down and roll side to side in bed with CONTACT GUARD ASSIST within 7 treatment day(s). (2.)Ms. Branham will transfer from bed to chair and chair to bed with MINIMAL ASSIST using the least restrictive device within 7 treatment day(s). (3.)Ms. Branham will ambulate with MINIMAL ASSIST for 80 feet with the least restrictive device within 7 treatment day(s). (4.)Ms. Branham will participate in therapeutic activity/exercises x 23 minutes for increased strength within 7 treatment days. (5.)Ms. Branham will perform sitting static and dynamic balance activities x 15 minutes with SUPERVISION to improve safety within 7 treatment day(s). ________________________________________________________________________________________________     Outcome: Progressing Towards Goal     PHYSICAL THERAPY: Initial Assessment and AM 1/1/2020  INPATIENT: PT Visit Days : 1  Payor: Román Ramirez / Plan: [id number] PPO/PFFS / Product Type: Managed Care Medicare /       NAME/AGE/GENDER: Davis Talbot is a 78 y.o. female   PRIMARY DIAGNOSIS: Subarachnoid hematoma ([facility] [S06.6X9A] Subarachnoid hemorrhage (HCC) Subarachnoid hemorrhage (HCC)        ICD-10: Treatment Diagnosis:    Generalized Muscle Weakness (M62.81)  Difficulty in walking, Not elsewhere classified (R26.2)  Repeated Falls (R29.6)  History of falling (Z91.81)   Precaution/Allergies:  Ace inhibitors; Aricept [donepezil]; Dextrose; Gatifloxacin; Levaquin [levofloxacin]; Macrodantin [nitrofurantoin macrocrystalline]; Morphine; Namenda [memantine]; [place]; and Sulfa (sulfonamide antibiotics)      ASSESSMENT:     Ms. Jessy Brunner is a 78 y.o. female in the hospital for the above who was supine in bed upon arrival.  Pt presents with obvious bruising/swelling to R eye and pallor. Pt has had several falls in the 6 months including previous admission for hip fracture. She was disoriented x 4 but able to identify visitors as her children. Ms. Carlton Baum presents to PT with decreased AROM and strength in B LEs (R>L). Pt also presents with intact B LE sensation. She appeared to have some increased tone in R LE knee flexors. Pt struggled with some higher level command following. She transferred to sitting on EOB with moderate assistance and fair sitting balance. She was assisted back to supine after some EOB activities with modA x 2. Pt given maxA x 2 for scooting up in bed and bed placed in chair position. Ms. Carlton Baum could benefit from skilled PT as she is currently functioning below her baseline. This section established at most recent assessment   PROBLEM LIST (Impairments causing functional limitations):  Decreased Strength  Decreased ADL/Functional Activities  Decreased Transfer Abilities  Decreased Ambulation Ability/Technique  Decreased Balance  Decreased Activity Tolerance  Decreased Cognition   INTERVENTIONS PLANNED: (Benefits and precautions of physical therapy have been discussed with the patient.)  Balance Exercise  Bed Mobility  Family Education  Gait Training  Neuromuscular Re-education/Strengthening  Therapeutic Activites  Therapeutic Exercise/Strengthening  Transfer Training     TREATMENT PLAN: Frequency/Duration: 3 times a week for duration of hospital stay  Rehabilitation Potential For Stated Goals: [facility] (at time of discharge pending progress):    Placement: It is my opinion, based on this patient's performance to date, that Ms. Branham may benefit from long term care facility or [facility] given her cognitive deficits and repeated traumatic falls.   Equipment:   None at this time              HISTORY:   History of Present Injury/Illness (Reason for Referral):  [facility]  Past Medical History/Comorbidities:   Ms. Carlton Baum  has a past medical history of Alzheimer's dementia ([facility] Anxiety state, unspecified, CAD (coronary artery disease), Diabetes ([facility] (8/27/2009), GERD (gastroesophageal reflux disease), History of snoring (7/13/2016), History of stroke (10/19/2016), Hypertension, Hypothyroid (8/27/2009), Late onset Alzheimer's disease without behavioral disturbance ([facility] (07/12/2012), Mild dementia ([facility] (8/30/2016), Mixed hyperlipidemia, Osteopenia, Reactive depression (5/16/2016), Retinopathy due to secondary diabetes ([facility] (8/27/2009), and Sensory polyneuropathy (8/30/2016). Ms. Melissa Martini  has a past surgical history that includes hx cholecystectomy (1993); hx urological; hx knee arthroscopy; hx heent; hx tonsillectomy (as child); hx other surgical (2008); hx angioplasty (2009); hx other surgical; hx thyroidectomy; hx coronary artery bypass graft (2009); and hx total vaginal hysterectomy. Social History/Living Environment:   Home Environment: [address] [place] [address] Name: Kassidy Chacon  Living Alone: No  Support Systems: [facility] facility, Child(t[place])  Patient Expects to be Discharged to[de-identified] Unknown  Tub or Shower Type: Shower  Prior Level of Function/Work/Activity:  Resides in memory care unit where she is ambulatory with assistance. Several bad falls in the past 6 months. Number of Personal Factors/Comorbidities that affect the Plan of Care: 3+: HIGH COMPLEXITY   EXAMINATION:   Most Recent Physical Functioning:   Gross Assessment:  AROM: Generally decreased, functional(R LE)  PROM: Generally decreased, functional(R knee extension)  Strength: Generally decreased, functional(B LEs (R>L))  Tone: Abnormal(R LE knee flexion tone)  Sensation: Intact               Posture:     Balance:  Sitting: Impaired  Sitting - Static: Fair (occasional)  Sitting - Dynamic: Fair (occasional)(-) Bed Mobility:  Rolling: Minimum assistance  Supine to Sit: Moderate assistance  Sit to Supine:  Moderate assistance;Assist x2  Scooting: Maximum assistance;Assist x2  Wheelchair Mobility:     Transfers:     Gait:            Body Structures Involved:  Nerves  Muscles Body Functions Affected:  Mental  Neuromusculoskeletal  Movement Related Activities and Participation Affected:  Learning and Applying Knowledge  General Tasks and Demands  Mobility  Self Care  Domestic Life  Interpersonal Interactions and Relationships  Community, Social and Civic Life   Number of elements that affect the Plan of Care: 4+: HIGH COMPLEXITY   CLINICAL PRESENTATION:   Presentation: Stable and uncomplicated: LOW COMPLEXITY   CLINICAL DECISION MAKIN [facility] Inpatient Short Form  How much difficulty does the patient currently have. .. Unable A Lot A Little None   1. Turning over in bed (including adjusting bedclothes, sheets and blankets)? [] 1   [] 2   [x] 3   [] 4   2. Sitting down on and standing up from a chair with arms ( e.g., wheelchair, bedside commode, etc.)   [] 1   [x] 2   [] 3   [] 4   3. Moving from lying on back to sitting on the side of the bed? [] 1   [x] 2   [] 3   [] 4   How much help from another person does the patient currently need. .. Total A Lot A Little None   4. Moving to and from a bed to a chair (including a wheelchair)? [] 1   [x] 2   [] 3   [] 4   5. Need to walk in hospital room? [] 1   [x] 2   [] 3   [] 4   6. Climbing 3-5 steps with a railing? [] 1   [x] 2   [] 3   [] 4   © , Trustees of [address] [place] [address], under license to [employer]. All rights reserved      Score:  Initial: 13 Most Recent: X (Date: -- )    Interpretation of Tool:  Represents activities that are increasingly more difficult (i.e. Bed mobility, Transfers, Gait). Medical Necessity:     Patient demonstrates   fair   rehab potential due to higher previous functional level. Reason for Services/Other Comments:  Patient continues to require skilled intervention due to   Decreased functional mobility and balance   .    Use of outcome tool(s) and clinical judgement create a POC that gives a: Clear prediction of patient's progress: LOW COMPLEXITY            TREATMENT:   (In addition to Assessment/Re-Assessment sessions the following treatments were rendered)   Pre-treatment Symptoms/Complaints: \"That's my first baby\"  Pain: Initial:   Pain Intensity 1: 0  Post Session:  FLACC 0     Therapeutic Activity: (    10 minutes): Therapeutic activities including rolling, supine to sit <>, scooting, and sitting EOB activities to improve mobility, strength, balance, and coordination. Required moderate cuing   to promote static and dynamic balance in sitting and promote coordination of bilateral, upper extremity(s), lower extremity(s). Braces/Orthotics/Lines/Etc:   IV  O2 Device: Room air  Treatment/Session Assessment:    Response to Treatment:  Tolerated well but family reports increased confusion. Interdisciplinary Collaboration:   Physical Therapist  Registered Nurse  After treatment position/precautions:   Supine in bed  Bed/Chair-wheels locked  Bed in low position  Call light within reach  RN notified  Family at bedside  Side rails x 3   Compliance with Program/Exercises: Will assess as treatment progresses  Recommendations/Intent for next treatment session: \"Next visit will focus on advancements to more challenging activities and reduction in assistance provided\".   Total Treatment Duration:  PT Patient Time In/Time Out  Time In: 1105  Time Out: [address] Hemant PT, DPT

## 2020-01-01 NOTE — ED NOTES
TRANSFER - OUT REPORT:    Verbal report given to HUEY Mensah  on [address]  being transferred to [facility] routine progression of care       Report consisted of patients Situation, Background, Assessment and   Recommendations(SBAR). Information from the following report(s) SBAR, ED Summary, Accordion, Recent Results, Cardiac Rhythm Normal Sinus and Dual Neuro Assessment was reviewed with the receiving nurse. Lines:   Peripheral IV 12/31/19 Left Antecubital (Active)   Site Assessment Clean, dry, & intact 12/31/2019 10:57 PM   Phlebitis Assessment 0 12/31/2019 10:57 PM   Infiltration Assessment 0 12/31/2019 10:57 PM   Dressing Status Clean, dry, & intact 12/31/2019 10:57 PM   Dressing Type Transparent;Tape 12/31/2019 10:57 PM   Hub Color/Line Status Blue;Patent; Flushed 12/31/2019 10:57 PM   Alcohol Cap Used No 12/31/2019 10:57 PM       Peripheral IV 12/31/19 Right Forearm (Active)   Site Assessment Clean, dry, & intact 12/31/2019 11:20 PM   Phlebitis Assessment 0 12/31/2019 11:20 PM   Infiltration Assessment 0 12/31/2019 11:20 PM   Dressing Status Clean, dry, & intact 12/31/2019 11:20 PM   Hub Color/Line Status Blue 12/31/2019 11:20 PM        Opportunity for questions and clarification was provided.       Patient transported with:   Monitor with [employer] transport

## 2020-01-01 NOTE — ED PROVIDER NOTES
Patient is in the memory care unit of a nursing home. She presents with EMS after a fall from standing. She suffered a laceration to her right forehead with bleeding. EMS states she is on Plavix. Per nursing home staff, she is at her baseline mental status. The patient is a poor historian due to her dementia. Past Medical History:   Diagnosis Date    Alzheimer's dementia ([address]     Anxiety state, unspecified     CAD (coronary artery disease)     Diabetes ([address] 8/27/2009    GERD (gastroesophageal reflux disease)     History of snoring 7/13/2016    History of stroke 10/19/2016    Hypertension     Hypothyroid 8/27/2009    Late onset Alzheimer's disease without behavioral disturbance ([address] 07/12/2012    Mild dementia ([address] 8/30/2016    Mixed hyperlipidemia     Osteopenia     Reactive depression 5/16/2016    Retinopathy due to secondary diabetes ([address] 8/27/2009    Sensory polyneuropathy 8/30/2016       Past Surgical History:   Procedure Laterality Date    HX ANGIOPLASTY  2009    STENTING-post CABG    HX CHOLECYSTECTOMY  1993    LAPAROSCOPIC    HX CORONARY ARTERY BYPASS GRAFT  2009    triple sebrst-Ipdscs-fugkqzgk thereafter    HX HEENT      right eyex  2-laser surgery?     HX KNEE ARTHROSCOPY      Right    HX OTHER SURGICAL  2008    cataract extraction and iol implant    HX OTHER SURGICAL      Carotid stent 3    HX THYROIDECTOMY      HX TONSILLECTOMY  as child    HX TOTAL VAGINAL HYSTERECTOMY      hysterectomy    HX UROLOGICAL      bladder tack         Family History:   Problem Relation Age of Onset    Heart Disease Mother         CHF    Lung Disease Mother     COPD Mother     Heart Disease Father         CAD    Cancer Father         LUNG    Heart Disease Maternal Grandfather     Diabetes Maternal Grandfather     Heart Disease Paternal Grandfather     Breast Cancer Child 50    Hypertension Sister     Other Sister         TACHYCARDIA    Diabetes Maternal Grandmother     Arthritis-osteo Sister     Malignant Hyperthermia Neg Hx     Pseudocholinesterase Deficiency Neg Hx     Delayed Awakening Neg Hx     Post-op Nausea/Vomiting Neg Hx     Emergence Delirium Neg Hx     Post-op Cognitive Dysfunction Neg Hx        Social History     Socioeconomic History    Marital status:      Spouse name: Not on file    Number of children: Not on file    Years of education: Not on file    Highest education level: Not on file   Occupational History    Not on file   Social Needs    Financial resource strain: Not on file    Food insecurity:     Worry: Not on file     Inability: Not on file    Transportation needs:     Medical: Not on file     Non-medical: Not on file   Tobacco Use    Smoking status: Never Smoker    Smokeless tobacco: Never Used   Substance and Sexual Activity    Alcohol use: No    Drug use: No    Sexual activity: Not Currently     Partners: Male   Lifestyle    Physical activity:     Days per week: Not on file     Minutes per session: Not on file    Stress: Not on file   Relationships    Social connections:     Talks on phone: Not on file     Gets together: Not on file     Attends Restorationist service: Not on file     Active member of club or organization: Not on file     Attends meetings of clubs or organizations: Not on file     Relationship status: Not on file    Intimate partner violence:     Fear of current or ex partner: Not on file     Emotionally abused: Not on file     Physically abused: Not on file     Forced sexual activity: Not on file   Other Topics Concern    Not on file   Social History Narrative    Not on file         ALLERGIES: Ace inhibitors; Aricept [donepezil]; Dextrose; Gatifloxacin; Levaquin [levofloxacin]; Macrodantin [nitrofurantoin macrocrystalline];  Morphine; Namenda [memantine]; Sheila Bookbinder; and Sulfa (sulfonamide antibiotics)    Review of Systems   Unable to perform ROS: Dementia       Vitals:    12/31/19 2120 12/31/19 2128 12/31/19 2147 12/31/19 2151   BP: 124/44  124/53    Pulse:  78     Resp:  18     Temp:  97.5 °F (36.4 °C)     SpO2:  93% 97% 93%            Physical Exam  Vitals signs and nursing note reviewed. Constitutional:       General: She is not in acute distress. Appearance: She is well-developed. HENT:      Head: Normocephalic. Comments: 1.5cm linear laceration with active bleeding  Eyes:      Conjunctiva/sclera: Conjunctivae normal.      Pupils: Pupils are equal, round, and reactive to light. Neck:      Musculoskeletal: Normal range of motion and neck supple. Pulmonary:      Effort: Pulmonary effort is normal. No respiratory distress. Musculoskeletal:         General: No tenderness. Skin:     General: Skin is warm and dry. Neurological:      Mental Status: She is alert. Psychiatric:         Behavior: Behavior normal.          MDM  Number of Diagnoses or Management Options  Dementia without behavioral disturbance, unspecified dementia type [facility]):   Subarachnoid hemorrhage following injury, no loss of consciousness, initial encounter [facility]): new and requires workup  Diagnosis management comments: 10:28  PM ET scan shows a traumatic subarachnoid hemorrhage. Discussed results with patient and the family who is now present. They state patient is a DNR. Dr. Bradley Matthews has been paged. 10:33 PM spoke with Dr. Bradley Matthews, he reviewed CT findings. He recommends holding her Plavix and repeating her CT scan tomorrow morning.   10:39 PM Dr. Bradley Matthews called back, request the patient get a CT angiogram as her bleed is on the left side  10:44 PM Spoke with Dr Sobeida Guzmán, to get [place] bed [place]       Amount and/or Complexity of Data Reviewed  Clinical lab tests: ordered  Tests in the radiology section of CPT®: reviewed and ordered  Obtain history from someone other than the patient: yes  Discuss the patient with other providers: yes    Risk of Complications, Morbidity, and/or Mortality  Presenting problems: moderate  Diagnostic procedures: moderate  Management options: moderate    Patient Progress  Patient progress: stable         Wound Repair  Date/Time: 12/31/2019 10:29 PM  Performed by: attendingPreparation: skin prepped with alcohol  Pre-procedure re-eval: Immediately prior to the procedure, the patient was reevaluated and found suitable for the planned procedure and any planned medications. Time out: Immediately prior to the procedure a time out was called to verify the correct patient, procedure, equipment, staff and marking as appropriate. .  Location details: face  Wound length:2.5 cm or less  Foreign bodies: no foreign bodies  Irrigation solution: saline  Irrigation method: syringe  Debridement: none  Skin closure: Vicryl  Number of sutures: 3  Technique: simple  Approximation: close  Dressing: 4x4, antibiotic ointment and pressure dressing  Patient tolerance: Patient tolerated the procedure well with no immediate complications  My total time at bedside, performing this procedure was 1-15 minutes.

## 2020-01-01 NOTE — PROGRESS NOTES
Initial visit to assess pt's spiritual needs. Feeling today? better    Receiving good care?  yes    Needs from Spiritual Care:  Prayers    Pt is active member of [employer] and will be visited & cared for by pastors. Ministry of presence & prayer to demonstrate caring & concern, convey emotional & spiritual support.     Chaplain Mk Schrader, MDiv,ThM,PhD

## 2020-01-02 LAB
ANION GAP SERPL CALC-SCNC: 5 MMOL/L (ref 7–16)
BUN SERPL-MCNC: 20 MG/DL (ref 8–23)
CALCIUM SERPL-MCNC: 8.7 MG/DL (ref 8.3–10.4)
CHLORIDE SERPL-SCNC: 111 MMOL/L (ref 98–107)
CO2 SERPL-SCNC: 26 MMOL/L (ref 21–32)
CREAT SERPL-MCNC: 0.93 MG/DL (ref 0.6–1)
GLUCOSE BLD STRIP.AUTO-MCNC: 176 MG/DL (ref 65–100)
GLUCOSE BLD STRIP.AUTO-MCNC: 176 MG/DL (ref 65–100)
GLUCOSE BLD STRIP.AUTO-MCNC: 197 MG/DL (ref 65–100)
GLUCOSE SERPL-MCNC: 72 MG/DL (ref 65–100)
MM INDURATION POC: 0 MM (ref 0–5)
POTASSIUM SERPL-SCNC: 3.8 MMOL/L (ref 3.5–5.1)
PPD POC: NEGATIVE NEGATIVE
SODIUM SERPL-SCNC: 142 MMOL/L (ref 136–145)

## 2020-01-02 PROCEDURE — 97530 THERAPEUTIC ACTIVITIES: CPT

## 2020-01-02 PROCEDURE — 77030020263 HC SOL INJ SOD CL0.9% LFCR 1000ML

## 2020-01-02 PROCEDURE — 74011000258 HC RX REV CODE- 258: Performed by: INTERNAL MEDICINE

## 2020-01-02 PROCEDURE — 36415 COLL VENOUS BLD VENIPUNCTURE: CPT

## 2020-01-02 PROCEDURE — 97165 OT EVAL LOW COMPLEX 30 MIN: CPT

## 2020-01-02 PROCEDURE — 74011636637 HC RX REV CODE- 636/637: Performed by: INTERNAL MEDICINE

## 2020-01-02 PROCEDURE — 74011250637 HC RX REV CODE- 250/637: Performed by: INTERNAL MEDICINE

## 2020-01-02 PROCEDURE — 74011250636 HC RX REV CODE- 250/636: Performed by: INTERNAL MEDICINE

## 2020-01-02 PROCEDURE — 82962 GLUCOSE BLOOD TEST: CPT

## 2020-01-02 PROCEDURE — 74011000250 HC RX REV CODE- 250: Performed by: INTERNAL MEDICINE

## 2020-01-02 PROCEDURE — 65270000029 HC RM PRIVATE

## 2020-01-02 PROCEDURE — 80048 BASIC METABOLIC PNL TOTAL CA: CPT

## 2020-01-02 RX ADMIN — METOPROLOL TARTRATE 50 MG: 50 TABLET, FILM COATED ORAL at 09:40

## 2020-01-02 RX ADMIN — Medication 1 AMPULE: at 21:43

## 2020-01-02 RX ADMIN — Medication 1 AMPULE: at 15:03

## 2020-01-02 RX ADMIN — PREDNISOLONE ACETATE 1 DROP: 10 SUSPENSION/ DROPS OPHTHALMIC at 09:40

## 2020-01-02 RX ADMIN — PREDNISOLONE ACETATE 1 DROP: 10 SUSPENSION/ DROPS OPHTHALMIC at 15:04

## 2020-01-02 RX ADMIN — GALANTAMINE 4 MG: 4 TABLET, FILM COATED ORAL at 09:40

## 2020-01-02 RX ADMIN — CEFTRIAXONE 1 G: 1 INJECTION, POWDER, FOR SOLUTION INTRAMUSCULAR; INTRAVENOUS at 01:22

## 2020-01-02 RX ADMIN — LEVOTHYROXINE SODIUM 100 MCG: 100 TABLET ORAL at 08:11

## 2020-01-02 RX ADMIN — Medication 5 ML: at 15:08

## 2020-01-02 RX ADMIN — PREDNISOLONE ACETATE 1 DROP: 10 SUSPENSION/ DROPS OPHTHALMIC at 17:44

## 2020-01-02 RX ADMIN — BUPROPION HYDROCHLORIDE 150 MG: 150 TABLET, EXTENDED RELEASE ORAL at 08:11

## 2020-01-02 RX ADMIN — ISOSORBIDE MONONITRATE 60 MG: 30 TABLET, EXTENDED RELEASE ORAL at 08:11

## 2020-01-02 RX ADMIN — SODIUM CHLORIDE 75 ML/HR: 900 INJECTION, SOLUTION INTRAVENOUS at 03:40

## 2020-01-02 RX ADMIN — METOPROLOL TARTRATE 50 MG: 50 TABLET, FILM COATED ORAL at 17:44

## 2020-01-02 RX ADMIN — SIMVASTATIN 40 MG: 20 TABLET, FILM COATED ORAL at 21:43

## 2020-01-02 RX ADMIN — INSULIN LISPRO 2 UNITS: 100 INJECTION, SOLUTION INTRAVENOUS; SUBCUTANEOUS at 21:42

## 2020-01-02 RX ADMIN — INSULIN LISPRO 2 UNITS: 100 INJECTION, SOLUTION INTRAVENOUS; SUBCUTANEOUS at 11:49

## 2020-01-02 RX ADMIN — Medication 5 ML: at 21:43

## 2020-01-02 RX ADMIN — INSULIN LISPRO 2 UNITS: 100 INJECTION, SOLUTION INTRAVENOUS; SUBCUTANEOUS at 17:45

## 2020-01-02 RX ADMIN — PREDNISOLONE ACETATE 1 DROP: 10 SUSPENSION/ DROPS OPHTHALMIC at 21:45

## 2020-01-02 RX ADMIN — PANTOPRAZOLE SODIUM 40 MG: 40 TABLET, DELAYED RELEASE ORAL at 08:11

## 2020-01-02 RX ADMIN — Medication 10 ML: at 05:00

## 2020-01-02 RX ADMIN — CITALOPRAM HYDROBROMIDE 20 MG: 20 TABLET ORAL at 09:39

## 2020-01-02 RX ADMIN — GALANTAMINE 4 MG: 4 TABLET, FILM COATED ORAL at 17:44

## 2020-01-02 NOTE — PROGRESS NOTES
Problem: Patient Education: Go to Patient Education Activity  Goal: Patient/Family Education  Description  1. Patient will complete lower body bathing and dressing with min A and adaptive equipment as needed. 2. Patient will complete toileting with SBA. 3. Patient will tolerate 23 minutes of OT treatment with 1-2 rest breaks to increase activity tolerance for ADLs. 4. Patient will complete functional transfers with min A and adaptive equipment as needed. 5. Patient will complete functional activity while seated edge of bed unsupported with SBA and adaptive equipment as needed. 6. Patient will tolerate 15 minutes unsupported sitting edge of bed with SBA to facilitate increased participation during ADLs. Timeframe: 7 visits        Outcome: Progressing Towards Goal       OCCUPATIONAL THERAPY: Initial Assessment, Daily Note, and AM 1/2/2020  INPATIENT:    Payor: Maddie Felder / Plan: [id number] PPO/PFFS / Product Type: [employer] ([email or web address] Care Medicare /      NAME/AGE/GENDER: Marycruz Harden is a 78 y.o. female   PRIMARY DIAGNOSIS:  Subarachnoid hematoma ([facility] [S06.6X9A] Subarachnoid hemorrhage (HCC) Subarachnoid hemorrhage (HCC)        ICD-10: Treatment Diagnosis:    Generalized Muscle Weakness (M62.81)  Difficulty in walking, Not elsewhere classified (R26.2)   Precautions/Allergies:    Fall precautions   Ace inhibitors; Aricept [donepezil]; Dextrose; Gatifloxacin; Levaquin [levofloxacin]; Macrodantin [nitrofurantoin macrocrystalline]; Morphine; Namenda [memantine]; Hurshel Paola; and Sulfa (sulfonamide antibiotics)      ASSESSMENT:     Ms. Sena Garrison presents in ICU for subarachnoid hematoma, s/p fall at [facility]. Upon arrival, pt supine in bed with family at bedside. Pt is alert, only oriented to person, and agreeable to OT evaluation. This therapist is familiar with pt from prior hospital admission for hip fx.  At baseline, pt has been requiring assistance with bathing, dressing, and grooming ADLs, however family notes pt will feed herself meals. Pt had not being using and AE d/t often forgetting to use them. Pt is a fall risk with a significant hx of falls. Today,  BUE AROM is generally decreased but WFL; LUE strength > RUE  however WFL; coordination and sensation are intact. Pt transitioned to sitting edge of bed with mod A. Static and dynamic sitting balance are fair with R lean appreciated today; family notes this to be ongoing since her fall. Pt able to self-correct with verbal and visual cueing, however continued to need prop sitting/CGA for unsupported sitting. Pt stood with mod A x gait belt. While in standing, R-sided lean continued however pt unable to correct with verbal and tactile cueing. Pt returned to sitting edge of bed for rest break. RW placed in front of patient for 2nd sit to stand trial. Pt once again stood with mod A x gait belt. Posture and static sitting balance improved with use of RW. Pt able to take side steps towards [facility] with mod A x RW x gait belt with verbal cueing. Pt returned back to supine in bed with mod A. Pt left with needs met, call light within reach, bed alarm on, and family present. At this time, Rey Michel is functioning below baseline for ADLs and functional mobility.  Pt would benefit from skilled OT services to address OT goals and plan of care    This section established at most recent assessment   PROBLEM LIST (Impairments causing functional limitations):  Decreased Strength  Decreased ADL/Functional Activities  Decreased Transfer Abilities  Decreased Ambulation Ability/Technique  Decreased Balance  Increased Pain  Decreased Activity Tolerance  Decreased Cognition   INTERVENTIONS PLANNED: (Benefits and precautions of occupational therapy have been discussed with the patient.)  Activities of daily living training  Adaptive equipment training  Balance training  Clothing management  Community reintergration  Donning&doffing training  Neuromuscular re-eduation  Re-evaluation  Therapeutic activity  Therapeutic exercise     TREATMENT PLAN: Frequency/Duration: Follow patient 3x/week to address above goals. Rehabilitation Potential For Stated Goals: Good     REHAB RECOMMENDATIONS (at time of discharge pending progress):    Placement: It is my opinion, based on this patient's performance to date, that Ms. Branham may benefit from intensive therapy at a [address] [place] [address] after discharge due to the functional deficits listed above that are likely to improve with skilled rehabilitation and concerns that he/she may be unsafe to be unsupervised at home due to decline in ability to safely perform ADls and functional mobility. .  Equipment:   TBD               OCCUPATIONAL PROFILE AND HISTORY:   History of Present Injury/Illness (Reason for Referral):  See H&P  Past Medical History/Comorbidities:   Ms. Felicita Ordonez  has a past medical history of Alzheimer's dementia ([address] Anxiety state, unspecified, CAD (coronary artery disease), Diabetes ([address] (8/27/2009), GERD (gastroesophageal reflux disease), History of snoring (7/13/2016), History of stroke (10/19/2016), Hypertension, Hypothyroid (8/27/2009), Late onset Alzheimer's disease without behavioral disturbance ([address] (07/12/2012), Mild dementia ([address] (8/30/2016), Mixed hyperlipidemia, Osteopenia, Reactive depression (5/16/2016), Retinopathy due to secondary diabetes ([address] (8/27/2009), and Sensory polyneuropathy (8/30/2016). Ms. Felicita Ordonez  has a past surgical history that includes hx cholecystectomy (1993); hx urological; hx knee arthroscopy; hx heent; hx tonsillectomy (as child); hx other surgical (2008); hx angioplasty (2009); hx other surgical; hx thyroidectomy; hx coronary artery bypass graft (2009); and hx total vaginal hysterectomy.   Social History/Living Environment:   Home Environment: [address] [place] [address] Name: [facility]  # Steps to Enter: 0  One/Two Story Residence: One story  Living Alone: No  Support Systems: Skilled nursing facility, Child(troy)  Patient Expects to be Discharged to[de-identified] Skilled nursing facility  Current DME Used/Available at Home: None  Tub or Shower Type: Shower  Prior Level of Function/Work/Activity:  Assist with bathing, dressing, and grooming; ambulatory short distances however would forget to use RW  Personal Factors:          Sex:  female        Age:  78 y.o. Past/Current Experience:  hx of falls        Other factors that influence how disability is experienced by the patient:  multiple co-morbidities    Number of Personal Factors/Comorbidities that affect the Plan of Care: Brief history (0):  LOW COMPLEXITY   ASSESSMENT OF OCCUPATIONAL PERFORMANCE[de-identified]   Activities of Daily Living:   Basic ADLs (From Assessment) Complex ADLs (From Assessment)   Feeding: Setup  Oral Facial Hygiene/Grooming: Minimum assistance  Bathing: Moderate assistance  Upper Body Dressing: Moderate assistance  Lower Body Dressing: Moderate assistance  Toileting: Moderate assistance Instrumental ADL  Meal Preparation: Total assistance  Medication Management: Total assistance   Grooming/Bathing/Dressing Activities of Daily Living     Cognitive Retraining  Safety/Judgement: Fall prevention;Decreased awareness of need for safety;Decreased awareness of need for assistance;Decreased awareness of environment;Decreased insight into deficits                       Bed/Mat Mobility  Rolling: Minimum assistance  Supine to Sit: Moderate assistance  Sit to Stand: Moderate assistance  Stand to Sit: Moderate assistance  Scooting:  Moderate assistance     Most Recent Physical Functioning:   Gross Assessment:  AROM: Generally decreased, functional  PROM: Generally decreased, functional  Strength: Generally decreased, functional  Tone: Normal  Sensation: Intact               Posture:     Balance:  Sitting: Impaired  Sitting - Static: Fair (occasional)(R lean)  Sitting - Dynamic: Fair (occasional)  Standing: Impaired  Standing - Static: Fair  Standing - Dynamic : Poor Bed Mobility:  Rolling: Minimum assistance  Supine to Sit: Moderate assistance  Scooting: Moderate assistance  Wheelchair Mobility:     Transfers:  Sit to Stand: Moderate assistance  Stand to Sit: Moderate assistance            Patient Vitals for the past 6 hrs:   BP SpO2 Pulse   20 0600 103/54 96 % 68   20 0630 108/55 97 % 68   20 0700 121/59 99 % 91   20 0731 143/62 98 % 68   20 0800 108/53 97 % 68   20 0900 116/54 97 % 68   20 1000 125/58 100 % 72   20 1057 -- 100 % --   20 1100 104/64 -- 78       Mental Status  Neurologic State: Alert  Orientation Level: Oriented to person  Cognition: Follows commands, Decreased attention/concentration  Perception: Cues to maintain midline in sitting, Cues to maintain midline in standing  Perseveration: No perseveration noted  Safety/Judgement: Fall prevention, Decreased awareness of need for safety, Decreased awareness of need for assistance, Decreased awareness of environment, Decreased insight into deficits                          Physical Skills Involved:  Balance  Strength  Activity Tolerance  Gross Motor Control  Pain (acute) Cognitive Skills Affected (resulting in the inability to perform in a timely and safe manner):  none  Psychosocial Skills Affected:  Habits/Routines  Environmental Adaptation   Number of elements that affect the Plan of Care: 5+:  HIGH COMPLEXITY   CLINICAL DECISION MAKIN [facility] [address] [id number] AM-PAC 6 Clicks   Daily Activity Inpatient Short Form  How much help from another person does the patient currently need. .. Total A Lot A Little None   1. Putting on and taking off regular lower body clothing? [] 1   [x] 2   [] 3   [] 4   2. Bathing (including washing, rinsing, drying)? [] 1   [x] 2   [] 3   [] 4   3. Toileting, which includes using toilet, bedpan or urinal?   [] 1   [x] 2   [] 3   [] 4   4.   Putting on and taking off regular upper body clothing? [] 1   [x] 2   [] 3   [] 4   5. Taking care of personal grooming such as brushing teeth? [] 1   [] 2   [x] 3   [] 4   6. Eating meals? [] 1   [] 2   [x] 3   [] 4   © 2007, Trustees of [address] [place] [address] [address] [id number], under license to [employer]. All rights reserved      Score:  Initial: 14 Most Recent: X (Date: -- )    Interpretation of Tool:  Represents activities that are increasingly more difficult (i.e. Bed mobility, Transfers, Gait). Medical Necessity:     Patient demonstrates   good   rehab potential due to higher previous functional level. Reason for Services/Other Comments:  Patient continues to require skilled intervention due to   medical complications and patient unable to attend/participate in therapy as expected  . Use of outcome tool(s) and clinical judgement create a POC that gives a: LOW COMPLEXITY         TREATMENT:   (In addition to Assessment/Re-Assessment sessions the following treatments were rendered)     Pre-treatment Symptoms/Complaints:  \" I feel much better today. .. I was out of it this morning. \"  Pain: Initial:   Pain Intensity 1: 0 /10 Post Session:  same     Therapeutic Activity: (    10 minutes): Therapeutic activities including Bed transfers and Ambulation on level ground to improve mobility, strength, balance, and coordination. Required moderate   to promote static and dynamic balance in standing. Pt transitioned to sitting edge of bed with mod A. Static and dynamic sitting balance are fair with R lean appreciated today; family notes this to be ongoing since her fall. Pt able to self-correct with verbal and visual cueing, however continued to need prop sitting/CGA for unsupported sitting. Pt stood with mod A x gait belt. While in standing, R-sided lean continued however pt unable to correct with verbal and tactile cueing. Pt returned to sitting edge of bed for rest break.  RW placed in front of patient for 2nd sit to stand trial. Pt once again stood with mod A x gait belt. Posture and static sitting balance improved with use of RW. Pt able to take side steps towards [facility] with mod A x RW x gait belt with verbal cueing. Braces/Orthotics/Lines/Etc:   ICU monitors   O2 Device: Room air  Treatment/Session Assessment:    Response to Treatment:  tolerated well with no issues noted. Interdisciplinary Collaboration:   Occupational Therapist  Registered Nurse  After treatment position/precautions:   Supine in bed  Bed alarm/tab alert on  Bed in low position  Call light within reach  Family at bedside   Compliance with Program/Exercises: Will assess as treatment progresses. Recommendations/Intent for next treatment session: \"Next visit will focus on advancements to more challenging activities and reduction in assistance provided\".   Total Treatment Duration:  OT Patient Time In/Time Out  Time In: 1110  Time Out: [address] [facility]

## 2020-01-02 NOTE — PROGRESS NOTES
Bedside shift report from [address] [place] [address]. Dual neuro assessment complete. See flow sheet.

## 2020-01-02 NOTE — PROGRESS NOTES
Hospitalist Note     Admit Date:  2020 12:42 AM   Name:  Davis Talbot   Age:  78 y.o.  :  1940   MRN:  [id number]   PCP:  Jacques Yarbrough MD  Treatment Team: Attending Provider: Vivek Denny MD; Charge Nurse: Darren Kelly RN; Occupational Therapist: Darryn Joseph OT; Care Manager: Sharon Jolly RN    HPI/Subjective:   Ms. Jessy Brunner is a nice 77 y/o WF with a h/o dementia, CAD s/p CABG (on ASA/Plavix), CVA, HTN, DM2 who was presented to [facility] on  with a fall, CT showed left SAH. NSG contacted and requested CTA. Hospitalist admitted and transferred to [facility].     1/2: In bed, slept well, no distress, no headache/visual changes. More communicative but still intermittently confused. ROS neg otherwise.     No other complaints  Objective:     Patient Vitals for the past 24 hrs:   Temp Pulse Resp BP SpO2   20 0630  68 14 108/55 97 %   20 0600  68 14 103/54 96 %   20 0530  68 16 100/51 96 %   20 0500  69 13 118/59 99 %   20 0430  69 15 112/56 98 %   20 0400  71 17 121/60 97 %   20 0330  71 16 116/56 98 %   20 0300 97.3 °F (36.3 °C) 67 13 101/51 97 %   20 0230  68 13 105/51 97 %   20 0201  77 18 134/67 95 %   20 0200  81 16 134/67 96 %   20 0129  69 16 110/51 99 %   20 0100  67 14 104/55 97 %   20 0030  69 13 104/52 97 %   20 0000  71 16 108/51 97 %   20 2359  71 16  97 %   20 2300 97.7 °F (36.5 °C) 91 20 107/54 100 %   20 2245  77 30 109/51 99 %   20 2230  74 20 108/54 97 %   20 2215  75 20 112/58 100 %   20 2200  73 16 104/57 98 %   20 2145  70 20 111/55 100 %   20 2130  68 18 114/72 98 %   20 2100  69 25 100/53 99 %   205  70 15 125/55 98 %   20  70 18 116/52 98 %   20  80 21 116/70 97 %   20 1930  83 23 108/53    20 1916   17     20 1915  85  106/49 96 %   20 1900 99 °F (37.2 °C) 87 21 123/57 97 %   01/01/20 1845  82 20 113/53 97 %   01/01/20 1830  83 20 125/52 95 %   01/01/20 1814   20 134/71 99 %   01/01/20 1813  83      01/01/20 1800  82 17 118/59 95 %   01/01/20 1746  80 24 114/56 97 %   01/01/20 1731  81  119/70 98 %   01/01/20 1714  80 17 111/53 97 %   01/01/20 1700  76 22 104/52 98 %   01/01/20 1645  77 19 109/55 97 %   01/01/20 1630  78 24 106/53 95 %   01/01/20 1614  83 21 126/65 97 %   01/01/20 1600  85  119/57 100 %   01/01/20 1558   17     01/01/20 1546   21     01/01/20 1545  79 29 117/57 98 %   01/01/20 1530  77 19 116/56 97 %   01/01/20 1519   16     01/01/20 1514  79  124/56 95 %   01/01/20 1500 97.8 °F (36.6 °C) 78 14 127/58 97 %   01/01/20 1448   20     01/01/20 1445  80  112/59 97 %   01/01/20 1431  82 24 116/55 96 %   01/01/20 1414  80 21 116/58 96 %   01/01/20 1400  79 21 107/52 95 %   01/01/20 1345  79 22 114/58 96 %   01/01/20 1331   22     01/01/20 1330  81  106/55 96 %   01/01/20 1314  79 17 117/57 96 %   01/01/20 1300  82  131/61 99 %   01/01/20 1247   17     01/01/20 1245  81  105/53 98 %   01/01/20 1231   19     01/01/20 1230  88  109/56 97 %   01/01/20 1215 97.6 °F (36.4 °C) 80 19 107/59 97 %   01/01/20 1159  78 12 109/55 96 %   01/01/20 1145  75 23 112/56 96 %   01/01/20 1130  75  114/55 100 %   01/01/20 1115  77  119/56 100 %   01/01/20 1114   19     01/01/20 1100  75 20 115/54 99 %   01/01/20 1045  97 23 113/54 100 %   01/01/20 1030  73 17 113/53 100 %   01/01/20 1015  78 20 131/60 98 %   01/01/20 1000  85 20 122/53 98 %   01/01/20 0959  85 25 122/53 98 %   01/01/20 0945  89 16 110/65 94 %   01/01/20 0931  90 21 127/86 98 %   01/01/20 0915  86 24 123/59 98 %   01/01/20 0900  90 20 139/61 98 %   01/01/20 0859  90  139/61 93 %   01/01/20 0858   20     01/01/20 0845  87 18 136/60 99 %   01/01/20 0831  81  143/66 100 %   01/01/20 0815  79 20 128/57 97 %   01/01/20 0800  84 20 105/54 98 %   01/01/20 0759  84 25 105/54 98 %     Oxygen Therapy  O2 Sat (%): 97 % (01/02/20 0630)  Pulse via Oximetry: 69 beats per minute (01/02/20 0630)  O2 Device: Room air (01/02/20 0300)    Estimated body mass index is 22.67 kg/m² as calculated from the following:    Height as of 11/25/19: 5' 3\" (1.6 m). Weight as of 12/31/19: 58.1 kg (128 lb). Intake/Output Summary (Last 24 hours) at 1/2/2020 0745  Last data filed at 1/2/2020 0559  Gross per 24 hour   Intake 2051.45 ml   Output 570 ml   Net 1481.45 ml       *Note that automatically entered I/Os may not be accurate; dependent on patient compliance with collection and accurate  by techs. General:    Well nourished. Alert. Pleasantly confused. Eyes:  Right periorbital ecchymosis. CV:   RRR. No murmur, rub, or gallop. Lungs:   CTAB. No wheezing, rhonchi, or rales. Abdomen:   Soft, nontender, nondistended. Extremities: Warm and dry. No cyanosis or edema. Skin:     No rashes or jaundice.    Neuro:  No gross focal deficits    Data Review:  I have reviewed all labs, meds, and studies from the last 24 hours:    Recent Results (from the past 24 hour(s))   GLUCOSE, POC    Collection Time: 01/01/20  8:15 AM   Result Value Ref Range    Glucose (POC) 112 (H) 65 - 100 mg/dL   GLUCOSE, POC    Collection Time: 01/01/20 11:54 AM   Result Value Ref Range    Glucose (POC) 149 (H) 65 - 100 mg/dL   GLUCOSE, POC    Collection Time: 01/01/20  5:49 PM   Result Value Ref Range    Glucose (POC) 112 (H) 65 - 100 mg/dL   GLUCOSE, POC    Collection Time: 01/01/20 10:54 PM   Result Value Ref Range    Glucose (POC) 175 (H) 65 - 100 mg/dL   PLEASE READ & DOCUMENT PPD TEST IN 24 HRS    Collection Time: 01/02/20  2:03 AM   Result Value Ref Range    PPD Negative Negative    mm Induration 0 0 - 5 mm   METABOLIC PANEL, BASIC    Collection Time: 01/02/20  3:24 AM   Result Value Ref Range    Sodium 142 136 - 145 mmol/L    Potassium 3.8 3.5 - 5.1 mmol/L    Chloride 111 (H) 98 - 107 mmol/L    CO2 26 21 - 32 mmol/L    Anion gap 5 (L) 7 - 16 mmol/L    Glucose 72 65 - 100 mg/dL    BUN 20 8 - 23 MG/DL    Creatinine 0.93 0.6 - 1.0 MG/DL    GFR est AA >60 >60 ml/min/1.73m2    GFR est non-AA >60 >60 ml/min/1.73m2    Calcium 8.7 8.3 - 10.4 MG/DL        All Micro Results     Procedure Component Value Units Date/Time    CULTURE, URINE [[id number]]     Order Status:  Sent Specimen:  Urine from Clean catch           Current Meds:  Current Facility-Administered Medications   Medication Dose Route Frequency    [START ON 1/3/2020] cefTRIAXone (ROCEPHIN) 1 g in 0.9% sodium chloride (MBP/ADV) 50 mL  1 g IntraVENous Q24H    buPROPion SR (WELLBUTRIN SR) tablet 150 mg  150 mg Oral 7am    citalopram (CELEXA) tablet 20 mg  20 mg Oral DAILY    galantamine (RAZADYNE) tablet 4 mg  4 mg Oral BID WITH MEALS    levothyroxine (SYNTHROID) tablet 100 mcg  100 mcg Oral ACB    isosorbide mononitrate ER (IMDUR) tablet 60 mg  60 mg Oral 7am    metoprolol tartrate (LOPRESSOR) tablet 50 mg  50 mg Oral BID    pantoprazole (PROTONIX) tablet 40 mg  40 mg Oral ACB    prednisoLONE acetate (PRED FORTE) 1 % ophthalmic suspension 1 Drop  1 Drop Both Eyes QID    simvastatin (ZOCOR) tablet 40 mg  40 mg Oral QHS    sodium chloride (NS) flush 5-40 mL  5-40 mL IntraVENous Q8H    sodium chloride (NS) flush 5-40 mL  5-40 mL IntraVENous PRN    acetaminophen (TYLENOL) tablet 650 mg  650 mg Oral Q6H PRN    ondansetron (ZOFRAN) injection 4 mg  4 mg IntraVENous Q4H PRN    insulin lispro (HUMALOG) injection   SubCUTAneous AC&HS       Other Studies:  No results found for this visit on 01/01/20. No results found.     Assessment and Plan:     Hospital Problems as of 1/2/2020 Date Reviewed: 4/20/2017          Codes Class Noted - Resolved POA    UTI (urinary tract infection) ICD-10-CM: N39.0  ICD-9-CM: 599.0  1/1/2020 - Present Yes        Subarachnoid hematoma ([facility] ICD-10-CM: Y76.8K0X  ICD-9-CM: 852.00  1/1/2020 - Present Yes        GIANLUCA (acute kidney injury) ([facility] ICD-10-CM: N17.9  ICD-9-CM: 584.9  1/1/2020 - Present Yes        * (Principal) Subarachnoid hemorrhage ([facility] ICD-10-CM: I60.9  ICD-9-CM: 202  12/31/2019 - Present Yes        Anemia (Chronic) ICD-10-CM: D64.9  ICD-9-CM: 285.9  12/31/2019 - Present Yes        Facial laceration ICD-10-CM: H03.12PF  ICD-9-CM: 873.40  12/31/2019 - Present Yes        CVA (cerebral vascular accident) ([facility] (Chronic) ICD-10-CM: I63.9  ICD-9-CM: 434.91  12/31/2019 - Present Yes        Late onset Alzheimer's disease without behavioral disturbance ([facility] (Chronic) ICD-10-CM: G30.1, F02.80  ICD-9-CM: 331.0, 294.10  7/12/2017 - Present Yes        Controlled type 2 diabetes mellitus with complication, without long-term current use of insulin (HCC) ICD-10-CM: E11.8  ICD-9-CM: 250.90  5/16/2016 - Present Yes        Hypothyroidism, adult (Chronic) ICD-10-CM: E03.9  ICD-9-CM: 244.9  9/23/2015 - Present Yes        CAD (coronary artery disease) (Chronic) ICD-10-CM: I25.10  ICD-9-CM: 414.00  8/27/2009 - Present Yes        Essential hypertension (Chronic) ICD-10-CM: I10  ICD-9-CM: 401.9  8/27/2009 - Present Yes              Plan:  # Subarachnoid hemorrhage              - Repeat CT unchanged. CTA w/o aneurysm   - Appreciate neurosurgery   - BPs at goal. Blood thinners held.     # GIANLUCA              - Resolved, dc IVFs.    # UTI              - Culture sent. Ceftriaxone x3d total.     # HTN              - Con't home meds     # Dementia/depression              - Home meds     # DM2              - SSI. Metformin held.     # CAD s/p CABG              - ASA/Plavix held with [facility]    DC planning/Dispo: Transfer to floor today. Has PPD, add CM/PT consults. Diet:  DIET DIABETIC CONSISTENT CARB  DVT ppx: SCDs only.      Signed:  Tanya Sorenson MD

## 2020-01-02 NOTE — PROGRESS NOTES
LMSW follow up with pt and daughter at bedside. Daughter has determined that she would prefer pt to return to her [address] Unit if possible. Family will will contact [facility] in am to discuss pt return to intermediate and they have this LMSW phone contact to have [facility] staff call LMSW to coordinate pt care.   Family understands that if TACHO feels that pt cannot return to that level of care then we will need to pursue SN[facility]

## 2020-01-02 NOTE — INTERDISCIPLINARY ROUNDS
inInterdisciplinary team rounds were held 1/2/2020 with the following team members:, Nursing, Nurse Practitioner, Nutrition, Outcomes Management, Palliative Care, Physical Therapy, Physician and Clinical Coordinator and the patient. Plan of care discussed. See clinical pathway and/or care plan for interventions and desired outcomes.

## 2020-01-02 NOTE — PROGRESS NOTES
[facility] performed. Patient alert, oriented to self, disoriented to place, time and situation. Speech is clear. Follows commands on all 4 extremities. No drift present. Pupils round and brisk. States no pain at time.     Temp:99  NSR  BP: 120s/50s  Room Air

## 2020-01-02 NOTE — PROGRESS NOTES
A follow up visit was made to the patient. Emotional support, spiritual presence and   prayer were provided for the patient and her daughter.       TIAM Gómez

## 2020-01-02 NOTE — PROGRESS NOTES
TRANSFER - IN REPORT:    [facility] [address]  being received from ICU for routine progression of care      Report consisted of patients Situation, Background, Assessment and   Recommendations(SBAR). Information from the following report(s) SBAR, Kardex, Intake/Output and Recent Results was reviewed with the receiving nurse. Opportunity for questions and clarification was provided. Assessment completed upon patients arrival to unit and care assumed.

## 2020-01-02 NOTE — PROGRESS NOTES
TRANSFER - OUT REPORT:    Verbal report given to Deann(name) on Keena PAGAN [place]  being transferred to [facility](unit) for routine progression of care       Report consisted of patients Situation, Background, Assessment and   Recommendations(SBAR). Information from the following report(s) Kardex, ED Summary, Accordion and Dual Neuro Assessment was reviewed with the receiving nurse. Lines:   Peripheral IV 01/01/20 Posterior;Right Forearm (Active)   Site Assessment Clean, dry, & intact 1/2/2020  8:30 AM   Phlebitis Assessment 0 1/2/2020  8:30 AM   Infiltration Assessment 0 1/2/2020  3:00 AM   Dressing Status Clean, dry, & intact 1/2/2020  8:30 AM   Dressing Type Transparent 1/2/2020  8:30 AM   Hub Color/Line Status Patent; Flushed; Infusing 1/2/2020  3:00 AM   Alcohol Cap Used No 1/2/2020  3:00 AM       Peripheral IV 01/01/20 Anterior;Left;Proximal Forearm (Active)   Site Assessment Clean, dry, & intact 1/2/2020  8:30 AM   Phlebitis Assessment 0 1/2/2020  8:30 AM   Infiltration Assessment 0 1/2/2020  3:00 AM   Dressing Status Clean, dry, & intact 1/2/2020  8:30 AM   Dressing Type Transparent 1/2/2020  8:30 AM   Hub Color/Line Status Patent; Flushed 1/2/2020  3:00 AM   Alcohol Cap Used No 1/2/2020  3:00 AM        Opportunity for questions and clarification was provided.       Patient transported with:   Patient's medications from home  Registered Nurse

## 2020-01-02 NOTE — PROGRESS NOTES
Pt seen in ICU s/p admission SAH/CVA. Currently alert. Daughters at bedside confirms demographics. Discussed possible need for STR as pt currently lives in memory care unit at [facility]. PT/OT/Speech following. Long discussion regarding possible need for STR to LTC. Daughters would like [facility] as first choice, but understand that facility does not accept [employer]. They will call to discuss with admission there prior to sending referral. Second choice is [facility]. List given. Pt has been at [facility] recently. PPD for potential rehab needs. CM following. Care Management Interventions  PCP Verified by CM: Yes(confirmsDr. Chalo Valdez)  Mode of Transport at Discharge:  Other (see comment)  Transition of Care Consult (CM Consult): Discharge Planning  Discharge Durable Medical Equipment: (w/c at [facility])  Current Support Network: Assisted Living(pt lives at [facility], memory care, Edilson forte -[phone]/Mely -[phone])  Confirm Follow Up Transport: Family  The Plan for Transition of Care is Related to the Following Treatment Goals : possibly STR  The Patient and/or Patient Representative was Provided with a Choice of Provider and Agrees with the Discharge Plan?: Yes  Name of the Patient Representative Who was Provided with a Choice of Provider and Agrees with the Discharge Plan: Edilson Forte and [facility] of Choice List was Provided with Basic Dialogue that Supports the Patient's Individualized Plan of Care/Goals, Treatment Preferences and Shares the Quality Data Associated with the Providers?: Yes  The [employer] Information Provided?: (confirms [facility]  [place]charge Location  Discharge Placement: Skilled nursing facility

## 2020-01-03 VITALS
HEART RATE: 71 BPM | OXYGEN SATURATION: 99 % | RESPIRATION RATE: 17 BRPM | TEMPERATURE: 98 F | DIASTOLIC BLOOD PRESSURE: 62 MMHG | SYSTOLIC BLOOD PRESSURE: 130 MMHG

## 2020-01-03 LAB
ANION GAP SERPL CALC-SCNC: 2 MMOL/L (ref 7–16)
BUN SERPL-MCNC: 19 MG/DL (ref 8–23)
CALCIUM SERPL-MCNC: 8.7 MG/DL (ref 8.3–10.4)
CHLORIDE SERPL-SCNC: 112 MMOL/L (ref 98–107)
CO2 SERPL-SCNC: 29 MMOL/L (ref 21–32)
CREAT SERPL-MCNC: 0.95 MG/DL (ref 0.6–1)
GLUCOSE BLD STRIP.AUTO-MCNC: 94 MG/DL (ref 65–100)
GLUCOSE SERPL-MCNC: 96 MG/DL (ref 65–100)
MM INDURATION POC: 0 MM (ref 0–5)
POTASSIUM SERPL-SCNC: 4 MMOL/L (ref 3.5–5.1)
PPD POC: NEGATIVE NEGATIVE
SODIUM SERPL-SCNC: 143 MMOL/L (ref 136–145)

## 2020-01-03 PROCEDURE — 36415 COLL VENOUS BLD VENIPUNCTURE: CPT

## 2020-01-03 PROCEDURE — 77030020256 HC SOL INJ NACL 0.9%  500ML

## 2020-01-03 PROCEDURE — 74011250636 HC RX REV CODE- 250/636: Performed by: INTERNAL MEDICINE

## 2020-01-03 PROCEDURE — 74011250637 HC RX REV CODE- 250/637: Performed by: INTERNAL MEDICINE

## 2020-01-03 PROCEDURE — 74011000258 HC RX REV CODE- 258: Performed by: INTERNAL MEDICINE

## 2020-01-03 PROCEDURE — 82962 GLUCOSE BLOOD TEST: CPT

## 2020-01-03 PROCEDURE — 80048 BASIC METABOLIC PNL TOTAL CA: CPT

## 2020-01-03 RX ADMIN — CITALOPRAM HYDROBROMIDE 20 MG: 20 TABLET ORAL at 08:44

## 2020-01-03 RX ADMIN — BUPROPION HYDROCHLORIDE 150 MG: 150 TABLET, EXTENDED RELEASE ORAL at 05:46

## 2020-01-03 RX ADMIN — Medication 1 AMPULE: at 08:43

## 2020-01-03 RX ADMIN — METOPROLOL TARTRATE 50 MG: 50 TABLET, FILM COATED ORAL at 08:44

## 2020-01-03 RX ADMIN — Medication 5 ML: at 05:47

## 2020-01-03 RX ADMIN — PREDNISOLONE ACETATE 1 DROP: 10 SUSPENSION/ DROPS OPHTHALMIC at 08:46

## 2020-01-03 RX ADMIN — GALANTAMINE 4 MG: 4 TABLET, FILM COATED ORAL at 08:44

## 2020-01-03 RX ADMIN — LEVOTHYROXINE SODIUM 100 MCG: 100 TABLET ORAL at 05:46

## 2020-01-03 RX ADMIN — CEFTRIAXONE 1 G: 1 INJECTION, POWDER, FOR SOLUTION INTRAMUSCULAR; INTRAVENOUS at 01:25

## 2020-01-03 RX ADMIN — PANTOPRAZOLE SODIUM 40 MG: 40 TABLET, DELAYED RELEASE ORAL at 05:46

## 2020-01-03 RX ADMIN — ISOSORBIDE MONONITRATE 60 MG: 30 TABLET, EXTENDED RELEASE ORAL at 05:46

## 2020-01-03 NOTE — DISCHARGE SUMMARY
Hospitalist Discharge Summary     Admit Date:  2020 12:42 AM   Name:  Marycruz Harden   Age:  78 y.o.  :  1940   MRN:  [id number]   PCP:  Carlos Rice MD  Treatment Team: Attending Provider: Sergio Posada MD; Care Manager: Shira Aviles RN; Utilization Review: Kathy Solis RN; Charge Nurse: Emery King; Nurse Practitioner: Sergey Emery NP; Physical Therapist: Fausto Lagunas PT, DPT    Problem List for this Hospitalization:  Hospital Problems as of 1/3/2020 Date Reviewed: 2017          Codes Class Noted - Resolved POA    UTI (urinary tract infection) ICD-10-CM: N39.0  ICD-9-CM: 599.0  2020 - Present Yes        Subarachnoid hematoma ([facility] ICD-10-CM: X61.0Q9H  ICD-9-CM: 852.00  2020 - Present Yes        GIANLUCA (acute kidney injury) ([facility] ICD-10-CM: N17.9  ICD-9-CM: 584.9  2020 - Present Yes        * (Principal) Subarachnoid hemorrhage ([facility] ICD-10-CM: I60.9  ICD-9-CM: 199  2019 - Present Yes        Anemia (Chronic) ICD-10-CM: D64.9  ICD-9-CM: 285.9  2019 - Present Yes        Facial laceration ICD-10-CM: E43.79FX  ICD-9-CM: 873.40  2019 - Present Yes        CVA (cerebral vascular accident) ([facility] (Chronic) ICD-10-CM: I63.9  ICD-9-CM: 434.91  2019 - Present Yes        Late onset Alzheimer's disease without behavioral disturbance ([facility] (Chronic) ICD-10-CM: G30.1, F02.80  ICD-9-CM: 331.0, 294.10  2017 - Present Yes        Controlled type 2 diabetes mellitus with complication, without long-term current use of insulin ([facility] ICD-10-CM: E11.8  ICD-9-CM: 250.90  2016 - Present Yes        Hypothyroidism, adult (Chronic) ICD-10-CM: E03.9  ICD-9-CM: 244.9  2015 - Present Yes        CAD (coronary artery disease) (Chronic) ICD-10-CM: I25.10  ICD-9-CM: 414.00  2009 - Present Yes        Essential hypertension (Chronic) ICD-10-CM: I10  ICD-9-CM: 401.9  2009 - Present Yes                Admission HPI from 2020:    Ms. Sena Garrison is a nice 79 y/o WF with a h/o dementia, CAD s/p CABG (on ASA/Plavix), CVA, HTN, DM2 who was presented to [facility] on 12/31 with a fall, CT showed left SAH. NSG contacted and requested CTA. Hospitalist admitted and transferred to [facility]. Hospital Course:  1/3:  Patient alert to self, baseline. CTA shows no aneurysm. No surgical intervention per neurosurgery note. Can go to [facility] care today. Family and CM aware. Due to fall risk and now with SAH, no resuming Plavix or ASA. Follow up instructions and discharge meds at bottom of this note. Plan was discussed with patient, family, CM. All questions answered. Patient was stable at time of discharge. 10 systems reviewed and negative except as noted in HPI. Diagnostic Imaging/Tests:   Ct Head Wo Cont    Result Date: 1/1/2020  CT HEAD WITHOUT CONTRAST HISTORY:  CVA. COMPARISON: None TECHNIQUE: Axial imaging was performed without intravenous contrast utilizing 5mm slice thickness. Sagittal and coronal reformats were performed. Radiation dose reduction techniques were used for this study. Our CT scanner uses one or all of the following: Automated exposure control, adjustment of the MAS or KUB according to patient's size and iterative reconstruction. FINDINGS:    *BRAIN:    -  There are no early signs of territorial or lacunar infarction by CT.    -  Left frontotemporal subarachnoid hemorrhage unchanged. Also consider the possibility of a hemorrhagic infarct.    -  No gross white matter abnormality by CT. *VISUALIZED PARANASAL SINUSES: Well aerated. *MASTOIDS:  Clear. *CALVARIUM AND SCALP: Unremarkable. IMPRESSION: No interval change. Date of Dictation: 1/1/2020 5:12 AM     Cta Code Neuro Head And Neck W Cont    Result Date: 1/1/2020  Title:  CT arteriogram of the neck and head.   Indication: Subarachnoid hematoma (HCC)  Technique: Axial images of the neck and head were obtained after the uneventful administration of intravenous iodinated contrast media. Contrast was used to best identify the arterial structures. Images were reviewed on a separate, free standing, three-dimensional workstation as per the referring physicians request.  All stenosis percentages derived by comparing the narrowest segment with the distal Internal Carotid Artery luminal diameter, as described in the [employer] Symptomatic Carotid Endarterectomy Trial (NASCET) criteria. All CT scans at this facility are performed using dose reduction/dose modulation techniques, as appropriate the performed exam, including the following: Automated Exposure Control; Adjustment of the mA and/or kV according to patient size (this includes techniques or standardized protocols for targeted exams where dose is matched to indication/reason for exam); and Use of Iterative Reconstruction Technique. Comparison: CT 1/1/2020. Findings: Lungs:  No focal consolidation or pleural effusions. No suspicious pulmonary nodules. .  Bones:  No osseous destruction. . Prior median sternotomy. Paranasal sinuses:  Paranasal sinuses are clear. .  Brain:  Redemonstrated subarachnoid hemorrhage along the left sylvian fissure. See recent head CT for further evaluation. No enhancing mass lesion. .  Soft tissues:  Within normal limits. .  Dural venous sinuses:  Patent. Aortic arch:  Mild to moderate diffuse calcific atherosclerosis. Right brachiocephalic artery:  No significant stenosis or occlusion. .  . Right subclavian artery:  No significant stenosis or occlusion. .  . Left subclavian artery:  No significant stenosis or occlusion. .  . Right common carotid artery:  No significant stenosis or occlusion. .  . Right external carotid artery:  No significant stenosis or occlusion. .  . Right internal carotid artery:  No significant stenosis or occlusion. .  . Left common carotid artery: No significant stenosis or occlusion. .  . Left external carotid artery:  No significant stenosis or occlusion. .  .   Left internal carotid artery: No significant stenosis or occlusion. . Moderate calcific atherosclerosis of the carotid bulb without significant stenosis. Right vertebral artery:  No significant stenosis or occlusion. .. Mild intracranial stenosis secondary to atherosclerosis. Left vertebral artery:  No significant stenosis or occlusion. . Mild intracranial stenosis secondary to atherosclerosis. Basilar artery:  No significant stenosis, occlusion, or aneurysm. .  Right middle cerebral artery:  No significant stenosis, occlusion, or aneurysm. Right anterior cerebral artery:  No significant stenosis, occlusion, or aneurysm. Mabeline Sink Anterior communicating artery: No significant stenosis, occlusion, or aneurysm. Mabeline Sink Left middle cerebral artery:  No significant stenosis, occlusion, or aneurysm. Mabeline Sink Left anterior cerebral artery:  No significant stenosis, occlusion, or aneurysm. .  Right posterior communicating artery: No significant stenosis, occlusion, or aneurysm. Mabeline Sink Left posterior communicating artery:  No significant stenosis, occlusion, or aneurysm. .  Right posterior cerebral artery:  No significant stenosis, occlusion, or aneurysm. Mabeline Sink Left posterior cerebral artery:  No significant stenosis, occlusion, or aneurysm. .      Impression: 1. No aneurysm identified. No acute arterial cutoff or occlusion. 2.  Mild to moderate calcific atherosclerosis without high-grade stenosis. Echocardiogram results:  No results found for this visit on 01/01/20.       All Micro Results     Procedure Component Value Units Date/Time    CULTURE, URINE [[id number]]     Order Status:  Sent Specimen:  Urine from Clean catch           Labs: Results:       BMP, Mg, Phos Recent Labs     01/03/20  0539 01/02/20  0324 12/31/19  2319    142 135*   K 4.0 3.8 4.3   * 111* 100   CO2 29 26 28   AGAP 2* 5* 7   BUN 19 20 30*   CREA 0.95 0.93 1.51*   CA 8.7 8.7 10.1   GLU 96 72 177*      CBC Recent Labs     12/31/19  2319   WBC 12.2*   RBC 3.91*   HGB 11.3*   HCT 35.9      GRANS 70 LYMPH 16   EOS 2   MONOS 12   BASOS 0   IG 0   ANEU 8.6*   ABL 1.9   KENNETH 0.2   ABM 1.5*   ABB 0.0   AIG 0.0      LFT Recent Labs     12/31/19  2319   SGOT 10*   ALT 8*      TP 7.0   ALB 3.6   GLOB 3.4   AGRAT 1.1*      Cardiac Testing Lab Results   Component Value Date/Time    CK 18 (L) 11/28/2009 08:15 PM    CK 15 (L) 11/27/2009 11:55 AM    CK 21 11/27/2009 03:34 AM    CK - MB <0.5 11/28/2009 08:15 PM    CK - MB <0.5 11/27/2009 11:55 AM    CK - MB <0.5 11/27/2009 03:34 AM    CK-MB Index CANNOT BE CALCULATED 11/28/2009 08:15 PM    CK-MB Index CANNOT BE CALCULATED 11/27/2009 11:55 AM    CK-MB Index CANNOT BE CALCULATED 11/27/2009 03:34 AM    Troponin-I, Qt. 0.14 (H) 11/28/2009 08:15 PM    Troponin-I, Qt. 0.18 (H) 11/27/2009 07:40 PM    Troponin-I, Qt. 0.15 (H) 11/27/2009 11:55 AM      Coagulation Tests Lab Results   Component Value Date/Time    Prothrombin time 13.0 11/25/2019 04:26 AM    Prothrombin time 11.7 (H) 08/28/2009 07:20 PM    INR 1.0 11/25/2019 04:26 AM    INR 1.2 08/28/2009 07:20 PM    aPTT 30.0 12/01/2009 04:30 AM    aPTT 51.8 (H) 11/30/2009 05:28 AM    aPTT 56.7 (H) 11/29/2009 10:25 AM      A1c Lab Results   Component Value Date/Time    Hemoglobin A1c 6.4 (H) 09/26/2018 04:03 PM    Hemoglobin A1c 6.8 (H) 01/31/2018 10:33 AM    Hemoglobin A1c 6.8 (H) 09/21/2017 11:12 AM    Hemoglobin A1c, External 6.3 06/16/2015      Lipid Panel Lab Results   Component Value Date/Time    Cholesterol, total 162 01/31/2018 10:33 AM    HDL Cholesterol 76 01/31/2018 10:33 AM    LDL, calculated 57 01/31/2018 10:33 AM    VLDL, calculated 29 01/31/2018 10:33 AM    Triglyceride 143 01/31/2018 10:33 AM      Thyroid Panel Lab Results   Component Value Date/Time    TSH 0.205 (L) 01/31/2018 10:33 AM    TSH 0.911 01/17/2017 10:20 AM    T4, Total 8.2 05/16/2016 04:27 PM        Most Recent UA Lab Results   Component Value Date/Time    Color YELLOW 11/25/2019 04:57 AM    Appearance CLEAR 11/25/2019 04:57 AM    Specific gravity 1.010 11/25/2019 04:57 AM    pH (UA) 6.5 11/25/2019 04:57 AM    Protein NEGATIVE  11/25/2019 04:57 AM    Glucose NEGATIVE  11/25/2019 04:57 AM    Ketone NEGATIVE  11/25/2019 04:57 AM    Bilirubin NEGATIVE  11/25/2019 04:57 AM    Blood MODERATE (A) 11/25/2019 04:57 AM    Urobilinogen 0.2 11/25/2019 04:57 AM    Nitrites NEGATIVE  11/25/2019 04:57 AM    Leukocyte Esterase NEGATIVE  11/25/2019 04:57 AM        Allergies   Allergen Reactions    Ace Inhibitors Cough    Aricept [Donepezil] Diarrhea    Dextrose Unknown (comments)    Gatifloxacin Other (comments)     HYPERTENSION    Levaquin [Levofloxacin] Hives and Other (comments)     Causes blood sugar to \"bottom out\"    Macrodantin [Nitrofurantoin Macrocrystalline] Diarrhea    Morphine Other (comments)     Urinary retention      Namenda [Memantine] Nausea Only    Carmencita Hives     All Carmencita class of drugs    Sulfa (Sulfonamide Antibiotics) Unknown (comments)     Immunization History   Administered Date(s) Administered    Influenza High Dose Vaccine PF 10/12/2016, 09/20/2017, 09/26/2018    Influenza Vaccine 10/08/2012, 09/23/2015    Pneumococcal Conjugate (PCV-13) 09/23/2015    Pneumococcal Vaccine (Unspecified Type) 09/28/2009    TB Skin Test (PPD) Intradermal 06/20/2019, 11/25/2019, 01/01/2020    Tdap 05/21/2010, 12/31/2019       All Labs from Last 24 Hrs:  Recent Results (from the past 24 hour(s))   GLUCOSE, POC    Collection Time: 01/02/20 11:38 AM   Result Value Ref Range    Glucose (POC) 176 (H) 65 - 100 mg/dL   GLUCOSE, POC    Collection Time: 01/02/20  4:47 PM   Result Value Ref Range    Glucose (POC) 197 (H) 65 - 100 mg/dL   GLUCOSE, POC    Collection Time: 01/02/20  9:05 PM   Result Value Ref Range    Glucose (POC) 176 (H) 65 - 100 mg/dL   PLEASE READ & DOCUMENT PPD TEST IN 48 HRS    Collection Time: 01/03/20  2:06 AM   Result Value Ref Range    PPD Negative Negative    mm Induration 0 0 - 5 mm   METABOLIC PANEL, BASIC    Collection Time: 01/03/20 5:39 AM   Result Value Ref Range    Sodium 143 136 - 145 mmol/L    Potassium 4.0 3.5 - 5.1 mmol/L    Chloride 112 (H) 98 - 107 mmol/L    CO2 29 21 - 32 mmol/L    Anion gap 2 (L) 7 - 16 mmol/L    Glucose 96 65 - 100 mg/dL    BUN 19 8 - 23 MG/DL    Creatinine 0.95 0.6 - 1.0 MG/DL    GFR est AA >60 >60 ml/min/1.73m2    GFR est non-AA >60 >60 ml/min/1.73m2    Calcium 8.7 8.3 - 10.4 MG/DL   GLUCOSE, POC    Collection Time: 01/03/20  8:21 AM   Result Value Ref Range    Glucose (POC) 94 65 - 100 mg/dL       Discharge Exam:  Patient Vitals for the past 24 hrs:   Temp Pulse Resp BP SpO2   01/03/20 0815 98 °F (36.7 °C) 71 17 130/62 99 %   01/03/20 0339 98.1 °F (36.7 °C) 79 18 119/61 98 %   01/02/20 2334 98.1 °F (36.7 °C) 73 18 107/56 97 %   01/02/20 1919 98.4 °F (36.9 °C) 84 18 105/52 96 %   01/02/20 1539 98.1 °F (36.7 °C) 77 18 100/52 96 %   01/02/20 1255 97.7 °F (36.5 °C) 84 20 107/54 97 %   01/02/20 1200  81 25 101/58 100 %   01/02/20 1100 98.6 °F (37 °C) 78 17 104/64    01/02/20 1057     100 %   01/02/20 1000  72 24 125/58 100 %     Oxygen Therapy  O2 Sat (%): 99 % (01/03/20 0815)  Pulse via Oximetry: 82 beats per minute (01/02/20 1200)  O2 Device: Room air (01/02/20 0300)    Intake/Output Summary (Last 24 hours) at 1/3/2020 0954  Last data filed at 1/2/2020 1100  Gross per 24 hour   Intake 768.75 ml   Output    Net 768.75 ml         Physical exam:  General:    Well nourished. Alert. No distress. Eyes:   Normal sclera. Extraocular movements intact. ENT:  Normocephalic, atraumatic. Moist mucous membranes  CV:   Regular rate and rhythm. No murmur, rub, or gallop. Lungs:  Clear to auscultation bilaterally. No wheezing, rhonchi, or rales. Abdomen: Soft, nontender, nondistended. Bowel sounds normal.   Extremities: Warm and dry. No cyanosis or edema. Neurologic: No focal deficits  Skin:     No rashes or jaundice. Psych:  Normal mood and affect.     Discharge Info:   Current Discharge Medication List CONTINUE these medications which have NOT CHANGED    Details   calcium-vitamin D (OYSTER SHELL) 500 mg(1,250mg) -200 unit per tablet Take 1 Tab by mouth three (3) times daily (with meals). Qty: 270 Tab, Refills: 0      galantamine (RAZADYNE) 4 mg tablet TAKE 1 TAB BY MOUTH TWO (2) TIMES A DAY. Qty: 180 Tab, Refills: 2    Associated Diagnoses: Late onset Alzheimer's disease without behavioral disturbance (HCC)      levothyroxine (SYNTHROID) 100 mcg tablet Take 1 Tab by mouth Daily (before breakfast). Qty: 90 Tab, Refills: 3    Associated Diagnoses: Hypothyroidism, adult      metFORMIN ER (GLUCOPHAGE XR) 500 mg tablet Take 1 Tab by mouth two (2) times a day. Qty: 180 Tab, Refills: 1    Associated Diagnoses: Controlled type 2 diabetes mellitus with complication, without long-term current use of insulin (HCC)      citalopram (CELEXA) 20 mg tablet Take 1 Tab by mouth daily. Qty: 90 Tab, Refills: 3    Associated Diagnoses: Anxiety state      simvastatin (ZOCOR) 40 mg tablet Take 1 Tab by mouth nightly. Qty: 90 Tab, Refills: 3    Associated Diagnoses: Coronary artery disease involving native coronary artery of native heart without angina pectoris      omeprazole (PRILOSEC) 20 mg capsule Take 1 Cap by mouth daily. Qty: 90 Cap, Refills: 3    Associated Diagnoses: Gastroesophageal reflux disease without esophagitis      metoprolol tartrate (LOPRESSOR) 50 mg tablet Take 1 Tab by mouth two (2) times a day. Qty: 180 Tab, Refills: 3    Associated Diagnoses: Coronary artery disease involving native coronary artery of native heart without angina pectoris      buPROPion XL (WELLBUTRIN XL) 150 mg tablet Take 1 Tab by mouth every morning. Qty: 90 Tab, Refills: 1    Associated Diagnoses: Reactive depression      diclofenac (VOLTAREN) 1 % gel Apply 4 g to affected area four (4) times daily.   Qty: 250 g, Refills: 5    Associated Diagnoses: Pain of left hip joint      prednisoLONE acetate (PRED FORTE) 1 % ophthalmic suspension Administer 1 Drop to both eyes four (4) times daily. isosorbide mononitrate ER (IMDUR) 60 mg CR tablet Take  by mouth every morning. ondansetron (ZOFRAN ODT) 4 mg disintegrating tablet Take 1 Tab by mouth every eight (8) hours as needed for Nausea. Qty: 30 Tab, Refills: 0         STOP taking these medications       clopidogrel (PLAVIX) 75 mg tab Comments:   Reason for Stopping:         aspirin (ASPIRIN) 325 mg tablet Comments:   Reason for Stopping:         enoxaparin (LOVENOX) 40 mg/0.4 mL Comments:   Reason for Stopping:                 Disposition: SNF    Activity: Activity as tolerated  Diet: DIET DIABETIC CONSISTENT CARB Regular  DIET NUTRITIONAL SUPPLEMENTS All Meals; Glucerna Shake    Follow-up Appointments   Procedures    FOLLOW UP VISIT Appointment in: 1 - 8099 Svetlana Diana MD     Standing Status:   Standing     Number of Occurrences:   1     Order Specific Question:   Appointment in     Answer:   3 - 5 Days         Follow-up Information     Follow up With Specialties Details Why Contact Info    Yara Branham MD [place] [id number] [facility],[address] Floor, [address]  [phone]            Case reviewed with supervising physician - Dr. Guillermo Duran    Time spent in patient discharge planning and coordination 35 minutes.     Signed:  Martha Cogan, FNP

## 2020-01-03 NOTE — PROGRESS NOTES
LMSW follow up this am with daughters at bedside. Decision made for pt to return to [address] [phone] and facility will accept pt back. Daughters to transport pt. Packet prepared to go with pt back to [facility] with recommendation for pt to have follow up PT/OT at the facility. Care Management Interventions  PCP Verified by CM: Yes(confirmsDr. Deric Dress)  Mode of Transport at Discharge: Other (see comment)  Transition of Care Consult (CM Consult): Assisted Living  Discharge Durable Medical Equipment: (w/c at [facility])  Current Support Network: Assisted Living  Confirm Follow Up Transport: Family  The Plan for Transition of Care is Related to the Following Treatment Goals : possibly STR  The Patient and/or Patient Representative was Provided with a Choice of Provider and Agrees with the Discharge Plan?: Yes  Name of the Patient Representative Who was Provided with a Choice of Provider and Agrees with the Discharge Plan: Yoan Forte and [address] [facility] [address] of Choice List was Provided with Basic Dialogue that Supports the Patient's Individualized Plan of Care/Goals, Treatment Preferences and Shares the Quality Data Associated with the Providers?: Yes  The [employer] Information Provided?: [facility]  Discharge Location  Discharge Placement: Assisted Living(Pt returns to [address] Unit at discharge with recommendation for onsite PT/Ot. )

## 2020-01-16 ENCOUNTER — HOSPITAL ENCOUNTER (EMERGENCY)
Age: 80
Discharge: HOME | End: 2020-01-16
Attending: EMERGENCY MEDICINE
Payer: MEDICARE

## 2020-01-16 ENCOUNTER — APPOINTMENT (OUTPATIENT)
Dept: CT IMAGING | Age: 80
End: 2020-01-16
Attending: EMERGENCY MEDICINE
Payer: MEDICARE

## 2020-01-16 VITALS
RESPIRATION RATE: 18 BRPM | HEART RATE: 65 BPM | WEIGHT: 128 LBS | TEMPERATURE: 98 F | HEIGHT: 63 IN | OXYGEN SATURATION: 99 % | BODY MASS INDEX: 22.68 KG/M2 | DIASTOLIC BLOOD PRESSURE: 49 MMHG | SYSTOLIC BLOOD PRESSURE: 96 MMHG

## 2020-01-16 DIAGNOSIS — N30.00 ACUTE CYSTITIS WITHOUT HEMATURIA: Primary | ICD-10-CM

## 2020-01-16 LAB
ANION GAP SERPL CALC-SCNC: 6 MMOL/L (ref 7–16)
BACTERIA URNS QL MICRO: ABNORMAL /HPF
BUN SERPL-MCNC: 18 MG/DL (ref 8–23)
CALCIUM SERPL-MCNC: 10.2 MG/DL (ref 8.3–10.4)
CASTS URNS QL MICRO: 0 /LPF
CHLORIDE SERPL-SCNC: 107 MMOL/L (ref 98–107)
CO2 SERPL-SCNC: 28 MMOL/L (ref 21–32)
CREAT SERPL-MCNC: 1.22 MG/DL (ref 0.6–1)
CRYSTALS URNS QL MICRO: 0 /LPF
EPI CELLS #/AREA URNS HPF: ABNORMAL /HPF
ERYTHROCYTE [DISTWIDTH] IN BLOOD BY AUTOMATED COUNT: 13.1 % (ref 11.9–14.6)
GLUCOSE SERPL-MCNC: 131 MG/DL (ref 65–100)
HCT VFR BLD AUTO: 39.4 % (ref 35.8–46.3)
HGB BLD-MCNC: 12.3 G/DL (ref 11.7–15.4)
MAGNESIUM SERPL-MCNC: 1.6 MG/DL (ref 1.8–2.4)
MCH RBC QN AUTO: 28.9 PG (ref 26.1–32.9)
MCHC RBC AUTO-ENTMCNC: 31.2 G/DL (ref 31.4–35)
MCV RBC AUTO: 92.7 FL (ref 79.6–97.8)
MUCOUS THREADS URNS QL MICRO: 0 /LPF
NRBC # BLD: 0 K/UL (ref 0–0.2)
OTHER OBSERVATIONS,UCOM: ABNORMAL
PLATELET # BLD AUTO: 497 K/UL (ref 150–450)
PMV BLD AUTO: 10.2 FL (ref 9.4–12.3)
POTASSIUM SERPL-SCNC: 4.7 MMOL/L (ref 3.5–5.1)
RBC # BLD AUTO: 4.25 M/UL (ref 4.05–5.2)
RBC #/AREA URNS HPF: ABNORMAL /HPF
SODIUM SERPL-SCNC: 141 MMOL/L (ref 136–145)
WBC # BLD AUTO: 7.8 K/UL (ref 4.3–11.1)
WBC URNS QL MICRO: ABNORMAL /HPF

## 2020-01-16 PROCEDURE — 80048 BASIC METABOLIC PNL TOTAL CA: CPT

## 2020-01-16 PROCEDURE — 70450 CT HEAD/BRAIN W/O DYE: CPT

## 2020-01-16 PROCEDURE — 81015 MICROSCOPIC EXAM OF URINE: CPT

## 2020-01-16 PROCEDURE — 83735 ASSAY OF MAGNESIUM: CPT

## 2020-01-16 PROCEDURE — 87086 URINE CULTURE/COLONY COUNT: CPT

## 2020-01-16 PROCEDURE — 85027 COMPLETE CBC AUTOMATED: CPT

## 2020-01-16 PROCEDURE — 99284 EMERGENCY DEPT VISIT MOD MDM: CPT | Performed by: EMERGENCY MEDICINE

## 2020-01-16 RX ORDER — CEPHALEXIN 500 MG/1
500 CAPSULE ORAL 3 TIMES DAILY
Qty: 21 CAP | Refills: 0 | Status: SHIPPED | OUTPATIENT
Start: 2020-01-16 | End: 2020-01-23

## 2020-01-16 NOTE — ED NOTES
I have reviewed discharge instructions with the patient. The patient verbalized understanding. Patient left ED via Discharge Method: wheelchair to Home with family. Opportunity for questions and clarification provided. Patient given 1 scripts. To continue your aftercare when you leave the hospital, you may receive an automated call from our care team to check in on how you are doing. This is a free service and part of our promise to provide the best care and service to meet your aftercare needs.  If you have questions, or wish to unsubscribe from this service please call [phone]. Thank you for Choosing our [facility] Emergency Department.

## 2020-01-16 NOTE — ED PROVIDER NOTES
42-year-old lady presents with concerns about some \"thick speech\" this morning. Family says that they went to take her to a blood draw appointment and noticed that she was shuffling her feet more than normal and that some of her words seemed heavier than normal.  Family is concerned because around January 1 she had an episode where she had some subarachnoid bleeding. She has had no other symptoms including no nausea, vomiting, or diarrhea. No fevers or chills. Elements of this note were created using speech recognition software. As such, errors of speech recognition may be present. Past Medical History:  
Diagnosis Date  Alzheimer's dementia ([address]  Anxiety state, unspecified  CAD (coronary artery disease)  Diabetes ([address] 8/27/2009  GERD (gastroesophageal reflux disease)  History of snoring 7/13/2016  History of stroke 10/19/2016  Hypertension  Hypothyroid 8/27/2009  Late onset Alzheimer's disease without behavioral disturbance ([address] 07/12/2012  Mild dementia ([address] 8/30/2016  Mixed hyperlipidemia  Osteopenia  Reactive depression 5/16/2016  Retinopathy due to secondary diabetes ([address] 8/27/2009  Sensory polyneuropathy 8/30/2016 Past Surgical History:  
Procedure Laterality Date  HX ANGIOPLASTY  2009 STENTING-post CABG  
 HX CHOLECYSTECTOMY  1993 LAPAROSCOPIC  
 HX CORONARY ARTERY BYPASS GRAFT  2009  
 triple [id number] thereafter  HX HEENT    
 right eyex  2-laser surgery?  HX KNEE ARTHROSCOPY Right  HX OTHER SURGICAL  2008  
 cataract extraction and iol implant  HX OTHER SURGICAL Carotid stent 3  
 HX THYROIDECTOMY  HX TONSILLECTOMY  as child  HX TOTAL VAGINAL HYSTERECTOMY    
 hysterectomy  HX UROLOGICAL    
 bladder tack Family History:  
Problem Relation Age of Onset  Heart Disease Mother CHF  Lung Disease Mother  COPD Mother  Heart Disease Father CAD  Cancer Father LUNG  
 Heart Disease Maternal Grandfather  Diabetes Maternal Grandfather  Heart Disease Paternal Grandfather  Breast Cancer Child 48  Hypertension Sister  Other Sister TACHYCARDIA  Diabetes Maternal Grandmother  Arthritis-osteo Sister  Malignant Hyperthermia Neg Hx  Pseudocholinesterase Deficiency Neg Hx  Delayed Awakening Neg Hx  Post-op Nausea/Vomiting Neg Hx  Emergence Delirium Neg Hx  Post-op Cognitive Dysfunction Neg Hx Social History Socioeconomic History  Marital status:  Spouse name: Not on file  Number of children: Not on file  Years of education: Not on file  Highest education level: Not on file Occupational History  Not on file Social Needs  Financial resource strain: Not on file  Food insecurity:  
  Worry: Not on file Inability: Not on file  Transportation needs:  
  Medical: Not on file Non-medical: Not on file Tobacco Use  Smoking status: Never Smoker  Smokeless tobacco: Never Used Substance and Sexual Activity  Alcohol use: No  
 Drug use: No  
 Sexual activity: Not Currently Partners: Male Lifestyle  Physical activity:  
  Days per week: Not on file Minutes per session: Not on file  Stress: Not on file Relationships  Social connections:  
  Talks on phone: Not on file Gets together: Not on file Attends [employer] service: Not on file Active member of club or organization: Not on file Attends meetings of clubs or organizations: Not on file Relationship status: Not on file  Intimate partner violence:  
  Fear of current or ex partner: Not on file Emotionally abused: Not on file Physically abused: Not on file Forced sexual activity: Not on file Other Topics Concern  Not on file Social History Narrative  Not on file ALLERGIES: Ace inhibitors; Aricept [donepezil]; Dextrose; Gatifloxacin; Levaquin [levofloxacin]; Macrodantin [nitrofurantoin macrocrystalline]; Morphine; Namenda [memantine]; Carmencita; and Sulfa (sulfonamide antibiotics) Review of Systems Constitutional: Negative for chills, diaphoresis and fever. HENT: Negative for congestion, rhinorrhea and sore throat. Eyes: Negative for redness and visual disturbance. Respiratory: Negative for cough, chest tightness, shortness of breath and wheezing. Cardiovascular: Negative for chest pain and palpitations. Gastrointestinal: Negative for abdominal pain, blood in stool, diarrhea, nausea and vomiting. Endocrine: Negative for polydipsia and polyuria. Genitourinary: Negative for dysuria and hematuria. Musculoskeletal: Positive for gait problem. Negative for arthralgias, myalgias and neck stiffness. Skin: Negative for rash. Allergic/Immunologic: Negative for environmental allergies and food allergies. Neurological: Positive for speech difficulty. Negative for dizziness, weakness and headaches. Hematological: Negative for adenopathy. Does not bruise/bleed easily. Psychiatric/Behavioral: Negative for confusion and sleep disturbance. The patient is not nervous/anxious. Vitals:  
 01/16/20 1110 BP: 97/60 Pulse: 65 Resp: 18 Temp: 98 °F (36.7 °C) SpO2: 99% Weight: 58.1 kg (128 lb) Height: 5' 3\" (1.6 m) Physical Exam 
Vitals signs and nursing note reviewed. Constitutional:   
   General: She is not in acute distress. Appearance: She is well-developed. She is not toxic-appearing. HENT:  
   Head: Normocephalic and atraumatic. Eyes:  
   General: No scleral icterus. Right eye: No discharge. Left eye: No discharge. Conjunctiva/sclera: Conjunctivae normal.  
   Pupils: Pupils are equal, round, and reactive to light. Neck: Musculoskeletal: Normal range of motion. No neck rigidity. Cardiovascular:  
   Rate and Rhythm: Normal rate and regular rhythm. Heart sounds: Normal heart sounds. Pulmonary:  
   Effort: Pulmonary effort is normal. No respiratory distress. Breath sounds: Normal breath sounds. No wheezing or rales. Chest:  
   Chest wall: No tenderness. Abdominal:  
   General: Bowel sounds are normal. There is no distension. Palpations: Abdomen is soft. Tenderness: There is no guarding or rebound. Musculoskeletal: Normal range of motion. General: No tenderness. Lymphadenopathy:  
   Cervical: No cervical adenopathy. Skin: 
   General: Skin is warm and dry. Neurological:  
   General: No focal deficit present. Mental Status: She is alert and oriented to person, place, and time. Psychiatric:     
   Mood and Affect: Mood normal.     
   Behavior: Behavior normal.  
 
  
 
MDM Number of Diagnoses or Management Options Diagnosis management comments: Patient symptoms have improved with the family says although they are not entirely back to normal. 
 
I will check her blood work and a urine. Given her recent intracranial hemorrhage she is not a TPA candidate so no code stroke was called. Procedures

## 2020-01-16 NOTE — ED TRIAGE NOTES
Patient presents from MD office. States hx of subarachnoid 2 weeks ago d/t fall. Family states new onset slurred speech and left foot weakness. Daughter noticed changes this morning at 0930 when she picked her up from memory care. No longer on blood thinners d/t high risk of falls. Hx of dementia.

## 2020-01-16 NOTE — DISCHARGE INSTRUCTIONS
Return with any fevers, vomiting, worsening confusion, additional symptoms, or other concerns. Follow-up with your primary care doctor for reevaluation as needed.

## 2020-01-16 NOTE — PROGRESS NOTES
Patient with recent admit and discharge from [facility] (1-10-20 to 1-13-20) with return to [facility]). PCP - Dr. Megan Nash.

## 2020-01-18 LAB
BACTERIA SPEC CULT: NORMAL
BACTERIA SPEC CULT: NORMAL
SERVICE CMNT-IMP: NORMAL

## 2022-03-28 NOTE — CONSULTS
PM&R Rehab Consult Subjective:  
 
Date of Consultation:  November 26, 2019 Referring Physician: Dr Subhash Bauer Patient is a 78 y.o. female who is being seen for rehab recommendations s/p right hip hemiarthroplasy secondary to right femoral neck fx Principal Problem: 
  Closed fracture of neck of right femur ([address] (11/25/2019) Active Problems: 
  CAD (coronary artery disease) (8/27/2009) Essential hypertension (8/27/2009) Retinopathy due to secondary diabetes ([address] (8/27/2009) Hypothyroidism, adult (9/23/2015) History of stroke (10/19/2016) Late onset Alzheimer's disease without behavioral disturbance ([address] (7/12/2017) Type 2 diabetes with nephropathy ([address] (3/13/2018) Hip fracture requiring operative repair ([address] (11/25/2019) HPI: Ms Napoleon Cardona is a confused 77yo female who resides in an [facility] who fell on 11/24 sustaining a right femoral neck fx. She normally ambulates with a RW. She had gotten up in the middle of the night to adjust the thermostat when she fell. She was unable to bear wt but able to crawl to her door where staff at the nursing station could assist. She has dementia and is a poor historian and no family at bedside. She has a PMH of CAD/CABGx 3, stroke, diabetes, alzheimers dementia, diabetic neuropathy, hx of falls,osteopenia and retinopathy. She underwent a right hip hemiarthroplasty on 11/25 by Dr Mary Carmona. She is WBAT with hip precautions. Functionally, she is min/mod assist of 2 for STS and amb 5ft with min /mod assist of 2 with RW. Past Medical History:  
Diagnosis Date  Alzheimer's dementia ([address]  Anxiety state, unspecified  CAD (coronary artery disease)  Diabetes ([address] 8/27/2009  GERD (gastroesophageal reflux disease)  History of snoring 7/13/2016  History of stroke 10/19/2016  Hypertension  Hypothyroid 8/27/2009  Late onset Alzheimer's disease without behavioral disturbance ([address] 07/12/2012 Patient called stating that [employer] is $37.00 and he can get it for $3.00 at [facility] in [place]. Req we send it to [address] [place] [address].   Phone  [phone]  Mild dementia ([facility] 8/30/2016  Mixed hyperlipidemia  Osteopenia  Reactive depression 5/16/2016  Retinopathy due to secondary diabetes ([facility] 8/27/2009  Sensory polyneuropathy 8/30/2016 Family History Problem Relation Age of Onset  Heart Disease Mother CHF  Lung Disease Mother  COPD Mother  Heart Disease Father CAD  Cancer Father LUNG  
 Heart Disease Maternal Grandfather  Diabetes Maternal Grandfather  Heart Disease Paternal Grandfather  Breast Cancer Child 48  Hypertension Sister  Other Sister TACHYCARDIA  Diabetes Maternal Grandmother  Arthritis-osteo Sister  Malignant Hyperthermia Neg Hx  Pseudocholinesterase Deficiency Neg Hx  Delayed Awakening Neg Hx  Post-op Nausea/Vomiting Neg Hx  Emergence Delirium Neg Hx  Post-op Cognitive Dysfunction Neg Hx Social History Tobacco Use  Smoking status: Never Smoker  Smokeless tobacco: Never Used Substance Use Topics  Alcohol use: No  
 
Past Surgical History:  
Procedure Laterality Date  HX ANGIOPLASTY  2009 STENTING-post CABG  
 HX CHOLECYSTECTOMY  1993 LAPAROSCOPIC  
 HX CORONARY ARTERY BYPASS GRAFT  2009  
 triple [id number] thereafter  HX HEENT    
 right eyex  2-laser surgery?  HX KNEE ARTHROSCOPY Right  HX OTHER SURGICAL  2008  
 cataract extraction and iol implant  HX OTHER SURGICAL Carotid stent 3  
 HX THYROIDECTOMY  HX TONSILLECTOMY  as child  HX TOTAL VAGINAL HYSTERECTOMY    
 hysterectomy  HX UROLOGICAL    
 bladder tack Prior to Admission medications Medication Sig Start Date End Date Taking? Authorizing Provider  
galantamine (RAZADYNE) 4 mg tablet TAKE 1 TAB BY MOUTH TWO (2) TIMES A DAY. 6/21/19   Ale Harris MD  
levothyroxine (SYNTHROID) 100 mcg tablet Take 1 Tab by mouth Daily (before breakfast).  12/3/18   Arabella Power MD  
 metFORMIN ER (GLUCOPHAGE XR) 500 mg tablet Take 1 Tab by mouth two (2) times a day. 12/3/18   Mitesh Hoffmann MD  
citalopram (CELEXA) 20 mg tablet Take 1 Tab by mouth daily. 12/3/18   Mitesh Hoffmann MD  
simvastatin (ZOCOR) 40 mg tablet Take 1 Tab by mouth nightly. 12/3/18   Mitesh Hoffmann MD  
omeprazole (PRILOSEC) 20 mg capsule Take 1 Cap by mouth daily. 12/3/18   Mitesh Hoffmann MD  
metoprolol tartrate (LOPRESSOR) 50 mg tablet Take 1 Tab by mouth two (2) times a day. 12/3/18   Mitesh Hoffmann MD  
clopidogrel (PLAVIX) 75 mg tab Take 1 Tab by mouth daily. 12/3/18   Mitesh Hoffmann MD  
buPROPion XL (WELLBUTRIN XL) 150 mg tablet Take 1 Tab by mouth every morning. 9/26/18   Mitesh Hoffmann MD  
ondansetron (ZOFRAN ODT) 4 mg disintegrating tablet Take 1 Tab by mouth every eight (8) hours as needed for Nausea. 2/5/18   Mitesh Hoffmann MD  
diclofenac (VOLTAREN) 1 % gel Apply 4 g to affected area four (4) times daily. 9/25/17   Mitesh Hoffmann MD  
prednisoLONE acetate (PRED FORTE) 1 % ophthalmic suspension Administer 1 Drop to both eyes four (4) times daily. Provider, Historical  
isosorbide mononitrate ER (IMDUR) 60 mg CR tablet Take  by mouth every morning. Provider, Historical  
aspirin (ASPIRIN) 325 mg tablet Take 1 Tab by mouth daily. 12/1/09   Ayad Boss MD  
 
Allergies Allergen Reactions  Ace Inhibitors Cough  Aricept [Donepezil] Diarrhea  Dextrose Unknown (comments)  Gatifloxacin Other (comments) HYPERTENSION  
 Levaquin [Levofloxacin] Hives and Other (comments) Causes blood sugar to \"bottom out\"  Macrodantin [Nitrofurantoin Macrocrystalline] Diarrhea  Morphine Other (comments) Urinary retention  Namenda [Memantine] Nausea Only  Carmencita Hives All Carmencita class of drugs  Sulfa (Sulfonamide Antibiotics) Unknown (comments) Review of Systems:  Review of systems not obtained due to patient factors. Objective:  
 
Vitals: Blood pressure 107/52, pulse 85, temperature 98.2 °F (36.8 °C), resp. rate 16, height 5' 3\" (1.6 m), weight 128 lb (58.1 kg), SpO2 95 %, not currently breastfeeding. Temp (24hrs), Av.4 °F (36.9 °C), Min:97.3 °F (36.3 °C), Max:99.1 °F (37.3 °C) Intake and Output: 
 1901 -  0700 In: 700 [I.V.:700] Out: 1075 [Urine:1000] Physical Exam: 
General:  Alert, oriented to self only and mood affect appropriate Lungs:   Clear to auscultation bilaterally. Heart:  Regular rate and rhythm, S1, S2 stable, no murmur, click, rub or gallop. Abdomen:   Soft, non-tender. Bowel sounds present. No masses,  No organomegaly. Genitourinary: defer Neuro Muscular: Unable to lift either LE off bed. Difficulty following commands. Couldn't tell me why she was here Skin:  No rashes, lesions, or signs/symptoms or infection. Pulses 2+ Has some bruising dorsum of right foot Labs/Studies: 
Recent Results (from the past 72 hour(s)) EKG, 12 LEAD, INITIAL Collection Time: 19  4:23 AM  
Result Value Ref Range Ventricular Rate 76 BPM  
 Atrial Rate 76 BPM  
 P-R Interval 160 ms QRS Duration 82 ms Q-T Interval 386 ms QTC Calculation (Bezet) 434 ms Calculated P Axis 76 degrees Calculated R Axis -32 degrees Calculated T Axis 57 degrees Diagnosis    
  !! AGE AND GENDER SPECIFIC ECG ANALYSIS !! Normal sinus rhythm Left axis deviation Nonspecific ST depression When compared with ECG of 01-OCT-2019 16:35, No significant change was found Confirmed by Ann Coleman MD (), AURELIA CHRISTIE ([id number]) on 2019 7:31:44 AM 
  
CBC W/O DIFF Collection Time: 19  4:26 AM  
Result Value Ref Range WBC 10.1 4.3 - 11.1 K/uL  
 RBC 4.32 4.05 - 5.2 M/uL  
 HGB 13.1 11.7 - 15.4 g/dL HCT 40.0 35.8 - 46.3 % MCV 92.6 79.6 - 97.8 FL  
 MCH 30.3 26.1 - 32.9 PG  
 MCHC 32.8 31.4 - 35.0 g/dL  
 RDW 12.4 11.9 - 14.6 % PLATELET 387 469 - 666 K/uL  MPV 11.0 9.4 - 12.3 FL  
 ABSOLUTE NRBC 0.00 0.0 - 0.2 K/uL PROTHROMBIN TIME + INR Collection Time: 11/25/19  4:26 AM  
Result Value Ref Range Prothrombin time 13.0 11.7 - 14.5 sec INR 1.0    
URINALYSIS W/ RFLX MICROSCOPIC Collection Time: 11/25/19  4:57 AM  
Result Value Ref Range Color YELLOW Appearance CLEAR Specific gravity 1.010 1.001 - 1.023    
 pH (UA) 6.5 5.0 - 9.0 Protein NEGATIVE  NEG mg/dL Glucose NEGATIVE  mg/dL Ketone NEGATIVE  NEG mg/dL Bilirubin NEGATIVE  NEG Blood MODERATE (A) NEG Urobilinogen 0.2 0.2 - 1.0 EU/dL Nitrites NEGATIVE  NEG Leukocyte Esterase NEGATIVE  NEG    
 WBC 0-3 0 /hpf  
 RBC 5-10 0 /hpf Epithelial cells 0-3 0 /hpf Bacteria 0 0 /hpf Casts 0-3 0 /lpf MSSA/MRSA SC BY PCR, NASAL SWAB Collection Time: 11/25/19  6:24 AM  
Result Value Ref Range Special Requests: NO SPECIAL REQUESTS Culture result:     
  SA target not detected. A MRSA NEGATIVE, SA NEGATIVE test result does not preclude MRSA or SA nasal colonization. METABOLIC PANEL, COMPREHENSIVE Collection Time: 11/25/19  7:17 AM  
Result Value Ref Range Sodium 141 136 - 145 mmol/L Potassium 4.3 3.5 - 5.1 mmol/L Chloride 108 (H) 98 - 107 mmol/L  
 CO2 26 21 - 32 mmol/L Anion gap 7 7 - 16 mmol/L Glucose 190 (H) 65 - 100 mg/dL BUN 23 8 - 23 MG/DL Creatinine 1.14 (H) 0.6 - 1.0 MG/DL  
 GFR est AA 59 (L) >60 ml/min/1.73m2 GFR est non-AA 49 (L) >60 ml/min/1.73m2 Calcium 9.3 8.3 - 10.4 MG/DL Bilirubin, total 0.6 0.2 - 1.1 MG/DL  
 ALT (SGPT) 21 12 - 65 U/L  
 AST (SGOT) 11 (L) 15 - 37 U/L Alk. phosphatase 84 50 - 136 U/L Protein, total 6.4 6.3 - 8.2 g/dL Albumin 3.5 3.2 - 4.6 g/dL Globulin 2.9 2.3 - 3.5 g/dL A-G Ratio 1.2 1.2 - 3.5 GLUCOSE, POC Collection Time: 11/25/19  7:23 AM  
Result Value Ref Range Glucose (POC) 223 (H) 65 - 100 mg/dL GLUCOSE, POC  Collection Time: 11/25/19 11:03 AM  
 Result Value Ref Range Glucose (POC) 207 (H) 65 - 100 mg/dL TYPE & SCREEN Collection Time: 11/25/19  2:59 PM  
Result Value Ref Range Crossmatch Expiration 11/28/2019 ABO/Rh(D) O POSITIVE Antibody screen NEG   
GLUCOSE, POC Collection Time: 11/25/19  3:04 PM  
Result Value Ref Range Glucose (POC) 170 (H) 65 - 100 mg/dL GLUCOSE, POC Collection Time: 11/25/19  9:12 PM  
Result Value Ref Range Glucose (POC) 156 (H) 65 - 100 mg/dL CBC WITH AUTOMATED DIFF Collection Time: 11/25/19 10:25 PM  
Result Value Ref Range WBC 12.5 (H) 4.3 - 11.1 K/uL  
 RBC 3.65 (L) 4.05 - 5.2 M/uL  
 HGB 10.9 (L) 11.7 - 15.4 g/dL HCT 34.8 (L) 35.8 - 46.3 % MCV 95.3 79.6 - 97.8 FL  
 MCH 29.9 26.1 - 32.9 PG  
 MCHC 31.3 (L) 31.4 - 35.0 g/dL  
 RDW 12.4 11.9 - 14.6 % PLATELET 600 361 - 123 K/uL MPV 10.9 9.4 - 12.3 FL ABSOLUTE NRBC 0.00 0.0 - 0.2 K/uL  
 DF AUTOMATED NEUTROPHILS 79 (H) 43 - 78 % LYMPHOCYTES 9 (L) 13 - 44 % MONOCYTES 11 4.0 - 12.0 % EOSINOPHILS 1 0.5 - 7.8 % BASOPHILS 0 0.0 - 2.0 % IMMATURE GRANULOCYTES 0 0.0 - 5.0 %  
 ABS. NEUTROPHILS 9.9 (H) 1.7 - 8.2 K/UL  
 ABS. LYMPHOCYTES 1.1 0.5 - 4.6 K/UL  
 ABS. MONOCYTES 1.4 (H) 0.1 - 1.3 K/UL  
 ABS. EOSINOPHILS 0.1 0.0 - 0.8 K/UL  
 ABS. BASOPHILS 0.0 0.0 - 0.2 K/UL  
 ABS. IMM. GRANS. 0.0 0.0 - 0.5 K/UL  
PLEASE READ & DOCUMENT PPD TEST IN 24 HRS Collection Time: 11/26/19  6:09 AM  
Result Value Ref Range PPD Negative Negative  
 mm Induration 0 0 - 5 mm METABOLIC PANEL, BASIC Collection Time: 11/26/19  6:10 AM  
Result Value Ref Range Sodium 138 136 - 145 mmol/L Potassium 3.8 3.5 - 5.1 mmol/L Chloride 105 98 - 107 mmol/L  
 CO2 27 21 - 32 mmol/L Anion gap 6 (L) 7 - 16 mmol/L Glucose 152 (H) 65 - 100 mg/dL BUN 21 8 - 23 MG/DL Creatinine 1.11 (H) 0.6 - 1.0 MG/DL  
 GFR est AA >60 >60 ml/min/1.73m2 GFR est non-AA 50 (L) >60 ml/min/1.73m2  Calcium 8.7 8.3 - 10.4 MG/DL  
 CBC WITH AUTOMATED DIFF Collection Time: 11/26/19  6:10 AM  
Result Value Ref Range WBC 11.1 4.3 - 11.1 K/uL  
 RBC 3.51 (L) 4.05 - 5.2 M/uL  
 HGB 10.6 (L) 11.7 - 15.4 g/dL HCT 33.7 (L) 35.8 - 46.3 % MCV 96.0 79.6 - 97.8 FL  
 MCH 30.2 26.1 - 32.9 PG  
 MCHC 31.5 31.4 - 35.0 g/dL  
 RDW 12.3 11.9 - 14.6 % PLATELET 948 424 - 352 K/uL MPV 11.0 9.4 - 12.3 FL ABSOLUTE NRBC 0.00 0.0 - 0.2 K/uL  
 DF AUTOMATED NEUTROPHILS 77 43 - 78 % LYMPHOCYTES 6 (L) 13 - 44 % MONOCYTES 15 (H) 4.0 - 12.0 % EOSINOPHILS 2 0.5 - 7.8 % BASOPHILS 0 0.0 - 2.0 % IMMATURE GRANULOCYTES 0 0.0 - 5.0 %  
 ABS. NEUTROPHILS 8.5 (H) 1.7 - 8.2 K/UL  
 ABS. LYMPHOCYTES 0.7 0.5 - 4.6 K/UL  
 ABS. MONOCYTES 1.7 (H) 0.1 - 1.3 K/UL  
 ABS. EOSINOPHILS 0.2 0.0 - 0.8 K/UL  
 ABS. BASOPHILS 0.0 0.0 - 0.2 K/UL  
 ABS. IMM. GRANS. 0.0 0.0 - 0.5 K/UL GLUCOSE, POC Collection Time: 11/26/19  7:02 AM  
Result Value Ref Range Glucose (POC) 158 (H) 65 - 100 mg/dL GLUCOSE, POC Collection Time: 11/26/19 10:42 AM  
Result Value Ref Range Glucose (POC) 168 (H) 65 - 100 mg/dL Functional Assessment: 
Functional Assessment Fall in Past 12 Months: Yes Fall With Injury: Yes (Describe) Number of Falls Within 12 Months: (several) Approximate Date of Fall: 11/25/19 Decline in Gait/Transfer/Balance: Yes (comment) Decline in Capacity to Feed/Dress/Bathe: Yes (comment) Developmental Delay: No 
Chewing/Swallowing Problems: No 
Difficulty with Secretions: No 
Speech Slurred/Thick/Garbled: No  
 
 
Ambulation: Activity and Safety Activity Level: Chair Activity: In recliner, Family/Visitors present Activity Assistance: Partial (two people) Weight Bearing Status: WBAT (Weight Bearing as Tolerated) NWB (Non Weight Bearing extremities: RLE (Right Lower Extremity) Mode of Transportation: Stretcher Repositioned: Head of bed elevated (degrees) Patient Turned: Supine, Turns self Head of Bed Elevated: HOB 30 
 Activity Response: Tolerated well Assistive Device: Fall prevention device Safety Measures: Bed/Chair alarm on, Bed/Chair-Wheels locked, Bed in low position, Caregiver at bedside, Call light within reach Impression/Plan:  
 
Principal Problem: 
  Closed fracture of neck of right femur ([facility] (11/25/2019) Active Problems: 
  CAD (coronary artery disease) (8/27/2009) Essential hypertension (8/27/2009) Retinopathy due to secondary diabetes ([facility] (8/27/2009) Hypothyroidism, adult (9/23/2015) History of stroke (10/19/2016) Late onset Alzheimer's disease without behavioral disturbance ([facility] (7/12/2017) Type 2 diabetes with nephropathy ([facility] (3/13/2018) Hip fracture requiring operative repair [facility]) (11/25/2019) 
 
s/p Right femoral neck fx s/p R hip hemiarthroplasty Recommendations: Continue Acute Rehab Program 
Coordination of rehab/medical care Counseling of PM & R care issues management Monitoring and management of medical conditions per plan of care/orders -PT/OT post op. Will have difficulty following hip precautions due to dementia. Hi risk for falls and further complications given advanced age and dementia. She also has diabetic polyneuropathy for which she used a RW pre admission. -will require rehab in a Skilled nursing facility . CM involved. Ppd placed. Will require precert by Insurance. 
-avoid narcotics for pain due to confusion and side effects. 
-cont DVT prevention; on Lovenox/ SCDs Discussion with Staff Reviewed Therapies/Labs/Meds/Records Karyna Gray MD

## 2024-03-19 NOTE — PROGRESS NOTES
In patients chart will be primary rn when transferred to [facility] Pt aware

## (undated) DEVICE — SUTURE ETHBND EXCEL SZ 5 L30IN NONABSORBABLE GRN L40MM V-37 MB66G

## (undated) DEVICE — SOL IRR SOD CL 0.9% 3000ML --

## (undated) DEVICE — HANDPIECE SET WITH COAXIAL HIGH FLOW TIP AND SUCTION TUBE: Brand: INTERPULSE

## (undated) DEVICE — DERMABOND SKIN ADH 0.7ML -- DERMABOND ADVANCED 12/BX

## (undated) DEVICE — (D)PREP SKN CHLRAPRP APPL 26ML -- CONVERT TO ITEM 371833

## (undated) DEVICE — IMMOBILIZER KNEE 3 PNL 19 IN

## (undated) DEVICE — 3000CC GUARDIAN II: Brand: GUARDIAN

## (undated) DEVICE — INTENDED FOR TISSUE SEPARATION, AND OTHER PROCEDURES THAT REQUIRE A SHARP SURGICAL BLADE TO PUNCTURE OR CUT.: Brand: BARD-PARKER SAFETY BLADES SIZE 15, STERILE

## (undated) DEVICE — REM POLYHESIVE ADULT PATIENT RETURN ELECTRODE: Brand: VALLEYLAB

## (undated) DEVICE — 1010 S-DRAPE TOWEL DRAPE 10/BX: Brand: STERI-DRAPE™

## (undated) DEVICE — HIP HEMIARTHROPLASTY: Brand: MEDLINE INDUSTRIES, INC.

## (undated) DEVICE — DRAPE,U/SHT,SPLIT,FILM,60X84,STERILE: Brand: MEDLINE

## (undated) DEVICE — SUTURE MCRYL SZ 2-0 L27IN ABSRB VLT CT-1 L36MM 1/2 CIR TAPR Y339H

## (undated) DEVICE — 2108 SERIES SAGITTAL BLADE, NO OFFSET  (24.8 X 1.27 X 82.1MM)

## (undated) DEVICE — Z DISCONTINUED PER MEDLINE USE 2741944 DRESSING AQUACEL 12 IN SURG W9XL30CM SIL CVR WTRPRF VIR BACT BARR ANTIMIC

## (undated) DEVICE — SUTURE PDS II SZ 1 L96IN ABSRB VLT TP-1 L65MM 1/2 CIR Z880G